# Patient Record
Sex: FEMALE | Race: WHITE | NOT HISPANIC OR LATINO | Employment: OTHER | URBAN - METROPOLITAN AREA
[De-identification: names, ages, dates, MRNs, and addresses within clinical notes are randomized per-mention and may not be internally consistent; named-entity substitution may affect disease eponyms.]

---

## 2017-01-03 ENCOUNTER — ANTICOAGULATION MONITORING (OUTPATIENT)
Dept: VASCULAR LAB | Facility: MEDICAL CENTER | Age: 57
End: 2017-01-03

## 2017-01-03 DIAGNOSIS — D68.51 FACTOR V LEIDEN (HCC): ICD-10-CM

## 2017-01-03 LAB — INR PPP: 3.4 (ref 2–3.5)

## 2017-01-03 NOTE — PROGRESS NOTES
Anticoagulation Summary as of 1/3/2017     INR goal 2.5-3.5   Selected INR 3.4 (1/3/2017)   Maintenance plan 4.5 mg (3 mg x 1.5) on Mon, Wed, Fri; 3 mg (3 mg x 1) all other days   Weekly total 25.5 mg   Plan last modified Deacon Novak, KRZYSZTOF (8/15/2016)   Next INR check 1/6/2017   Target end date Indefinite    Indications   Factor V Leiden (HCC) [D68.51]  Deep vein thrombosis [453.40] (Resolved) [I82.409]  Pulmonary embolism [415.19] (Resolved) [I26.99]         Anticoagulation Episode Summary     [facility] location [facility]    Preferred lab     Send INR reminders to     Comments PATIENT SELF MONITORING      Anticoagulation Care Providers     Provider Role Specialty Phone number    HERMINIA Diehl. Referring Family Medicine [phone]    Eliseo Eduardo M.D. Responsible Cardiology [phone]    Deacon Novak, PHARMD Responsible          PSM patient however Phyllis started Augmentin and prednisone on 12/28. She does not remember exactly when she started taking these. Continue current dose of warfarin for now. Follow up INR on Friday.    Deacon Novak, FLYD

## 2017-01-11 ENCOUNTER — ANTICOAGULATION MONITORING (OUTPATIENT)
Dept: VASCULAR LAB | Facility: MEDICAL CENTER | Age: 57
End: 2017-01-11

## 2017-01-11 DIAGNOSIS — D68.51 FACTOR V LEIDEN (HCC): ICD-10-CM

## 2017-01-11 LAB — INR PPP: 3.6 (ref 2–3.5)

## 2017-01-11 NOTE — PROGRESS NOTES
Anticoagulation Summary as of 1/11/2017     INR goal 2.5-3.5   Selected INR 3.6! (1/11/2017)   Maintenance plan 4.5 mg (3 mg x 1.5) on Mon, Wed, Fri; 3 mg (3 mg x 1) all other days   Weekly total 25.5 mg   Plan last modified Deacon Novak PHARMD (8/15/2016)   Next INR check 1/18/2017   Target end date Indefinite    Indications   Factor V Leiden (HCC) [D68.51]  Deep vein thrombosis [453.40] (Resolved) [I82.409]  Pulmonary embolism [415.19] (Resolved) [I26.99]         Anticoagulation Episode Summary     [facility]    Preferred lab     Send INR reminders to     Comments PATIENT SELF MONITORING      Anticoagulation Care Providers     Provider Role Specialty Phone number    HERMINIA Diehl. Referring Family Medicine [phone]    Eliseo Eduardo M.D. Responsible Cardiology [phone]    Deacon Novak, KRZYSZTOF Responsible          Anticoagulation Patient Findings    This patient is enrolled in the Patient Self Management Program    Deacon Novak, FLYD

## 2017-01-17 ENCOUNTER — ANTICOAGULATION MONITORING (OUTPATIENT)
Dept: VASCULAR LAB | Facility: MEDICAL CENTER | Age: 57
End: 2017-01-17

## 2017-01-17 DIAGNOSIS — D68.51 FACTOR V LEIDEN (HCC): ICD-10-CM

## 2017-01-17 LAB — INR PPP: 2.4 (ref 2–3.5)

## 2017-01-17 NOTE — PROGRESS NOTES
Anticoagulation Summary as of 1/17/2017     INR goal 2.5-3.5   Selected INR 2.4! (1/17/2017)   Maintenance plan 4.5 mg (3 mg x 1.5) on Mon, Wed, Fri; 3 mg (3 mg x 1) all other days   Weekly total 25.5 mg   Plan last modified Deacon Novak PHARMD (8/15/2016)   Next INR check 1/24/2017   Target end date Indefinite    Indications   Factor V Leiden (CMS-[facility]) [D68.51]  Deep vein thrombosis [453.40] (Resolved) [I82.409]  Pulmonary embolism [415.19] (Resolved) [I26.99]         Anticoagulation Episode Summary     [facility]    Preferred lab     Send INR reminders to     Comments PATIENT SELF MONITORING      Anticoagulation Care Providers     Provider Role Specialty Phone number    DERICK Diehl Referring Family Medicine [phone]    Eliseo Eduardo M.D. Responsible Cardiology [phone]    Deacon Novak, PHARMD Responsible          This patient is enrolled in the Patient Self Management Program    Deacon Novak, PHARMD

## 2017-01-17 NOTE — PROGRESS NOTES
Received message pt will be having a dental procedure 2-7-17 which she will need to stop her warfarin for and she is having an epidural 2- which she will need to stop her warfarin for. Pt will need bridging per previous note.     Carmencita Gandhi, Pharm D

## 2017-01-19 ENCOUNTER — ANTICOAGULATION MONITORING (OUTPATIENT)
Dept: VASCULAR LAB | Facility: MEDICAL CENTER | Age: 57
End: 2017-01-19

## 2017-01-19 DIAGNOSIS — D68.51 FACTOR V LEIDEN (HCC): ICD-10-CM

## 2017-01-19 NOTE — PROGRESS NOTES
Anticoagulation Summary as of 1/19/2017     INR goal 2.5-3.5   Selected INR    Maintenance plan 4.5 mg (3 mg x 1.5) on Mon, Wed, Fri; 3 mg (3 mg x 1) all other days   Weekly total 25.5 mg   Plan last modified Deacon Novak, PHARMD (8/15/2016)   Next INR check    Target end date Indefinite    Indications   Factor V Leiden (CMS-HCC) [D68.51]  Deep vein thrombosis [453.40] (Resolved) [I82.409]  Pulmonary embolism [415.19] (Resolved) [I26.99]         Anticoagulation Episode Summary     [facility]    Preferred lab     Send INR reminders to     Comments PATIENT SELF MONITORING      Anticoagulation Care Providers     Provider Role Specialty Phone number    HERMINIA Diehl. Referring Family Medicine [phone]    Eliseo Eduardo M.D. Responsible Cardiology [phone]    Deacon Novak, PHARMD Responsible          Anticoagulation Patient Findings      Phyllis has two procedures, a dental surgery and a epidural.    She will stop warfarin for 5 days prior to dental procedure and 7 days prior to epidural.  She will use Lovenox 100mg once daily before and after both procedures (not using any the day before or the day of procedure). The date of the epidural is not schedules yet.       This note will be faxed to the [facility] [phone], attention Dr. Demetrice Bailey, PHARMD

## 2017-01-20 DIAGNOSIS — Z95.828 S/P INSERTION OF IVC (INFERIOR VENA CAVAL) FILTER: ICD-10-CM

## 2017-01-20 DIAGNOSIS — I25.2 OLD MI (MYOCARDIAL INFARCTION): ICD-10-CM

## 2017-01-20 DIAGNOSIS — D68.51 FACTOR V LEIDEN (HCC): ICD-10-CM

## 2017-02-16 ENCOUNTER — ANTICOAGULATION MONITORING (OUTPATIENT)
Dept: VASCULAR LAB | Facility: MEDICAL CENTER | Age: 57
End: 2017-02-16

## 2017-02-16 DIAGNOSIS — D68.51 FACTOR V LEIDEN (HCC): ICD-10-CM

## 2017-02-16 LAB — INR PPP: 1.7 (ref 2–3.5)

## 2017-02-17 NOTE — PROGRESS NOTES
Anticoagulation Summary as of 2/16/2017     INR goal 2.5-3.5   Selected INR 1.7! (2/16/2017)   Maintenance plan 4.5 mg (3 mg x 1.5) on Mon, Wed, Fri; 3 mg (3 mg x 1) all other days   Weekly total 25.5 mg   Plan last modified Deacon Novak, PHARMD (8/15/2016)   Next INR check 2/20/2017   Target end date Indefinite    Indications   Factor V Leiden (CMS-[facility]) [D68.51]  Deep vein thrombosis [453.40] (Resolved) [I82.409]  Pulmonary embolism [415.19] (Resolved) [I26.99]         Anticoagulation Episode Summary     [facility]    Preferred lab     Send INR reminders to     Comments PATIENT SELF MONITORING      Anticoagulation Care Providers     Provider Role Specialty Phone number    HERMINIA Diehl. Referring Family Medicine [phone]    Eliseo Eduardo M.D. Responsible Cardiology [phone]    Deacon Novak, PHARMD Responsible          Anticoagulation Patient Findings    Spoke to patient on phone. Has been back on warfarin for > a week. Last dose of enoxaparin was a couple of days ago. Restart enoxaparin 100 mg daily. Warfarin 4.5 mg tonight, 6 mg tomorrow, then resume previously prescribed regimen listed above. Follow up INR on Monday.    Deacon Novak, PHARMD

## 2017-02-20 LAB — INR PPP: 2.8 (ref 2–3.5)

## 2017-02-22 ENCOUNTER — ANTICOAGULATION MONITORING (OUTPATIENT)
Dept: VASCULAR LAB | Facility: MEDICAL CENTER | Age: 57
End: 2017-02-22

## 2017-02-22 DIAGNOSIS — D68.51 FACTOR V LEIDEN (HCC): ICD-10-CM

## 2017-02-22 NOTE — PROGRESS NOTES
Anticoagulation Summary as of 2/22/2017     INR goal 2.5-3.5   Selected INR 2.8 (2/20/2017)   Maintenance plan 4.5 mg (3 mg x 1.5) on Mon, Wed, Fri; 3 mg (3 mg x 1) all other days   Weekly total 25.5 mg   No change documented Deacon Novak PHARMD   Plan last modified Deacon Novak PHARMD (8/15/2016)   Next INR check 2/27/2017   Target end date Indefinite    Indications   Factor V Leiden (CMS-[facility]) [D68.51]  Deep vein thrombosis [453.40] (Resolved) [I82.409]  Pulmonary embolism [415.19] (Resolved) [I26.99]         Anticoagulation Episode Summary     [facility]    Preferred lab     Send INR reminders to     Comments PATIENT SELF MONITORING      Anticoagulation Care Providers     Provider Role Specialty Phone number    DERICK Diehl Referring Family Medicine [phone]    Eliseo Eduardo M.D. Responsible Cardiology [phone]    Deacon Novak PHARMD Responsible          Anticoagulation Patient Findings    Spoke to patient on phone. She discontinued enoxaparin as a result of therapeutic INR. Continue current dose of warfarin. Follow up INR on Monday.    Deacon Novak PHARMD

## 2017-03-01 ENCOUNTER — ANTICOAGULATION MONITORING (OUTPATIENT)
Dept: VASCULAR LAB | Facility: MEDICAL CENTER | Age: 57
End: 2017-03-01

## 2017-03-01 DIAGNOSIS — D68.51 FACTOR V LEIDEN (HCC): ICD-10-CM

## 2017-03-01 LAB — INR PPP: 3.4 (ref 2–3.5)

## 2017-03-01 NOTE — PROGRESS NOTES
Anticoagulation Summary as of 3/1/2017     INR goal 2.5-3.5   Selected INR 3.4 (3/1/2017)   Maintenance plan 4.5 mg (3 mg x 1.5) on Mon, Wed, Fri; 3 mg (3 mg x 1) all other days   Weekly total 25.5 mg   No change documented Deacon Novak PHARMD   Plan last modified Deacon Novak PHARMD (8/15/2016)   Next INR check 3/15/2017   Target end date Indefinite    Indications   Factor V Leiden (CMS-HCC) [D68.51]  Deep vein thrombosis [453.40] (Resolved) [I82.409]  Pulmonary embolism [415.19] (Resolved) [I26.99]         Anticoagulation Episode Summary     [facility]    Preferred lab     Send INR reminders to     Comments PATIENT SELF MONITORING      Anticoagulation Care Providers     Provider Role Specialty Phone number    DERICK Diehl Referring Family Medicine [phone]    Eliseo Eduardo M.D. Responsible Cardiology [phone]    Deacon Novak PHARMD Responsible          This patient is enrolled in the Patient Self Management Program    Deacon Novak, KRZYSZTOF

## 2017-03-07 ENCOUNTER — TELEPHONE (OUTPATIENT)
Dept: CARDIOLOGY | Facility: MEDICAL CENTER | Age: 57
End: 2017-03-07

## 2017-03-08 NOTE — TELEPHONE ENCOUNTER
----- Message from Catrina Dobson sent at 3/7/2017  2:53 PM PST -----  Regarding: wants surgical clearance done at next appt.  JUAN/Dora        Patient has appt on 03/22, wants to know if she can get her surgical clearance done at the same time. She can be reached at [phone].

## 2017-03-20 LAB — INR PPP: 4.3 (ref 2–3.5)

## 2017-03-21 ENCOUNTER — ANTICOAGULATION MONITORING (OUTPATIENT)
Dept: VASCULAR LAB | Facility: MEDICAL CENTER | Age: 57
End: 2017-03-21

## 2017-03-21 DIAGNOSIS — D68.51 FACTOR V LEIDEN (HCC): ICD-10-CM

## 2017-03-21 NOTE — PROGRESS NOTES
Anticoagulation Summary as of 3/21/2017     INR goal 2.5-3.5   Selected INR 4.3! (3/20/2017)   Maintenance plan 4.5 mg (3 mg x 1.5) on Mon, Wed, Fri; 3 mg (3 mg x 1) all other days   Weekly total 25.5 mg   Plan last modified Deacon Novak, PHARMD (8/15/2016)   Next INR check 3/27/2017   Target end date Indefinite    Indications   Factor V Leiden (CMS-[facility]) [D68.51]  Deep vein thrombosis [453.40] (Resolved) [I82.409]  Pulmonary embolism [415.19] (Resolved) [I26.99]         Anticoagulation Episode Summary     [facility]    Preferred lab     Send INR reminders to     Comments PATIENT SELF MONITORING      Anticoagulation Care Providers     Provider Role Specialty Phone number    HERMINIA Diehl. Referring Family Medicine [phone]    Eliseo Eduardo M.D. Responsible Cardiology [phone]    Deacon Novak, PHARMD Responsible          Anticoagulation Patient Findings    Left message for patient to HOLD warfarin x 1 dose then resume previously prexscribed regimen. Follow up INR in one week.    Deacon Novak, FLYD

## 2017-03-22 ENCOUNTER — NON-PROVIDER VISIT (OUTPATIENT)
Dept: CARDIOLOGY | Facility: MEDICAL CENTER | Age: 57
End: 2017-03-22
Payer: MEDICARE

## 2017-03-22 ENCOUNTER — TELEPHONE (OUTPATIENT)
Dept: CARDIOLOGY | Facility: MEDICAL CENTER | Age: 57
End: 2017-03-22

## 2017-03-22 ENCOUNTER — OFFICE VISIT (OUTPATIENT)
Dept: CARDIOLOGY | Facility: MEDICAL CENTER | Age: 57
End: 2017-03-22
Payer: MEDICARE

## 2017-03-22 VITALS — SYSTOLIC BLOOD PRESSURE: 92 MMHG | DIASTOLIC BLOOD PRESSURE: 66 MMHG | OXYGEN SATURATION: 96 % | HEART RATE: 88 BPM

## 2017-03-22 DIAGNOSIS — R10.12 ABDOMINAL PAIN, LUQ (LEFT UPPER QUADRANT): ICD-10-CM

## 2017-03-22 DIAGNOSIS — Z01.810 PRE-OPERATIVE CARDIOVASCULAR EXAMINATION: ICD-10-CM

## 2017-03-22 DIAGNOSIS — Z95.0 CARDIAC PACEMAKER IN SITU: ICD-10-CM

## 2017-03-22 DIAGNOSIS — I25.10 CORONARY ARTERIOSCLEROSIS: ICD-10-CM

## 2017-03-22 DIAGNOSIS — M47.897 OTHER OSTEOARTHRITIS OF SPINE, LUMBOSACRAL REGION: ICD-10-CM

## 2017-03-22 DIAGNOSIS — E78.2 MIXED HYPERLIPIDEMIA: ICD-10-CM

## 2017-03-22 DIAGNOSIS — D68.51 FACTOR V LEIDEN (HCC): ICD-10-CM

## 2017-03-22 PROCEDURE — 93280 PM DEVICE PROGR EVAL DUAL: CPT | Performed by: INTERNAL MEDICINE

## 2017-03-22 PROCEDURE — G8432 DEP SCR NOT DOC, RNG: HCPCS | Performed by: NURSE PRACTITIONER

## 2017-03-22 PROCEDURE — 3014F SCREEN MAMMO DOC REV: CPT | Mod: 8P | Performed by: NURSE PRACTITIONER

## 2017-03-22 PROCEDURE — 1036F TOBACCO NON-USER: CPT | Performed by: NURSE PRACTITIONER

## 2017-03-22 PROCEDURE — 99214 OFFICE O/P EST MOD 30 MIN: CPT | Performed by: NURSE PRACTITIONER

## 2017-03-22 PROCEDURE — G8420 CALC BMI NORM PARAMETERS: HCPCS | Performed by: NURSE PRACTITIONER

## 2017-03-22 PROCEDURE — G8598 ASA/ANTIPLAT THER USED: HCPCS | Performed by: NURSE PRACTITIONER

## 2017-03-22 PROCEDURE — 93000 ELECTROCARDIOGRAM COMPLETE: CPT | Performed by: NURSE PRACTITIONER

## 2017-03-22 PROCEDURE — G8484 FLU IMMUNIZE NO ADMIN: HCPCS | Performed by: NURSE PRACTITIONER

## 2017-03-22 RX ORDER — POTASSIUM CHLORIDE 750 MG/1
1 TABLET, FILM COATED, EXTENDED RELEASE ORAL DAILY
Refills: 5 | COMMUNITY
Start: 2017-02-22

## 2017-03-22 RX ORDER — ROPINIROLE 4 MG/1
4 TABLET, FILM COATED ORAL NIGHTLY
COMMUNITY

## 2017-03-22 RX ORDER — SIMVASTATIN 40 MG
40 TABLET ORAL EVERY EVENING
Qty: 30 TAB | Refills: 11 | Status: SHIPPED | OUTPATIENT
Start: 2017-03-22 | End: 2017-06-29

## 2017-03-22 ASSESSMENT — ENCOUNTER SYMPTOMS
NECK PAIN: 1
MYALGIAS: 0
WEAKNESS: 1
COUGH: 0
PND: 0
SHORTNESS OF BREATH: 1
DIZZINESS: 1
BACK PAIN: 1
ABDOMINAL PAIN: 1
PALPITATIONS: 1
CLAUDICATION: 0
ORTHOPNEA: 0

## 2017-03-22 NOTE — TELEPHONE ENCOUNTER
----- Message from DERICK Holden sent at 3/22/2017  1:13 PM PDT -----  Regarding: surgical clearance  Dora, we need a surgical clearance for this patient with moderate risk. Please have me and Dr. Butts will sign.    Also we need a note sent to her primary care regarding her tan stools and previous abdominal pain.

## 2017-03-22 NOTE — Clinical Note
[facility]   [address]  [place] [address]  Phone: [phone]  Fax: [phone]              Phyllis Perez  1960    Encounter Date: 3/22/2017    DERICK Holden          PROGRESS NOTE:  Subjective:   Phyllis Perez is a 56 y.o. female who presents today for preoperative clearance. She is planning on undergoing a lumbar fusion by Dr. Rayo. No date has been set for the surgery as she needs cardiac clearance.    She has a history of having heart attacks in the past but never received any stenting. She has no exertional chest discomfort. One week ago she did awaken with pain over her left lower ribs and left upper abdomen. This lasted about 20 minutes before it went away. She has had some other stabbing pains in her abdomen also. She tells me that when this was occurring her stools were a light tan color. She denied any continuous abdominal pain. Her pain and stools have returned to normal.    She has exertional shortness of breath. Chest multiple orthopedic complaints and is on chronic pain medication.    Past Medical History   Diagnosis Date   • CAD (coronary artery disease)      Old MI/POBA   • Emphysema (subcutaneous) (surgical) resulting from a procedure    • Factor V Leiden (CMS-HCC)    • History of blood clots      DVT with PE   • Anticoagulant prescribed    • Hyperlipemia, mixed    • Uterine cancer (CMS-HCC)    • Ovarian cancer (CMS-HCC)    • Hot flashes    • Pacemaker    • Asthma    • Old MI (myocardial infarction)      Treated with POBA; Cath without obstructive DZ 2009   • Anemia    • Chronic hypoxemic respiratory failure (CMS-HCC)    • SSS (sick sinus syndrome) (CMS-HCC)      Tx with pacer   • Sleep apnea      CPAP     Past Surgical History   Procedure Laterality Date   • Nasal reconstruction     • Angioplasty       ?2004   • Shoulder arthroplasty total reversed     • Elbow exploration       cts   • Cholecystectomy     • Lumbar  exploration       buck   • Hysterectomy laparoscopy     • Oophorectomy     • Knee replacement, total     • Knee revision total     • Other surgical procedure       IVC filter placement   • Pacemaker insertion  2010     bradycardia     Family History   Problem Relation Age of Onset   • Cancer Mother    • Cancer Father    • Cancer Maternal Aunt    • Cancer Maternal Uncle      History   Smoking status   • Former Smoker -- 15 years   • Quit date: 07/10/2001   Smokeless tobacco   • Never Used     Allergies   Allergen Reactions   • Bactrim [Sulfamethoxazole W-Trimethoprim]    • Morphine    • Other Misc Hives     cloraprep   • Tape      Outpatient Encounter Prescriptions as of 3/22/2017   Medication Sig Dispense Refill   • potassium chloride ER (KLOR-CON) 10 MEQ tablet Take 1 Tab by mouth every day.  5   • ropinirole (REQUIP) 4 MG tablet Take 4 mg by mouth every evening.     • simvastatin (ZOCOR) 40 MG Tab Take 1 Tab by mouth every evening. 30 Tab 11   • enoxaparin (LOVENOX) 100 MG/ML Solution inj Inject 100 mg as instructed every day. (Patient taking differently: Inject 100 mg as instructed every day. PRIOR TO ANY SURGERIES) 10 Syringe 1   • warfarin (COUMADIN) 3 MG Tab TAKE 1 TO 1 AND 1/2 TABLETS BY MOUTH DAILY AS DIRECTED BY [facility] 135 Tab 1   • albuterol (PROVENTIL) 2.5mg/3ml Nebu Soln solution for nebulization 3 mL by Nebulization route every four hours as needed for Shortness of Breath. 75 mL 0   • ipratropium (ATROVENT) 0.02 % Solution 1 mL by Nebulization route 2 times a day. 60 Vial 0   • temazepam (RESTORIL) 30 MG capsule Take 1 Cap by mouth at bedtime as needed for Sleep. Fill at monthly intervals or greater. 30 Cap 0   • fentanyl (DURAGESIC) 100 MCG/HR PATCH 72 HR Apply 1 Patch to skin as directed every 72 hours.  0   • ADVAIR DISKUS 250-50 MCG/DOSE AEROSOL POWDER, BREATH ACTIVATED Inhale 1 Puff by mouth every day.  11   • nitroglycerin (NITROLINGUAL) 0.4 MG/SPRAY Solution Place 1 Spray under tongue as  "needed. For chest pain  0   • hydrocodone/acetaminophen (NORCO)  MG Tab Take 1 tablet by mouth every 4 hours as needed for breakthrough pain. The earliest date the pharmacy may fill the rx is 7/18/2016. 180 Tab 0   • carisoprodol (SOMA) 350 MG Tab Take 1 Tab by mouth every 8 hours as needed for Muscle Spasms. Fill at monthly intervals or greater. 90 Tab 0   • albuterol (VENTOLIN OR PROVENTIL) 108 (90 BASE) MCG/ACT AERS inhalation aerosol Inhale 2 Puffs by mouth every 6 hours as needed for Shortness of Breath. 8.5 g 3   • docusate sodium (COLACE) 100 MG CAPS Take 1 Cap by mouth 2 times a day. 60 Cap 5   • fluticasone (FLONASE) 50 MCG/ACT nasal spray Spray 1 Spray in nose every day. 16 g 11   • ipratropium (ATROVENT) 0.03 % SOLN Spray 2 Sprays in nose every 12 hours. 1 Bottle 5   • Loratadine (CLARITIN) 10 MG CAPS Take 1 Cap by mouth every day. 30 Cap 11   • ferrous sulfate 325 (65 FE) MG tablet Take 1 Tab by mouth every day. 30 Tab 3   • aspirin (ASA) 81 MG Chew Tab chewable tablet Take 81 mg by mouth every day.     • [DISCONTINUED] ropinirole (REQUIP) 2 MG tablet Take 1 Tab by mouth every bedtime. (Patient not taking: Reported on 3/22/2017) 30 Tab 11   • [DISCONTINUED] simvastatin (ZOCOR) 40 MG TABS Take 1 Tab by mouth every evening. 30 Tab 11     No facility-administered encounter medications on file as of 3/22/2017.     Review of Systems   Constitutional: Positive for malaise/fatigue.   Respiratory: Positive for shortness of breath (exertion). Negative for cough.    Cardiovascular: Positive for palpitations (occasional. Nonsustained.). Negative for chest pain, orthopnea, claudication, leg swelling and PND.   Gastrointestinal: Positive for abdominal pain (Left upper abdominal pain last week.).        One week ago, tan stools, now normal.   Musculoskeletal: Positive for back pain (chronic), joint pain (knees, arm) and neck pain (chronic). Negative for myalgias.   Neurological: Positive for dizziness (\"All the " "time\" ) and weakness.        Objective:   BP 92/66 mmHg  Pulse 88  SpO2 96%    Physical Exam   Constitutional: She is oriented to person, place, and time. She appears well-developed and well-nourished.   Thin female, uses walker.   HENT:   Head: Normocephalic.   Eyes: Conjunctivae are normal.   Neck: No JVD present. No thyromegaly present.   Cardiovascular: Normal rate, regular rhythm, S1 normal, S2 normal and normal heart sounds.  Exam reveals no gallop, no S3, no S4 and no friction rub.    No murmur heard.  Pulmonary/Chest: Effort normal and breath sounds normal. No respiratory distress. She has no wheezes. She has no rales.   Pacemaker left upper chest without redness or fluctuance.   Abdominal: Soft. Bowel sounds are normal. She exhibits no distension and no mass. There is tenderness (left upper quadrant to deep palpation.). There is no rebound and no guarding.   Musculoskeletal: She exhibits no edema.   Neurological: She is alert and oriented to person, place, and time.   Skin: Skin is warm and dry.   Psychiatric: She has a normal mood and affect.     September 30, 2016: Comprehensive metabolic panel is within normal limits with exception of potassium 3.4. Lipids: Cholesterol 155, triglycerides 99, HDL 76, LDL 59. TSH 1.82.    Today: EKG is atrial paced rhythm without ectopy.    Assessment:     1. Pre-operative cardiovascular examination     2. Coronary arteriosclerosis  [facility] EKG (Clinic Performed)    simvastatin (ZOCOR) 40 MG Tab   3. Abdominal pain, LUQ (left upper quadrant)     4. Mixed hyperlipidemia     5. Factor V Leiden (CMS-HCC)     6. Other osteoarthritis of spine, lumbosacral region         Medical Decision Making:  Today's Assessment / Status / Plan:     Preoperative cardiovascular exam: I consulted with Dr. Butts regarding the patient. We feel that she is stable from a cardiovascular standpoint. She may undergo her surgery with moderate risk but needs to have her abdominal discomfort and " light-colored stools evaluated. She is agreeable.    Coronary atherosclerosis: Her EKG shows a atrially paced rhythm. She has no exertional chest discomfort symptoms.    Abdominal pain: She is tender to palpation in the left upper quadrant. It is concerning that she had tan colored stools. They have returned to a normal color. She will follow-up with her primary care regarding this abdominal pain and light stools.    Hyperlipidemia:  lipids are to goal.    Factor V Leiden: She is on chronic warfarin therapy. She will need to be bridged for her surgery. She is familiar with bridging and will contact the anticoagulation clinic.    Lumbar back pain: She is planning to undergo a lumbar laminectomy with Dr. Rayo.      She will follow-up in 3 months with Dr. Butts, willow stovall.    Collaborating Provider: Dr. Butts.        No Recipients

## 2017-03-22 NOTE — MR AVS SNAPSHOT
Phyllis Perez   3/22/2017 11:00 AM   Office Visit   MRN: [id number]    Department:  [facility]   Dept Phone:  [phone]    Description:  Female : 1960   Provider:  DERICK Holden           Reason for Visit     Follow-Up Needs surgicial clearance for depending date, neck surgery with Dr. Gilliland at [facility].      Allergies as of 3/22/2017     Allergen Noted Reactions    Bactrim [Sulfamethoxazole W-Trimethoprim] 2015       Morphine 2015       Other Misc 2015   Hives    cloraprep    Tape 2015         You were diagnosed with     Pre-operative cardiovascular examination   [V72.81.ICD-9-CM]       Coronary arteriosclerosis   [066599]       Abdominal pain, LUQ (left upper quadrant)   [893175]       Mixed hyperlipidemia   [272.2.ICD-9-CM]       Factor V Leiden (CMS-HCC)   [102437]       Other osteoarthritis of spine, lumbosacral region   [2886828]         Vital Signs     Blood Pressure Pulse Oxygen Saturation Smoking Status          92/66 mmHg 88 96% Former Smoker        Basic Information     Date Of Birth Sex Race Ethnicity Preferred Language    1960 Female White Non- English      Your appointments     2017 11:00 AM   SURGERY CLEARANCE with Uri Butts M.D.   [facility] (--)    [address]  [facility] [address] [phone]              Problem List              ICD-10-CM Priority Class Noted - Resolved    Environmental allergies Z91.09   7/10/2015 - Present    Chronic pain G89.29   7/10/2015 - Present    Insomnia G47.00   7/10/2015 - Present    Slow transit constipation K59.01   7/10/2015 - Present    COPD (chronic obstructive pulmonary disease) (CMS-HCC) J44.9   7/10/2015 - Present    Mixed hyperlipidemia E78.2   7/10/2015 - Present    Migraine without status migrainosus, not intractable G43.909   7/10/2015 - Present    Factor V Leiden (CMS-HCC) D68.51   7/10/2015 - Present    Healthcare  maintenance Z00.00   7/13/2015 - Present    Cervical spondylosis M47.812   7/14/2015 - Present    DDD (degenerative disc disease), cervical M50.30   7/14/2015 - Present    Failed back syndrome, lumbar M96.1   7/14/2015 - Present    Lumbosacral spondylosis M47.817   7/14/2015 - Present    Left lumbar radiculopathy M54.16   7/14/2015 - Present    Osteoarthrosis, localized, primary, knee M17.10   7/14/2015 - Present    Localized osteoarthrosis, shoulder region M19.019   7/14/2015 - Present    Restless leg syndrome G25.81   8/4/2015 - Present    Coronary arteriosclerosis I25.10   8/24/2015 - Present    Old MI (myocardial infarction) I25.2   8/24/2015 - Present    Chronic anticoagulation Z79.01   8/24/2015 - Present    S/P insertion of IVC (inferior vena caval) filter Z95.828   8/24/2015 - Present    Syncope R55   10/27/2015 - Present    Pain medication agreement discussed Z79.899   6/9/2016 - Present      Health Maintenance        Date Due Completion Dates    IMM PNEUMOCOCCAL 19-64 (ADULT) MEDIUM RISK SERIES (1 of 1 - PPSV23) 8/3/1979 ---    MAMMOGRAM 8/3/2016 8/3/2015 (Postponed)    Override on 8/3/2015: Postponed    IMM INFLUENZA (1) 9/1/2016 ---    PAP SMEAR 8/3/2018 8/3/2015 (Postponed)    Override on 8/3/2015: Postponed    COLONOSCOPY 8/3/2025 8/3/2015 (Postponed)    Override on 8/3/2015: Postponed    IMM DTaP/Tdap/Td Vaccine (2 - Td) 12/27/2026 12/27/2016            Results       Current Immunizations     Tdap Vaccine 12/27/2016      Below and/or attached are the medications your provider expects you to take. Review all of your home medications and newly ordered medications with your provider and/or pharmacist. Follow medication instructions as directed by your provider and/or pharmacist. Please keep your medication list with you and share with your provider. Update the information when medications are discontinued, doses are changed, or new medications (including over-the-counter products) are added; and carry  medication information at all times in the event of emergency situations     Allergies:  BACTRIM - (reactions not documented)     MORPHINE - (reactions not documented)     OTHER MISC - Hives     TAPE - (reactions not documented)               Medications  Valid as of: March 22, 2017 - 12:20 PM    Generic Name Brand Name Tablet Size Instructions for use    Albuterol Sulfate (Aero Soln) albuterol 108 (90 BASE) MCG/ACT Inhale 2 Puffs by mouth every 6 hours as needed for Shortness of Breath.        Albuterol Sulfate (Nebu Soln) PROVENTIL 2.5mg/3ml 3 mL by Nebulization route every four hours as needed for Shortness of Breath.        Aspirin (Chew Tab) ASA 81 MG Take 81 mg by mouth every day.        Carisoprodol (Tab) SOMA 350 MG Take 1 Tab by mouth every 8 hours as needed for Muscle Spasms. Fill at monthly intervals or greater.        Docusate Sodium (Cap) COLACE 100 MG Take 1 Cap by mouth 2 times a day.        Enoxaparin Sodium (Solution) LOVENOX 100 MG/ML Inject 100 mg as instructed every day.        FentaNYL (PATCH 72 HR) DURAGESIC 100 MCG/HR Apply 1 Patch to skin as directed every 72 hours.        Ferrous Sulfate (Tab) ferrous sulfate 325 (65 FE) MG Take 1 Tab by mouth every day.        Fluticasone Propionate (Suspension) FLONASE 50 MCG/ACT Spray 1 Spray in nose every day.        Fluticasone-Salmeterol (AEROSOL POWDER, BREATH ACTIVATED) ADVAIR DISKUS 250-50 MCG/DOSE Inhale 1 Puff by mouth every day.        Hydrocodone-Acetaminophen (Tab) NORCO  MG Take 1 tablet by mouth every 4 hours as needed for breakthrough pain. The earliest date the pharmacy may fill the rx is 7/18/2016.        Ipratropium Bromide (Solution) ATROVENT 0.03 % Spray 2 Sprays in nose every 12 hours.        Ipratropium Bromide (Solution) ATROVENT 0.02 % 1 mL by Nebulization route 2 times a day.        Loratadine (Cap) Loratadine 10 MG Take 1 Cap by mouth every day.        Nitroglycerin (Solution) NITROLINGUAL 0.4 MG/SPRAY Place 1 Spray under  tongue as needed. For chest pain        Potassium Chloride (Tab CR) KLOR-CON 10 MEQ Take 1 Tab by mouth every day.        ROPINIRole HCl (Tab) REQUIP 4 MG Take 4 mg by mouth every evening.        Simvastatin (Tab) ZOCOR 40 MG Take 1 Tab by mouth every evening.        Temazepam (Cap) RESTORIL 30 MG Take 1 Cap by mouth at bedtime as needed for Sleep. Fill at monthly intervals or greater.        Warfarin Sodium (Tab) COUMADIN 3 MG TAKE 1 TO 1 AND 1/2 TABLETS BY MOUTH DAILY AS DIRECTED BY COUMADIN CLINIC        .                 Medicines prescribed today were sent to:     [id number] - [place], [place] - [address]    [address] [place] [address]    Phone: [phone] Fax: [phone]    Open 24 Hours?: No      Medication refill instructions:       If your prescription bottle indicates you have medication refills left, it is not necessary to call your provider’s office. Please contact your pharmacy and they will refill your medication.    If your prescription bottle indicates you do not have any refills left, you may request refills at any time through one of the following ways: The online Pulse 8 system (except Urgent Care), by calling your provider’s office, or by asking your pharmacy to contact your provider’s office with a refill request. Medication refills are processed only during regular business hours and may not be available until the next business day. Your provider may request additional information or to have a follow-up visit with you prior to refilling your medication.   *Please Note: Medication refills are assigned a new Rx number when refilled electronically. Your pharmacy may indicate that no refills were authorized even though a new prescription for the same medication is available at the pharmacy. Please request the medicine by name with the pharmacy before contacting your provider for a refill.        Other Notes About Your Plan      1. Anticoagulation clinic  2. Cardiology  3. Pain clinic  4. Ortho           [employer] Access Code: [email or web address]  Expires: 4/21/2017  9:52 AM    Giphy  A secure, online tool to manage your health information     [employer]’s Giphy® is a secure, online tool that connects you to your personalized health information from the privacy of your home -- day or night - making it very easy for you to manage your healthcare. Once the activation process is completed, you can even access your medical information using the Giphy gilmar, which is available for free in the Apple Gilmar store or Google Play store.     Giphy provides the following levels of access (as shown below):   My Chart Features   [facility] Doctor [facility]  Specialists [facility]  Urgent  Care Non-[facility]  Primary Care  Doctor   Email your healthcare team securely and privately 24/7 X X X    Manage appointments: schedule your next appointment; view details of past/upcoming appointments X      Request prescription refills. X      View recent personal medical records, including lab and immunizations X X X X   View health record, including health history, allergies, medications X X X X   Read reports about your outpatient visits, procedures, consult and ER notes X X X X   See your discharge summary, which is a recap of your hospital and/or ER visit that includes your diagnosis, lab results, and care plan. X X       How to register for Giphy:  1. Go to  [email or web address].  2. Click on the Sign Up Now box, which takes you to the New Member Sign Up page. You will need to provide the following information:  a. Enter your Giphy Access Code exactly as it appears at the top of this page. (You will not need to use this code after you’ve completed the sign-up process. If you do not sign up before the expiration date, you must request a new code.)   b. Enter your date of birth.   c. Enter your home email address.   d. Click Submit, and follow the  next screen’s instructions.  3. Create a AllTrailst ID. This will be your AllTrailst login ID and cannot be changed, so think of one that is secure and easy to remember.  4. Create a AllTrailst password. You can change your password at any time.  5. Enter your Password Reset Question and Answer. This can be used at a later time if you forget your password.   6. Enter your e-mail address. This allows you to receive e-mail notifications when new information is available in [employer].  7. Click Sign Up. You can now view your health information.    For assistance activating your [employer] account, call [phone]

## 2017-03-22 NOTE — TELEPHONE ENCOUNTER
Clearance letter completed and faxed to Dr. Rayo @ [phone].  Chart note and letter requesting that patient be seen and evaluated for tan colored stools and abd pain faxed to patient's PCP.

## 2017-03-22 NOTE — TELEPHONE ENCOUNTER
----- Message from Deacon Novak PHARMD sent at 3/22/2017  2:06 PM PDT -----  I spoke to her. She will call me when she has a procedure date to coordinate bridging.    Thanks,    Deacon  ----- Message -----     From: Candelaria Melvin R.N.     Sent: 3/22/2017   2:00 PM       To: KRZYSZTOF Irving.  This patient is on chronic Warfarin therapy for Factor V Leiden and is having a lumbar laminectomy and will require bridging.  She is familiar with bridging.  Thanks so much!

## 2017-03-22 NOTE — PROGRESS NOTES
Subjective:   Phyllis Perez is a 56 y.o. female who presents today for preoperative clearance. She is planning on undergoing a lumbar fusion by Dr. Rayo. No date has been set for the surgery as she needs cardiac clearance.    She has a history of having heart attacks in the past but never received any stenting. She has no exertional chest discomfort. One week ago she did awaken with pain over her left lower ribs and left upper abdomen. This lasted about 20 minutes before it went away. She has had some other stabbing pains in her abdomen also. She tells me that when this was occurring her stools were a light tan color. She denied any continuous abdominal pain. Her pain and stools have returned to normal.    She has exertional shortness of breath. Chest multiple orthopedic complaints and is on chronic pain medication.    Past Medical History   Diagnosis Date   • CAD (coronary artery disease)      Old MI/POBA   • Emphysema (subcutaneous) (surgical) resulting from a procedure    • Factor V Leiden (CMS-HCC)    • History of blood clots      DVT with PE   • Anticoagulant prescribed    • Hyperlipemia, mixed    • Uterine cancer (CMS-HCC)    • Ovarian cancer (CMS-HCC)    • Hot flashes    • Pacemaker    • Asthma    • Old MI (myocardial infarction)      Treated with POBA; Cath without obstructive DZ 2009   • Anemia    • Chronic hypoxemic respiratory failure (CMS-HCC)    • SSS (sick sinus syndrome) (CMS-HCC)      Tx with pacer   • Sleep apnea      CPAP     Past Surgical History   Procedure Laterality Date   • Nasal reconstruction     • Angioplasty       ?2004   • Shoulder arthroplasty total reversed     • Elbow exploration       cts   • Cholecystectomy     • Lumbar exploration       buck   • Hysterectomy laparoscopy     • Oophorectomy     • Knee replacement, total     • Knee revision total     • Other surgical procedure       IVC filter placement   • Pacemaker insertion  2010     bradycardia     Family History   Problem  Relation Age of Onset   • Cancer Mother    • Cancer Father    • Cancer Maternal Aunt    • Cancer Maternal Uncle      History   Smoking status   • Former Smoker -- 15 years   • Quit date: 07/10/2001   Smokeless tobacco   • Never Used     Allergies   Allergen Reactions   • Bactrim [Sulfamethoxazole W-Trimethoprim]    • Morphine    • Other Misc Hives     cloraprep   • Tape      Outpatient Encounter Prescriptions as of 3/22/2017   Medication Sig Dispense Refill   • potassium chloride ER (KLOR-CON) 10 MEQ tablet Take 1 Tab by mouth every day.  5   • ropinirole (REQUIP) 4 MG tablet Take 4 mg by mouth every evening.     • simvastatin (ZOCOR) 40 MG Tab Take 1 Tab by mouth every evening. 30 Tab 11   • enoxaparin (LOVENOX) 100 MG/ML Solution inj Inject 100 mg as instructed every day. (Patient taking differently: Inject 100 mg as instructed every day. PRIOR TO ANY SURGERIES) 10 Syringe 1   • warfarin (COUMADIN) 3 MG Tab TAKE 1 TO 1 AND 1/2 TABLETS BY MOUTH DAILY AS DIRECTED BY COUMADIN CLINIC 135 Tab 1   • albuterol (PROVENTIL) 2.5mg/3ml Nebu Soln solution for nebulization 3 mL by Nebulization route every four hours as needed for Shortness of Breath. 75 mL 0   • ipratropium (ATROVENT) 0.02 % Solution 1 mL by Nebulization route 2 times a day. 60 Vial 0   • temazepam (RESTORIL) 30 MG capsule Take 1 Cap by mouth at bedtime as needed for Sleep. Fill at monthly intervals or greater. 30 Cap 0   • fentanyl (DURAGESIC) 100 MCG/HR PATCH 72 HR Apply 1 Patch to skin as directed every 72 hours.  0   • ADVAIR DISKUS 250-50 MCG/DOSE AEROSOL POWDER, BREATH ACTIVATED Inhale 1 Puff by mouth every day.  11   • nitroglycerin (NITROLINGUAL) 0.4 MG/SPRAY Solution Place 1 Spray under tongue as needed. For chest pain  0   • hydrocodone/acetaminophen (NORCO)  MG Tab Take 1 tablet by mouth every 4 hours as needed for breakthrough pain. The earliest date the pharmacy may fill the rx is 7/18/2016. 180 Tab 0   • carisoprodol (SOMA) 350 MG Tab Take  "1 Tab by mouth every 8 hours as needed for Muscle Spasms. Fill at monthly intervals or greater. 90 Tab 0   • albuterol (VENTOLIN OR PROVENTIL) 108 (90 BASE) MCG/ACT AERS inhalation aerosol Inhale 2 Puffs by mouth every 6 hours as needed for Shortness of Breath. 8.5 g 3   • docusate sodium (COLACE) 100 MG CAPS Take 1 Cap by mouth 2 times a day. 60 Cap 5   • fluticasone (FLONASE) 50 MCG/ACT nasal spray Spray 1 Spray in nose every day. 16 g 11   • ipratropium (ATROVENT) 0.03 % SOLN Spray 2 Sprays in nose every 12 hours. 1 Bottle 5   • Loratadine (CLARITIN) 10 MG CAPS Take 1 Cap by mouth every day. 30 Cap 11   • ferrous sulfate 325 (65 FE) MG tablet Take 1 Tab by mouth every day. 30 Tab 3   • aspirin (ASA) 81 MG Chew Tab chewable tablet Take 81 mg by mouth every day.     • [DISCONTINUED] ropinirole (REQUIP) 2 MG tablet Take 1 Tab by mouth every bedtime. (Patient not taking: Reported on 3/22/2017) 30 Tab 11   • [DISCONTINUED] simvastatin (ZOCOR) 40 MG TABS Take 1 Tab by mouth every evening. 30 Tab 11     No facility-administered encounter medications on file as of 3/22/2017.     Review of Systems   Constitutional: Positive for malaise/fatigue.   Respiratory: Positive for shortness of breath (exertion). Negative for cough.    Cardiovascular: Positive for palpitations (occasional. Nonsustained.). Negative for chest pain, orthopnea, claudication, leg swelling and PND.   Gastrointestinal: Positive for abdominal pain (Left upper abdominal pain last week.).        One week ago, tan stools, now normal.   Musculoskeletal: Positive for back pain (chronic), joint pain (knees, arm) and neck pain (chronic). Negative for myalgias.   Neurological: Positive for dizziness (\"All the time\" ) and weakness.        Objective:   BP 92/66 mmHg  Pulse 88  SpO2 96%    Physical Exam   Constitutional: She is oriented to person, place, and time. She appears well-developed and well-nourished.   Thin female, uses walker.   HENT:   Head: " Normocephalic.   Eyes: Conjunctivae are normal.   Neck: No JVD present. No thyromegaly present.   Cardiovascular: Normal rate, regular rhythm, S1 normal, S2 normal and normal heart sounds.  Exam reveals no gallop, no S3, no S4 and no friction rub.    No murmur heard.  Pulmonary/Chest: Effort normal and breath sounds normal. No respiratory distress. She has no wheezes. She has no rales.   Pacemaker left upper chest without redness or fluctuance.   Abdominal: Soft. Bowel sounds are normal. She exhibits no distension and no mass. There is tenderness (left upper quadrant to deep palpation.). There is no rebound and no guarding.   Musculoskeletal: She exhibits no edema.   Neurological: She is alert and oriented to person, place, and time.   Skin: Skin is warm and dry.   Psychiatric: She has a normal mood and affect.     September 30, 2016: Comprehensive metabolic panel is within normal limits with exception of potassium 3.4. Lipids: Cholesterol 155, triglycerides 99, HDL 76, LDL 59. TSH 1.82.    Today: EKG is atrial paced rhythm without ectopy.    Assessment:     1. Pre-operative cardiovascular examination     2. Coronary arteriosclerosis  [facility] EKG (Clinic Performed)    simvastatin (ZOCOR) 40 MG Tab   3. Abdominal pain, LUQ (left upper quadrant)     4. Mixed hyperlipidemia     5. Factor V Leiden (CMS-HCC)     6. Other osteoarthritis of spine, lumbosacral region         Medical Decision Making:  Today's Assessment / Status / Plan:     Preoperative cardiovascular exam: I consulted with Dr. Butts regarding the patient. We feel that she is stable from a cardiovascular standpoint. She may undergo her surgery with moderate risk but needs to have her abdominal discomfort and light-colored stools evaluated. She is agreeable.    Coronary atherosclerosis: Her EKG shows a atrially paced rhythm. She has no exertional chest discomfort symptoms.    Abdominal pain: She is tender to palpation in the left upper quadrant. It is  concerning that she had tan colored stools. They have returned to a normal color. She will follow-up with her primary care regarding this abdominal pain and light stools.    Hyperlipidemia:  lipids are to goal.    Factor V Leiden: She is on chronic warfarin therapy. She will need to be bridged for her surgery. She is familiar with bridging and will contact the anticoagulation clinic.    Lumbar back pain: She is planning to undergo a lumbar laminectomy with Dr. Rayo.      She will follow-up in 3 months with Dr. Butts, willow if sia.    Collaborating Provider: Dr. Butts.

## 2017-03-23 ENCOUNTER — TELEPHONE (OUTPATIENT)
Dept: CARDIOLOGY | Facility: MEDICAL CENTER | Age: 57
End: 2017-03-23

## 2017-03-24 LAB — EKG IMPRESSION: NORMAL

## 2017-03-28 ENCOUNTER — ANTICOAGULATION MONITORING (OUTPATIENT)
Dept: VASCULAR LAB | Facility: MEDICAL CENTER | Age: 57
End: 2017-03-28

## 2017-03-28 ENCOUNTER — HOSPITAL ENCOUNTER (OUTPATIENT)
Dept: LAB | Facility: MEDICAL CENTER | Age: 57
End: 2017-03-28
Attending: ORTHOPAEDIC SURGERY
Payer: MEDICARE

## 2017-03-28 DIAGNOSIS — D68.51 FACTOR V LEIDEN (HCC): ICD-10-CM

## 2017-03-28 LAB — INR PPP: 2.5 (ref 2–3.5)

## 2017-03-28 NOTE — PROGRESS NOTES
Anticoagulation Summary as of 3/28/2017     INR goal 2.5-3.5   Selected INR 2.5 (3/28/2017)   Maintenance plan 4.5 mg (3 mg x 1.5) on Mon, Wed, Fri; 3 mg (3 mg x 1) all other days   Weekly total 25.5 mg   No change documented Deacon Novak PHARMD   Plan last modified Deacon Novak PHARMD (8/15/2016)   Next INR check 4/4/2017   Target end date Indefinite    Indications   Factor V Leiden (CMS-HCC) [D68.51]  Deep vein thrombosis [453.40] (Resolved) [I82.409]  Pulmonary embolism [415.19] (Resolved) [I26.99]         Anticoagulation Episode Summary     [facility] check location [facility]    Preferred lab     Send INR reminders to     Comments PATIENT SELF MONITORING      Anticoagulation Care Providers     Provider Role Specialty Phone number    DERICK Diehl Referring Family Medicine [phone]    Eliseo Eduardo M.D. Responsible Cardiology [phone]    Deacon Novak PHARMD Responsible          This patient is enrolled in the Patient Self Management Program    Deacon Novak, [email or web address]

## 2017-04-03 ENCOUNTER — HOSPITAL ENCOUNTER (OUTPATIENT)
Dept: LAB | Facility: MEDICAL CENTER | Age: 57
End: 2017-04-03
Attending: ORTHOPAEDIC SURGERY
Payer: MEDICARE

## 2017-04-03 LAB
ALBUMIN SERPL BCP-MCNC: 4.1 G/DL (ref 3.2–4.9)
ALBUMIN/GLOB SERPL: 1.4 G/DL
ALP SERPL-CCNC: 71 U/L (ref 30–99)
ALT SERPL-CCNC: 11 U/L (ref 2–50)
ANION GAP SERPL CALC-SCNC: 6 MMOL/L (ref 0–11.9)
APPEARANCE UR: ABNORMAL
AST SERPL-CCNC: 18 U/L (ref 12–45)
BACTERIA #/AREA URNS HPF: ABNORMAL /HPF
BASOPHILS # BLD AUTO: 0.6 % (ref 0–1.8)
BASOPHILS # BLD: 0.04 K/UL (ref 0–0.12)
BILIRUB SERPL-MCNC: 0.4 MG/DL (ref 0.1–1.5)
BILIRUB UR QL STRIP.AUTO: NEGATIVE
BUN SERPL-MCNC: 12 MG/DL (ref 8–22)
CALCIUM SERPL-MCNC: 9.7 MG/DL (ref 8.5–10.5)
CAOX CRY #/AREA URNS HPF: ABNORMAL /HPF
CHLORIDE SERPL-SCNC: 107 MMOL/L (ref 96–112)
CO2 SERPL-SCNC: 28 MMOL/L (ref 20–33)
COLOR UR: YELLOW
CREAT SERPL-MCNC: 0.83 MG/DL (ref 0.5–1.4)
CULTURE IF INDICATED INDCX: NO UA CULTURE
EOSINOPHIL # BLD AUTO: 0.18 K/UL (ref 0–0.51)
EOSINOPHIL NFR BLD: 2.8 % (ref 0–6.9)
EPI CELLS #/AREA URNS HPF: ABNORMAL /HPF
ERYTHROCYTE [DISTWIDTH] IN BLOOD BY AUTOMATED COUNT: 45.1 FL (ref 35.9–50)
GFR SERPL CREATININE-BSD FRML MDRD: >60 ML/MIN/1.73 M 2
GLOBULIN SER CALC-MCNC: 3 G/DL (ref 1.9–3.5)
GLUCOSE SERPL-MCNC: 100 MG/DL (ref 65–99)
GLUCOSE UR STRIP.AUTO-MCNC: NEGATIVE MG/DL
HCT VFR BLD AUTO: 45.1 % (ref 37–47)
HGB BLD-MCNC: 14.2 G/DL (ref 12–16)
HYALINE CASTS #/AREA URNS LPF: ABNORMAL /LPF
IMM GRANULOCYTES # BLD AUTO: 0.01 K/UL (ref 0–0.11)
IMM GRANULOCYTES NFR BLD AUTO: 0.2 % (ref 0–0.9)
KETONES UR STRIP.AUTO-MCNC: NEGATIVE MG/DL
LEUKOCYTE ESTERASE UR QL STRIP.AUTO: NEGATIVE
LYMPHOCYTES # BLD AUTO: 2.48 K/UL (ref 1–4.8)
LYMPHOCYTES NFR BLD: 38.2 % (ref 22–41)
MCH RBC QN AUTO: 29.6 PG (ref 27–33)
MCHC RBC AUTO-ENTMCNC: 31.5 G/DL (ref 33.6–35)
MCV RBC AUTO: 94 FL (ref 81.4–97.8)
MICRO URNS: ABNORMAL
MONOCYTES # BLD AUTO: 0.37 K/UL (ref 0–0.85)
MONOCYTES NFR BLD AUTO: 5.7 % (ref 0–13.4)
NEUTROPHILS # BLD AUTO: 3.42 K/UL (ref 2–7.15)
NEUTROPHILS NFR BLD: 52.5 % (ref 44–72)
NITRITE UR QL STRIP.AUTO: NEGATIVE
NRBC # BLD AUTO: 0 K/UL
NRBC BLD AUTO-RTO: 0 /100 WBC
PH UR STRIP.AUTO: 5.5 [PH]
PLATELET # BLD AUTO: 216 K/UL (ref 164–446)
PMV BLD AUTO: 10.2 FL (ref 9–12.9)
POTASSIUM SERPL-SCNC: 4.6 MMOL/L (ref 3.6–5.5)
PROT SERPL-MCNC: 7.1 G/DL (ref 6–8.2)
PROT UR QL STRIP: NEGATIVE MG/DL
RBC # BLD AUTO: 4.8 M/UL (ref 4.2–5.4)
RBC # URNS HPF: ABNORMAL /HPF
RBC UR QL AUTO: ABNORMAL
SODIUM SERPL-SCNC: 141 MMOL/L (ref 135–145)
SP GR UR STRIP.AUTO: 1.02
WBC # BLD AUTO: 6.5 K/UL (ref 4.8–10.8)
WBC #/AREA URNS HPF: ABNORMAL /HPF

## 2017-04-03 PROCEDURE — 36415 COLL VENOUS BLD VENIPUNCTURE: CPT

## 2017-04-03 PROCEDURE — 80053 COMPREHEN METABOLIC PANEL: CPT

## 2017-04-03 PROCEDURE — 81001 URINALYSIS AUTO W/SCOPE: CPT

## 2017-04-03 PROCEDURE — 85025 COMPLETE CBC W/AUTO DIFF WBC: CPT

## 2017-04-05 ENCOUNTER — ANTICOAGULATION MONITORING (OUTPATIENT)
Dept: VASCULAR LAB | Facility: MEDICAL CENTER | Age: 57
End: 2017-04-05

## 2017-04-05 DIAGNOSIS — D68.51 FACTOR V LEIDEN (HCC): ICD-10-CM

## 2017-04-05 NOTE — PROGRESS NOTES
Anticoagulation Summary as of 4/5/2017     INR goal 2.5-3.5   Selected INR No new INR was available at the time of this encounter.   Maintenance plan 4.5 mg (3 mg x 1.5) on Mon, Wed, Fri; 3 mg (3 mg x 1) all other days   Weekly total 25.5 mg   Plan last modified Deacon Novak PHARMD (8/15/2016)   Next INR check 4/19/2017   Target end date Indefinite    Indications   Factor V Leiden (CMS-[facility]) [D68.51]  Deep vein thrombosis [453.40] (Resolved) [I82.409]  Pulmonary embolism [415.19] (Resolved) [I26.99]         Anticoagulation Episode Summary     INR check location [facility]    Preferred lab     Send INR reminders to     Comments PATIENT SELF MONITORING      Anticoagulation Care Providers     Provider Role Specialty Phone number    HERMINIA Diehl. Referring Family Medicine [phone]    Eliseo Eduardo M.D. Responsible Cardiology [phone]    FLY IrvingD Responsible          Anticoagulation Patient Findings    Patient will be holding warfarin for cervical fusion on 4-12-17.  She has bridged in the past and understands dosing.  She began holding warfarin today and her first lovenox this morning.  Instructions below.    4/5-4/10:  NO warfarin, Lovenox 100mg one time daily  4/11:  NO blood thinners   4/12:  Procedure day, restart warfarin at physician discretion, NO lovenox  4/13:  Warfarin, NO lovenox  4/14-continuing:  Warfarin, Lovenox 100mg one time daily until INR >2.5  4/19:  Check INR    Romero Gonzalez PHARMD

## 2017-04-12 ENCOUNTER — HOSPITAL ENCOUNTER (INPATIENT)
Facility: MEDICAL CENTER | Age: 57
LOS: 1 days | DRG: 472 | End: 2017-04-13
Attending: ORTHOPAEDIC SURGERY | Admitting: ORTHOPAEDIC SURGERY
Payer: MEDICARE

## 2017-04-12 ENCOUNTER — APPOINTMENT (OUTPATIENT)
Dept: RADIOLOGY | Facility: MEDICAL CENTER | Age: 57
DRG: 472 | End: 2017-04-12
Attending: ORTHOPAEDIC SURGERY
Payer: MEDICARE

## 2017-04-12 PROBLEM — D68.2 CONGENITAL DEFICIENCY OF OTHER CLOTTING FACTORS: Status: ACTIVE | Noted: 2017-04-12

## 2017-04-12 PROCEDURE — 700111 HCHG RX REV CODE 636 W/ 250 OVERRIDE (IP)

## 2017-04-12 PROCEDURE — 160031 HCHG SURGERY MINUTES - 1ST 30 MINS LEVEL 5: Performed by: ORTHOPAEDIC SURGERY

## 2017-04-12 PROCEDURE — 0RG2071 FUSION OF 2 OR MORE CERVICAL VERTEBRAL JOINTS WITH AUTOLOGOUS TISSUE SUBSTITUTE, POSTERIOR APPROACH, POSTERIOR COLUMN, OPEN APPROACH: ICD-10-PCS | Performed by: ORTHOPAEDIC SURGERY

## 2017-04-12 PROCEDURE — A9270 NON-COVERED ITEM OR SERVICE: HCPCS

## 2017-04-12 PROCEDURE — 700101 HCHG RX REV CODE 250

## 2017-04-12 PROCEDURE — 700112 HCHG RX REV CODE 229: Performed by: ORTHOPAEDIC SURGERY

## 2017-04-12 PROCEDURE — 502626 HCHG SURGIFLO HEMOSTATIC MATRIX 6ML: Performed by: ORTHOPAEDIC SURGERY

## 2017-04-12 PROCEDURE — 770006 HCHG ROOM/CARE - MED/SURG/GYN SEMI*

## 2017-04-12 PROCEDURE — C1713 ANCHOR/SCREW BN/BN,TIS/BN: HCPCS | Performed by: ORTHOPAEDIC SURGERY

## 2017-04-12 PROCEDURE — A9270 NON-COVERED ITEM OR SERVICE: HCPCS | Performed by: ORTHOPAEDIC SURGERY

## 2017-04-12 PROCEDURE — 700102 HCHG RX REV CODE 250 W/ 637 OVERRIDE(OP)

## 2017-04-12 PROCEDURE — 160048 HCHG OR STATISTICAL LEVEL 1-5: Performed by: ORTHOPAEDIC SURGERY

## 2017-04-12 PROCEDURE — 700111 HCHG RX REV CODE 636 W/ 250 OVERRIDE (IP): Performed by: ORTHOPAEDIC SURGERY

## 2017-04-12 PROCEDURE — 502240 HCHG MISC OR SUPPLY RC 0272: Performed by: ORTHOPAEDIC SURGERY

## 2017-04-12 PROCEDURE — 95940 IONM IN OPERATNG ROOM 15 MIN: CPT | Performed by: ORTHOPAEDIC SURGERY

## 2017-04-12 PROCEDURE — 160036 HCHG PACU - EA ADDL 30 MINS PHASE I: Performed by: ORTHOPAEDIC SURGERY

## 2017-04-12 PROCEDURE — 500367 HCHG DRAIN KIT, HEMOVAC: Performed by: ORTHOPAEDIC SURGERY

## 2017-04-12 PROCEDURE — 160009 HCHG ANES TIME/MIN: Performed by: ORTHOPAEDIC SURGERY

## 2017-04-12 PROCEDURE — A4606 OXYGEN PROBE USED W OXIMETER: HCPCS | Performed by: ORTHOPAEDIC SURGERY

## 2017-04-12 PROCEDURE — 500113 HCHG BONE, CHIPS CANC CRUSHED 15CC: Performed by: ORTHOPAEDIC SURGERY

## 2017-04-12 PROCEDURE — 700102 HCHG RX REV CODE 250 W/ 637 OVERRIDE(OP): Performed by: ORTHOPAEDIC SURGERY

## 2017-04-12 PROCEDURE — 700101 HCHG RX REV CODE 250: Performed by: ORTHOPAEDIC SURGERY

## 2017-04-12 PROCEDURE — 502000 HCHG MISC OR IMPLANTS RC 0278: Performed by: ORTHOPAEDIC SURGERY

## 2017-04-12 PROCEDURE — 110382 HCHG SHELL REV 271: Performed by: ORTHOPAEDIC SURGERY

## 2017-04-12 PROCEDURE — 160035 HCHG PACU - 1ST 60 MINS PHASE I: Performed by: ORTHOPAEDIC SURGERY

## 2017-04-12 PROCEDURE — 160042 HCHG SURGERY MINUTES - EA ADDL 1 MIN LEVEL 5: Performed by: ORTHOPAEDIC SURGERY

## 2017-04-12 PROCEDURE — 72040 X-RAY EXAM NECK SPINE 2-3 VW: CPT

## 2017-04-12 PROCEDURE — 501838 HCHG SUTURE GENERAL: Performed by: ORTHOPAEDIC SURGERY

## 2017-04-12 PROCEDURE — 160002 HCHG RECOVERY MINUTES (STAT): Performed by: ORTHOPAEDIC SURGERY

## 2017-04-12 DEVICE — DBM CHUNKY PROGENIX PLS 10.CC: Type: IMPLANTABLE DEVICE | Status: FUNCTIONAL

## 2017-04-12 DEVICE — BONE CHIPS CANC 4-10MM 15CC - CRUSHED  FREEZE DRIED ONLY: Type: IMPLANTABLE DEVICE | Status: FUNCTIONAL

## 2017-04-12 DEVICE — IMPLANTABLE DEVICE: Type: IMPLANTABLE DEVICE | Status: FUNCTIONAL

## 2017-04-12 RX ORDER — BUDESONIDE AND FORMOTEROL FUMARATE DIHYDRATE 160; 4.5 UG/1; UG/1
2 AEROSOL RESPIRATORY (INHALATION) 2 TIMES DAILY
Status: DISCONTINUED | OUTPATIENT
Start: 2017-04-12 | End: 2017-04-13 | Stop reason: HOSPADM

## 2017-04-12 RX ORDER — SODIUM CHLORIDE AND POTASSIUM CHLORIDE 150; 900 MG/100ML; MG/100ML
INJECTION, SOLUTION INTRAVENOUS CONTINUOUS
Status: DISCONTINUED | OUTPATIENT
Start: 2017-04-12 | End: 2017-04-13 | Stop reason: HOSPADM

## 2017-04-12 RX ORDER — FLUTICASONE PROPIONATE 50 MCG
1 SPRAY, SUSPENSION (ML) NASAL DAILY
Status: DISCONTINUED | OUTPATIENT
Start: 2017-04-12 | End: 2017-04-13 | Stop reason: HOSPADM

## 2017-04-12 RX ORDER — OXYCODONE HCL 5 MG/5 ML
SOLUTION, ORAL ORAL
Status: COMPLETED
Start: 2017-04-12 | End: 2017-04-12

## 2017-04-12 RX ORDER — LORAZEPAM 2 MG/ML
INJECTION INTRAMUSCULAR
Status: COMPLETED
Start: 2017-04-12 | End: 2017-04-12

## 2017-04-12 RX ORDER — OXYCODONE HYDROCHLORIDE 10 MG/1
20 TABLET ORAL
Status: DISCONTINUED | OUTPATIENT
Start: 2017-04-12 | End: 2017-04-13 | Stop reason: HOSPADM

## 2017-04-12 RX ORDER — ALBUTEROL SULFATE 90 UG/1
2 AEROSOL, METERED RESPIRATORY (INHALATION) EVERY 6 HOURS PRN
Status: DISCONTINUED | OUTPATIENT
Start: 2017-04-12 | End: 2017-04-13 | Stop reason: HOSPADM

## 2017-04-12 RX ORDER — PROMETHAZINE HYDROCHLORIDE 25 MG/1
12.5-25 TABLET ORAL EVERY 4 HOURS PRN
Status: DISCONTINUED | OUTPATIENT
Start: 2017-04-12 | End: 2017-04-13 | Stop reason: HOSPADM

## 2017-04-12 RX ORDER — LORAZEPAM 2 MG/ML
0.5 INJECTION INTRAMUSCULAR ONCE
Status: DISCONTINUED | OUTPATIENT
Start: 2017-04-12 | End: 2017-04-13 | Stop reason: HOSPADM

## 2017-04-12 RX ORDER — ENEMA 19; 7 G/133ML; G/133ML
1 ENEMA RECTAL
Status: DISCONTINUED | OUTPATIENT
Start: 2017-04-12 | End: 2017-04-13 | Stop reason: HOSPADM

## 2017-04-12 RX ORDER — POTASSIUM CHLORIDE 750 MG/1
10 TABLET, FILM COATED, EXTENDED RELEASE ORAL DAILY
Status: DISCONTINUED | OUTPATIENT
Start: 2017-04-12 | End: 2017-04-13 | Stop reason: HOSPADM

## 2017-04-12 RX ORDER — ONDANSETRON 2 MG/ML
4 INJECTION INTRAMUSCULAR; INTRAVENOUS EVERY 4 HOURS PRN
Status: DISCONTINUED | OUTPATIENT
Start: 2017-04-12 | End: 2017-04-13 | Stop reason: HOSPADM

## 2017-04-12 RX ORDER — BUPIVACAINE HYDROCHLORIDE AND EPINEPHRINE 5; 5 MG/ML; UG/ML
INJECTION, SOLUTION EPIDURAL; INTRACAUDAL; PERINEURAL
Status: DISCONTINUED | OUTPATIENT
Start: 2017-04-12 | End: 2017-04-12 | Stop reason: HOSPADM

## 2017-04-12 RX ORDER — POLYETHYLENE GLYCOL 3350 17 G/17G
1 POWDER, FOR SOLUTION ORAL 2 TIMES DAILY PRN
Status: DISCONTINUED | OUTPATIENT
Start: 2017-04-12 | End: 2017-04-13 | Stop reason: HOSPADM

## 2017-04-12 RX ORDER — METHOCARBAMOL 750 MG/1
750 TABLET, FILM COATED ORAL EVERY 8 HOURS PRN
Status: DISCONTINUED | OUTPATIENT
Start: 2017-04-12 | End: 2017-04-12

## 2017-04-12 RX ORDER — LIDOCAINE HYDROCHLORIDE 10 MG/ML
INJECTION, SOLUTION INFILTRATION; PERINEURAL
Status: COMPLETED
Start: 2017-04-12 | End: 2017-04-12

## 2017-04-12 RX ORDER — CEFAZOLIN SODIUM 2 G/100ML
2 INJECTION, SOLUTION INTRAVENOUS EVERY 8 HOURS
Status: COMPLETED | OUTPATIENT
Start: 2017-04-12 | End: 2017-04-13

## 2017-04-12 RX ORDER — FENTANYL 100 UG/1
1 PATCH TRANSDERMAL
Status: DISCONTINUED | OUTPATIENT
Start: 2017-04-12 | End: 2017-04-12

## 2017-04-12 RX ORDER — AMOXICILLIN 250 MG
1 CAPSULE ORAL NIGHTLY
Status: DISCONTINUED | OUTPATIENT
Start: 2017-04-12 | End: 2017-04-13 | Stop reason: HOSPADM

## 2017-04-12 RX ORDER — ACETAMINOPHEN 500 MG
TABLET ORAL
Status: DISPENSED
Start: 2017-04-12 | End: 2017-04-12

## 2017-04-12 RX ORDER — DOCUSATE SODIUM 100 MG/1
100 CAPSULE, LIQUID FILLED ORAL 2 TIMES DAILY
Status: DISCONTINUED | OUTPATIENT
Start: 2017-04-12 | End: 2017-04-13 | Stop reason: HOSPADM

## 2017-04-12 RX ORDER — SODIUM CHLORIDE, SODIUM LACTATE, POTASSIUM CHLORIDE, CALCIUM CHLORIDE 600; 310; 30; 20 MG/100ML; MG/100ML; MG/100ML; MG/100ML
1000 INJECTION, SOLUTION INTRAVENOUS
Status: COMPLETED | OUTPATIENT
Start: 2017-04-12 | End: 2017-04-12

## 2017-04-12 RX ORDER — ONDANSETRON 4 MG/1
4 TABLET, ORALLY DISINTEGRATING ORAL EVERY 4 HOURS PRN
Status: DISCONTINUED | OUTPATIENT
Start: 2017-04-12 | End: 2017-04-13 | Stop reason: HOSPADM

## 2017-04-12 RX ORDER — CARISOPRODOL 350 MG/1
350 TABLET ORAL EVERY 8 HOURS PRN
Status: DISCONTINUED | OUTPATIENT
Start: 2017-04-12 | End: 2017-04-13 | Stop reason: HOSPADM

## 2017-04-12 RX ORDER — DOCUSATE SODIUM 100 MG/1
100 CAPSULE, LIQUID FILLED ORAL 2 TIMES DAILY
Status: DISCONTINUED | OUTPATIENT
Start: 2017-04-12 | End: 2017-04-12

## 2017-04-12 RX ORDER — HYDROMORPHONE HYDROCHLORIDE 2 MG/ML
INJECTION, SOLUTION INTRAMUSCULAR; INTRAVENOUS; SUBCUTANEOUS
Status: COMPLETED
Start: 2017-04-12 | End: 2017-04-12

## 2017-04-12 RX ORDER — SIMVASTATIN 40 MG
40 TABLET ORAL EVERY EVENING
Status: DISCONTINUED | OUTPATIENT
Start: 2017-04-12 | End: 2017-04-13 | Stop reason: HOSPADM

## 2017-04-12 RX ORDER — ROPINIROLE 2 MG/1
4 TABLET, FILM COATED ORAL NIGHTLY
Status: DISCONTINUED | OUTPATIENT
Start: 2017-04-12 | End: 2017-04-13 | Stop reason: HOSPADM

## 2017-04-12 RX ORDER — GABAPENTIN 300 MG/1
CAPSULE ORAL
Status: DISPENSED
Start: 2017-04-12 | End: 2017-04-12

## 2017-04-12 RX ORDER — TEMAZEPAM 15 MG/1
30 CAPSULE ORAL NIGHTLY PRN
Status: DISCONTINUED | OUTPATIENT
Start: 2017-04-12 | End: 2017-04-13 | Stop reason: HOSPADM

## 2017-04-12 RX ORDER — LORATADINE 10 MG/1
10 TABLET ORAL DAILY
Status: DISCONTINUED | OUTPATIENT
Start: 2017-04-12 | End: 2017-04-13 | Stop reason: HOSPADM

## 2017-04-12 RX ORDER — ACETAMINOPHEN 500 MG
1000 TABLET ORAL EVERY 6 HOURS
Status: DISCONTINUED | OUTPATIENT
Start: 2017-04-12 | End: 2017-04-13 | Stop reason: HOSPADM

## 2017-04-12 RX ORDER — OXYCODONE HYDROCHLORIDE 10 MG/1
10 TABLET ORAL
Status: DISCONTINUED | OUTPATIENT
Start: 2017-04-12 | End: 2017-04-13 | Stop reason: HOSPADM

## 2017-04-12 RX ORDER — BISACODYL 10 MG
10 SUPPOSITORY, RECTAL RECTAL
Status: DISCONTINUED | OUTPATIENT
Start: 2017-04-12 | End: 2017-04-13 | Stop reason: HOSPADM

## 2017-04-12 RX ORDER — DIPHENHYDRAMINE HYDROCHLORIDE 50 MG/ML
25 INJECTION INTRAMUSCULAR; INTRAVENOUS EVERY 6 HOURS PRN
Status: DISCONTINUED | OUTPATIENT
Start: 2017-04-12 | End: 2017-04-13 | Stop reason: HOSPADM

## 2017-04-12 RX ORDER — AMOXICILLIN 250 MG
1 CAPSULE ORAL
Status: DISCONTINUED | OUTPATIENT
Start: 2017-04-12 | End: 2017-04-13 | Stop reason: HOSPADM

## 2017-04-12 RX ORDER — PROMETHAZINE HYDROCHLORIDE 12.5 MG/1
12.5-25 SUPPOSITORY RECTAL EVERY 4 HOURS PRN
Status: DISCONTINUED | OUTPATIENT
Start: 2017-04-12 | End: 2017-04-13 | Stop reason: HOSPADM

## 2017-04-12 RX ORDER — WARFARIN SODIUM 3 MG/1
3-4.5 TABLET ORAL DAILY
COMMUNITY
End: 2017-11-07

## 2017-04-12 RX ORDER — DIPHENHYDRAMINE HCL 25 MG
25 TABLET ORAL EVERY 6 HOURS PRN
Status: DISCONTINUED | OUTPATIENT
Start: 2017-04-12 | End: 2017-04-13 | Stop reason: HOSPADM

## 2017-04-12 RX ORDER — FENTANYL 100 UG/1
1 PATCH TRANSDERMAL
Status: DISCONTINUED | OUTPATIENT
Start: 2017-04-13 | End: 2017-04-13 | Stop reason: HOSPADM

## 2017-04-12 RX ORDER — FERROUS SULFATE 325(65) MG
325 TABLET ORAL DAILY
Status: DISCONTINUED | OUTPATIENT
Start: 2017-04-12 | End: 2017-04-13 | Stop reason: HOSPADM

## 2017-04-12 RX ADMIN — POTASSIUM CHLORIDE 10 MEQ: 750 TABLET, FILM COATED, EXTENDED RELEASE ORAL at 19:59

## 2017-04-12 RX ADMIN — HYDROMORPHONE HYDROCHLORIDE 0.25 MG: 1 INJECTION, SOLUTION INTRAMUSCULAR; INTRAVENOUS; SUBCUTANEOUS at 16:11

## 2017-04-12 RX ADMIN — OXYCODONE HYDROCHLORIDE 20 MG: 10 TABLET ORAL at 19:59

## 2017-04-12 RX ADMIN — HYDROMORPHONE HYDROCHLORIDE 0.5 MG: 2 INJECTION INTRAMUSCULAR; INTRAVENOUS; SUBCUTANEOUS at 15:15

## 2017-04-12 RX ADMIN — HYDROMORPHONE HYDROCHLORIDE 0.5 MG: 2 INJECTION INTRAMUSCULAR; INTRAVENOUS; SUBCUTANEOUS at 15:35

## 2017-04-12 RX ADMIN — CEFAZOLIN SODIUM 2 G: 2 INJECTION, SOLUTION INTRAVENOUS at 20:56

## 2017-04-12 RX ADMIN — LORATADINE 10 MG: 10 TABLET ORAL at 20:03

## 2017-04-12 RX ADMIN — POTASSIUM CHLORIDE AND SODIUM CHLORIDE: 900; 150 INJECTION, SOLUTION INTRAVENOUS at 20:10

## 2017-04-12 RX ADMIN — HYDROMORPHONE HYDROCHLORIDE 1 MG: 1 INJECTION, SOLUTION INTRAMUSCULAR; INTRAVENOUS; SUBCUTANEOUS at 20:55

## 2017-04-12 RX ADMIN — TEMAZEPAM 30 MG: 15 CAPSULE ORAL at 23:21

## 2017-04-12 RX ADMIN — BUDESONIDE AND FORMOTEROL FUMARATE DIHYDRATE 2 PUFF: 160; 4.5 AEROSOL RESPIRATORY (INHALATION) at 21:05

## 2017-04-12 RX ADMIN — STANDARDIZED SENNA CONCENTRATE AND DOCUSATE SODIUM 1 TABLET: 8.6; 5 TABLET, FILM COATED ORAL at 19:59

## 2017-04-12 RX ADMIN — OXYCODONE HYDROCHLORIDE 20 MG: 10 TABLET ORAL at 23:20

## 2017-04-12 RX ADMIN — LORAZEPAM 0.5 MG: 2 INJECTION INTRAMUSCULAR; INTRAVENOUS at 15:58

## 2017-04-12 RX ADMIN — HYDROMORPHONE HYDROCHLORIDE 0.25 MG: 1 INJECTION, SOLUTION INTRAMUSCULAR; INTRAVENOUS; SUBCUTANEOUS at 18:29

## 2017-04-12 RX ADMIN — HYDROMORPHONE HYDROCHLORIDE 0.5 MG: 2 INJECTION INTRAMUSCULAR; INTRAVENOUS; SUBCUTANEOUS at 15:22

## 2017-04-12 RX ADMIN — EPHEDRINE SULFATE 30 MG: 50 INJECTION INTRAMUSCULAR; INTRAVENOUS; SUBCUTANEOUS at 17:30

## 2017-04-12 RX ADMIN — FERROUS SULFATE TAB 325 MG (65 MG ELEMENTAL FE) 325 MG: 325 (65 FE) TAB at 19:59

## 2017-04-12 RX ADMIN — OXYCODONE HYDROCHLORIDE 10 MG: 5 SOLUTION ORAL at 15:36

## 2017-04-12 RX ADMIN — LIDOCAINE HYDROCHLORIDE: 10 INJECTION, SOLUTION INFILTRATION; PERINEURAL at 10:45

## 2017-04-12 RX ADMIN — DOCUSATE SODIUM 100 MG: 100 CAPSULE ORAL at 19:59

## 2017-04-12 RX ADMIN — HYDROMORPHONE HYDROCHLORIDE 0.5 MG: 2 INJECTION INTRAMUSCULAR; INTRAVENOUS; SUBCUTANEOUS at 15:46

## 2017-04-12 RX ADMIN — SODIUM CHLORIDE, SODIUM LACTATE, POTASSIUM CHLORIDE, CALCIUM CHLORIDE 1000 ML: 600; 310; 30; 20 INJECTION, SOLUTION INTRAVENOUS at 10:00

## 2017-04-12 RX ADMIN — LORAZEPAM 0.5 MG: 2 INJECTION INTRAMUSCULAR; INTRAVENOUS at 16:00

## 2017-04-12 RX ADMIN — SIMVASTATIN 40 MG: 40 TABLET, FILM COATED ORAL at 19:59

## 2017-04-12 RX ADMIN — ACETAMINOPHEN 1000 MG: 500 TABLET, FILM COATED ORAL at 19:58

## 2017-04-12 RX ADMIN — CARISOPRODOL 350 MG: 350 TABLET ORAL at 23:21

## 2017-04-12 ASSESSMENT — PAIN SCALES - GENERAL
PAINLEVEL_OUTOF10: 7
PAINLEVEL_OUTOF10: 6
PAINLEVEL_OUTOF10: 9
PAINLEVEL_OUTOF10: 5
PAINLEVEL_OUTOF10: 7
PAINLEVEL_OUTOF10: 0
PAINLEVEL_OUTOF10: ASSUMED PAIN PRESENT
PAINLEVEL_OUTOF10: 5

## 2017-04-12 NOTE — IP AVS SNAPSHOT
BuyerMLS Access Code: [email or web address]  Expires: 4/21/2017  9:52 AM    BuyerMLS  A secure, online tool to manage your health information     [employer] BuyerMLS® is a secure, online tool that connects you to your personalized health information from the privacy of your home -- day or night - making it very easy for you to manage your healthcare. Once the activation process is completed, you can even access your medical information using the BuyerMLS gilmar, which is available for free in the [employer] store or Google Play store.     BuyerMLS provides the following levels of access (as shown below):   My Chart Features   [facility] Primary Care Doctor [facility]  Specialists [facility]  Urgent  Care Non-[facility]  Primary Care  Doctor   Email your healthcare team securely and privately 24/7 X X X X   Manage appointments: schedule your next appointment; view details of past/upcoming appointments X      Request prescription refills. X      View recent personal medical records, including lab and immunizations X X X X   View health record, including health history, allergies, medications X X X X   Read reports about your outpatient visits, procedures, consult and ER notes X X X X   See your discharge summary, which is a recap of your hospital and/or ER visit that includes your diagnosis, lab results, and care plan. X X       How to register for BuyerMLS:  1. Go to  [email or web address].  2. Click on the Sign Up Now box, which takes you to the New Member Sign Up page. You will need to provide the following information:  a. Enter your BuyerMLS Access Code exactly as it appears at the top of this page. (You will not need to use this code after you’ve completed the sign-up process. If you do not sign up before the expiration date, you must request a new code.)   b. Enter your date of birth.   c. Enter your home email address.   d. Click Submit, and follow the next screen’s instructions.  3. Create a BuyerMLS ID. This will be your BuyerMLS  login ID and cannot be changed, so think of one that is secure and easy to remember.  4. Create a [facility] password. You can change your password at any time.  5. Enter your Password Reset Question and Answer. This can be used at a later time if you forget your password.   6. Enter your e-mail address. This allows you to receive e-mail notifications when new information is available in [facility].  7. Click Sign Up. You can now view your health information.    For assistance activating your [employer] account, call [phone]

## 2017-04-12 NOTE — IP AVS SNAPSHOT
" Home Care Instructions                                                                                                                  Name:Phyllis Perez  Medical Record Number:[id number]  CSN: [id number]    YOB: 1960   Age: 56 y.o.  Sex: female  HT:1.702 m (5' 7\") WT: 61.4 kg (135 lb 5.8 oz)          Admit Date: 4/12/2017     Discharge Date:   Today's Date: 4/13/2017  Attending Doctor:  Alejandro Rayo M.D.                  Allergies:  Bactrim; Morphine; Other misc; and Tape            Discharge Instructions           Discharge Instructions  LOVENOX DAILY, Next dose due tomorrow 4/14/2017. Start Coumadin tomorrow. Please call [facility] tomorrow morning to update them on recent surgery and medications.  Okay to shower with incision covered, keep dry for next 3 days.    Discharged to home by car with relative. Discharged via wheelchair, hospital escort: Yes.  Special equipment needed: C-Collar and Oxygen    Be sure to schedule a follow-up appointment with your primary care doctor or any specialists as instructed.     Discharge Plan:   Diet Plan: Discussed  Activity Level: Discussed  Confirmed Follow up Appointment: Patient to Call and Schedule Appointment  Confirmed Symptoms Management: Discussed  Medication Reconciliation Updated: Yes    I understand that a diet low in cholesterol, fat, and sodium is recommended for good health. Unless I have been given specific instructions below for another diet, I accept this instruction as my diet prescription.   Other diet: Diet as tolerated    Special Instructions: Discharge instructions for the Orthopedic Patient    Follow up with Primary Care Physician within 2 weeks of discharge to home, regarding:  Review of medications and diagnostic testing.  Surveillance for medical complications.  Workup and treatment of osteoporosis, if appropriate.     -Is this a Joint Replacement patient? No    -Is this patient being discharged with medication to prevent blood " clots?  Yes, Lovenox Enoxaparin injection  What is this medicine?  ENOXAPARIN (ee nox a PA rin) is used after knee, hip, or abdominal surgeries to prevent blood clotting. It is also used to treat existing blood clots in the lungs or in the veins.  This medicine may be used for other purposes; ask your health care provider or pharmacist if you have questions.  COMMON BRAND NAME(S): Lovenox  What should I tell my health care provider before I take this medicine?  They need to know if you have any of these conditions:  -bleeding disorders, hemorrhage, or hemophilia  -infection of the heart or heart valves  -kidney or liver disease  -previous stroke  -prosthetic heart valve  -recent surgery or delivery of a baby  -ulcer in the stomach or intestine, diverticulitis, or other bowel disease  -an unusual or allergic reaction to enoxaparin, heparin, pork or pork products, other medicines, foods, dyes, or preservatives  -pregnant or trying to get pregnant  -breast-feeding  How should I use this medicine?  This medicine is for injection under the skin. It is usually given by a health-care professional. You or a family member may be trained on how to give the injections. If you are to give yourself injections, make sure you understand how to use the syringe, measure the dose if necessary, and give the injection. To avoid bruising, do not rub the site where this medicine has been injected. Do not take your medicine more often than directed. Do not stop taking except on the advice of your doctor or health care professional.  Make sure you receive a puncture-resistant container to dispose of the needles and syringes once you have finished with them. Do not reuse these items. Return the container to your doctor or health care professional for proper disposal.  Talk to your pediatrician regarding the use of this medicine in children. Special care may be needed.  Overdosage: If you think you have taken too much of this medicine  contact a poison control center or emergency room at once.  NOTE: This medicine is only for you. Do not share this medicine with others.  What if I miss a dose?  If you miss a dose, take it as soon as you can. If it is almost time for your next dose, take only that dose. Do not take double or extra doses.  What may interact with this medicine?  Do not take this medicine with any of the following medications:  -aspirin and aspirin-like medicines  -heparin  -mifepristone  -palifermin  -warfarin   This medicine may also interact with the following medications:  -cilostazol  -clopidogrel  -dipyridamole  -NSAIDs, medicines for pain and inflammation, like ibuprofen or naproxen  -sulfinpyrazone  -ticlopidine  This list may not describe all possible interactions. Give your health care provider a list of all the medicines, herbs, non-prescription drugs, or dietary supplements you use. Also tell them if you smoke, drink alcohol, or use illegal drugs. Some items may interact with your medicine.  What should I watch for while using this medicine?  Visit your doctor or health care professional for regular checks on your progress. Your condition will be monitored carefully while you are receiving this medicine.  If you are going to have surgery, tell your doctor or health care professional that you are taking this medicine.  Do not stop taking this medicine without first talking to your doctor. Be sure to refill your prescription before you run out of medicine.  Avoid sports and activities that might cause injury while you are using this medicine. Severe falls or injuries can cause unseen bleeding. Be careful when using sharp tools or knives. Consider using an electric razor. Take special care brushing or flossing your teeth. Report any injuries, bruising, or red spots on the skin to your doctor or health care professional.  What side effects may I notice from receiving this medicine?  Side effects that you should report to your  doctor or health care professional as soon as possible:  -allergic reactions like skin rash, itching or hives, swelling of the face, lips, or tongue  -dark urine  -feeling faint or lightheaded, falls  -fever  -heavy menstrual bleeding  -signs and symptoms of bleeding such as bloody or black, tarry stools; red or dark-brown urine; spitting up blood or brown material that looks like coffee grounds; red spots on the skin; unusual bruising or bleeding from the eye, gums, or nose  -signs and symptoms of a blood clot such as breathing problems; changes in vision; chest pain; severe, sudden headache; pain, swelling, warmth in the leg; trouble speaking; sudden numbness or weakness of the face, arm or leg  Side effects that usually do not require medical attention (report to your doctor or health care professional if they continue or are bothersome):  -pain or irritation at the injection site  This list may not describe all possible side effects. Call your doctor for medical advice about side effects. You may report side effects to FDA at 5-141-FDA-3047.  Where should I keep my medicine?  Keep out of the reach of children.  Store at room temperature between 15 and 30 degrees C (59 and 86 degrees F). Do not freeze. If your injections have been specially prepared, you may need to store them in the refrigerator. Ask your pharmacist. Throw away any unused medicine after the expiration date.  NOTE: This sheet is a summary. It may not cover all possible information. If you have questions about this medicine, talk to your doctor, pharmacist, or health care provider.  © 2014, [employer]. (3/31/2014 10:04:27 AM)      · Is patient discharged on Warfarin / Coumadin?   Yes    You are receiving the drug warfarin. Please understand the importance of monitoring warfarin with scheduled PT/INR blood draws.  Follow-up with the [facility]in Clinic TOMORROW 4/14/2017    IMPORTANT: HOW TO USE THIS INFORMATION:  This is a summary and does  "NOT have all possible information about this product. This information does not assure that this product is safe, effective, or appropriate for you. This information is not individual medical advice and does not substitute for the advice of your health care professional. Always ask your health care professional for complete information about this product and your specific health needs.      WARFARIN - ORAL (WARF-uh-rin)      COMMON BRAND NAME(S): Coumadin      WARNING:  Warfarin can cause very serious (possibly fatal) bleeding. This is more likely to occur when you first start taking this medication or if you take too much warfarin. To decrease your risk for bleeding, your doctor or other health care provider will monitor you closely and check your lab results (INR test) to make sure you are not taking too much warfarin. Keep all medical and laboratory appointments. Tell your doctor right away if you notice any signs of serious bleeding. See also Side Effects section.      USES:  This medication is used to treat blood clots (such as in deep vein thrombosis-DVT or pulmonary embolus-PE) and/or to prevent new clots from forming in your body. Preventing harmful blood clots helps to reduce the risk of a stroke or heart attack. Conditions that increase your risk of developing blood clots include a certain type of irregular heart rhythm (atrial fibrillation), heart valve replacement, recent heart attack, and certain surgeries (such as hip/knee replacement). Warfarin is commonly called a \"blood thinner,\" but the more correct term is \"anticoagulant.\" It helps to keep blood flowing smoothly in your body by decreasing the amount of certain substances (clotting proteins) in your blood.      HOW TO USE:  Read the Medication Guide provided by your pharmacist before you start taking warfarin and each time you get a refill. If you have any questions, ask your doctor or pharmacist. Take this medication by mouth with or without food " as directed by your doctor or other health care professional, usually once a day. It is very important to take it exactly as directed. Do not increase the dose, take it more frequently, or stop using it unless directed by your doctor. Dosage is based on your medical condition, laboratory tests (such as INR), and response to treatment. Your doctor or other health care provider will monitor you closely while you are taking this medication to determine the right dose for you. Use this medication regularly to get the most benefit from it. To help you remember, take it at the same time each day. It is important to eat a balanced, consistent diet while taking warfarin. Some foods can affect how warfarin works in your body and may affect your treatment and dose. Avoid sudden large increases or decreases in your intake of foods high in vitamin K (such as broccoli, cauliflower, cabbage, brussels sprouts, kale, spinach, and other green leafy vegetables, liver, green tea, certain vitamin supplements). If you are trying to lose weight, check with your doctor before you try to go on a diet. Cranberry products may also affect how your warfarin works. Limit the amount of cranberry juice (16 ounces/480 milliliters a day) or other cranberry products you may drink or eat.      SIDE EFFECTS:  Nausea, loss of appetite, or stomach/abdominal pain may occur. If any of these effects persist or worsen, tell your doctor or pharmacist promptly. Remember that your doctor has prescribed this medication because he or she has judged that the benefit to you is greater than the risk of side effects. Many people using this medication do not have serious side effects. This medication can cause serious bleeding if it affects your blood clotting proteins too much (shown by unusually high INR lab results). Even if your doctor stops your medication, this risk of bleeding can continue for up to a week. Tell your doctor right away if you have any signs of  serious bleeding, including: unusual pain/swelling/discomfort, unusual/easy bruising, prolonged bleeding from cuts or gums, persistent/frequent nosebleeds, unusually heavy/prolonged menstrual flow, pink/dark urine, coughing up blood, vomit that is bloody or looks like coffee grounds, severe headache, dizziness/fainting, unusual or persistent tiredness/weakness, bloody/black/tarry stools, chest pain, shortness of breath, difficulty swallowing. Tell your doctor right away if any of these unlikely but serious side effects occur: persistent nausea/vomiting, severe stomach/abdominal pain, yellowing eyes/skin. This drug rarely has caused very serious (possibly fatal) problems if its effects lead to small blood clots (usually at the beginning of treatment). This can lead to severe skin/tissue damage that may require surgery or amputation if left untreated. Patients with certain blood conditions (protein C or S deficiency) may be at greater risk. Get medical help right away if any of these rare but serious side effects occur: painful/red/purplish patches on the skin (such as on the toe, breast, abdomen), change in the amount of urine, vision changes, confusion, slurred speech, weakness on one side of the body. A very serious allergic reaction to this drug is rare. However, get medical help right away if you notice any symptoms of a serious allergic reaction, including: rash, itching/swelling (especially of the face/tongue/throat), severe dizziness, trouble breathing. This is not a complete list of possible side effects. If you notice other effects not listed above, contact your doctor or pharmacist. In the [place] - Call your doctor for medical advice about side effects. You may report side effects to FDA at 4-107-TUN-5028. In [place] - Call your doctor for medical advice about side effects. You may report side effects to [facility] [place] at 1-512.438.7809.      PRECAUTIONS:  Before taking warfarin, tell your doctor or pharmacist if  you are allergic to it; or if you have any other allergies. This product may contain inactive ingredients, which can cause allergic reactions or other problems. Talk to your pharmacist for more details. Before using this medication, tell your doctor or pharmacist your medical history, especially of: blood disorders (such as anemia, hemophilia), bleeding problems (such as bleeding of the stomach/intestines, bleeding in the brain), blood vessel disorders (such as aneurysms), recent major injury/surgery, liver disease, alcohol use, mental/mood disorders (including memory problems), frequent falls/injuries. It is important that all your doctors and dentists know that you take warfarin. Before having surgery or any medical/dental procedures, tell your doctor or dentist that you are taking this medication and about all the products you use (including prescription drugs, nonprescription drugs, and herbal products). Avoid getting injections into the muscles. If you must have an injection into a muscle (for example, a flu shot), it should be given in the arm. This way, it will be easier to check for bleeding and/or apply pressure bandages. This medication may cause stomach bleeding. Daily use of alcohol while using this medicine will increase your risk for stomach bleeding and may also affect how this medication works. Limit or avoid alcoholic beverages. If you have not been eating well, if you have an illness or infection that causes fever, vomiting, or diarrhea for more than 2 days, or if you start using any antibiotic medications, contact your doctor or pharmacist immediately because these conditions can affect how warfarin works. This medication can cause heavy bleeding. To lower the chance of getting cut, bruised, or injured, use great caution with sharp objects like safety razors and nail cutters. Use an electric razor when shaving and a soft toothbrush when brushing your teeth. Avoid activities such as contact sports.  "If you fall or injure yourself, especially if you hit your head, call your doctor immediately. Your doctor may need to check you. The Food & Drug Administration has stated that generic warfarin products are interchangeable. However, consult your doctor or pharmacist before switching warfarin products. Be careful not to take more than one medication that contains warfarin unless specifically directed by the doctor or health care provider who is monitoring your warfarin treatment. Older adults may be at greater risk for bleeding while using this drug. This medication is not recommended for use during pregnancy because of serious (possibly fatal) harm to an unborn baby. Discuss the use of reliable forms of birth control with your doctor. If you become pregnant or think you may be pregnant, tell your doctor immediately. If you are planning pregnancy, discuss a plan for managing your condition with your doctor before you become pregnant. Your doctor may switch the type of medication you use during pregnancy. Very small amounts of this medication may pass into breast milk but is unlikely to harm a nursing infant. Consult your doctor before breast-feeding.      DRUG INTERACTIONS:  Drug interactions may change how your medications work or increase your risk for serious side effects. This document does not contain all possible drug interactions. Keep a list of all the products you use (including prescription/nonprescription drugs and herbal products) and share it with your doctor and pharmacist. Do not start, stop, or change the dosage of any medicines without your doctor's approval. Warfarin interacts with many prescription, nonprescription, vitamin, and herbal products. This includes medications that are applied to the skin or inside the vagina or rectum. The interactions with warfarin usually result in an increase or decrease in the \"blood-thinning\" (anticoagulant) effect. Your doctor or other health care professional " should closely monitor you to prevent serious bleeding or clotting problems. While taking warfarin, it is very important to tell your doctor or pharmacist of any changes in medications, vitamins, or herbal products that you are taking. Some products that may interact with this drug include: capecitabine, imatinib, mifepristone. Aspirin, aspirin-like drugs (salicylates), and nonsteroidal anti-inflammatory drugs (NSAIDs such as ibuprofen, naproxen, celecoxib) may have effects similar to warfarin. These drugs may increase the risk of bleeding problems if taken during treatment with warfarin. Carefully check all prescription/nonprescription product labels (including drugs applied to the skin such as pain-relieving creams) since the products may contain NSAIDs or salicylates. Talk to your doctor about using a different medication (such as acetaminophen) to treat pain/fever. Low-dose aspirin and related drugs (such as clopidogrel, ticlopidine) should be continued if prescribed by your doctor for specific medical reasons such as heart attack or stroke prevention. Consult your doctor or pharmacist for more details. Many herbal products interact with warfarin. Tell your doctor before taking any herbal products, especially bromelains, coenzyme Q10, cranberry, danshen, dong quai, fenugreek, garlic, ginkgo biloba, ginseng, and [place]'s wort, among others. This medication may interfere with a certain laboratory test to measure theophylline levels, possibly causing false test results. Make sure laboratory personnel and all your doctors know you use this drug.      OVERDOSE:  If overdose is suspected, contact a poison control center or emergency room immediately. [place] residents can call the [place] National Poison Hotline at 1-261.132.6738. [place] residents can call a provincial poison control center. Symptoms of overdose may include: bloody/black/tarry stools, pink/dark urine, unusual/prolonged bleeding.      NOTES:  Do not share this  medication with others. Laboratory and/or medical tests (such as INR, complete blood count) must be performed periodically to monitor your progress or check for side effects. Consult your doctor for more details.      MISSED DOSE:  For the best possible benefit, do not miss any doses. If you do miss a dose and remember on the same day, take it as soon as you remember. If you remember on the next day, skip the missed dose and resume your usual dosing schedule. Do not double the dose to catch up because this could increase your risk for bleeding. Keep a record of missed doses to give to your doctor or pharmacist. Contact your doctor or pharmacist if you miss 2 or more doses in a row.      STORAGE:  Store at room temperature away from light and moisture. Do not store in the bathroom. Keep all medications away from children and pets. Do not flush medications down the toilet or pour them into a drain unless instructed to do so. Properly discard this product when it is  or no longer needed. Consult your pharmacist or local waste disposal company for more details about how to safely discard your product.      MEDICAL ALERT:  Your condition and medication can cause complications in a medical emergency. For information about enrolling in MedicAlert, call 1-569.453.4722 ([place]) or 1-777.578.5851 ([place]).      Information last revised 2010 Copyright(c) 2010 [employer]             · Is patient Post Blood Transfusion?  No    Depression / Suicide Risk    As you are discharged from this [facility] facility, it is important to learn how to keep safe from harming yourself.    Recognize the warning signs:  · Abrupt changes in personality, positive or negative- including increase in energy   · Giving away possessions  · Change in eating patterns- significant weight changes-  positive or negative  · Change in sleeping patterns- unable to sleep or sleeping all the time   · Unwillingness or inability to  communicate  · Depression  · Unusual sadness, discouragement and loneliness  · Talk of wanting to die  · Neglect of personal appearance   · Rebelliousness- reckless behavior  · Withdrawal from people/activities they love  · Confusion- inability to concentrate     If you or a loved one observes any of these behaviors or has concerns about self-harm, here's what you can do:  · Talk about it- your feelings and reasons for harming yourself  · Remove any means that you might use to hurt yourself (examples: pills, rope, extension cords, firearm)  · Get professional help from the community (Mental Health, Substance Abuse, psychological counseling)  · Do not be alone:Call your Safe Contact- someone whom you trust who will be there for you.  · Call your local CRISIS HOTLINE 363-9400 or 895-030-6384  · Call your local Children's Mobile Crisis Response Team [place] (220) 644-1032 or www.[employer]  · Call the toll free National Suicide Prevention Hotlines   · National Suicide Prevention Lifeline 884-401-CWJO (6201)  · [employer] Line Network 800-SUICIDE (069-8619)            Your appointments     Jun 29, 2017 11:00 AM   FOLLOW UP with Uri Butts M.D.   [facility] (--)    [address], [address]  [place] [address] [phone]              Follow-up Information     1. Follow up with Alejandro Rayo M.D. In 2 weeks.    Specialty:  Orthopaedics    Contact information    [address]  [address]  [place] [address] [phone]           Discharge Medication Instructions:    Below are the medications your physician expects you to take upon discharge:    Review all your home medications and newly ordered medications with your doctor and/or pharmacist. Follow medication instructions as directed by your doctor and/or pharmacist.    Please keep your medication list with you and share with your physician.               Medication List      ASK your doctor about these  medications        Instructions    Morning Afternoon Evening Bedtime    ADVAIR DISKUS 250-50 MCG/DOSE Aepb   Generic drug:  fluticasone-salmeterol   Next Dose Due:  As prescribed        Inhale 1 Puff by mouth every day.   Dose:  1 Puff                        albuterol 108 (90 BASE) MCG/ACT Aers inhalation aerosol   Next Dose Due:  As prescribed        Inhale 2 Puffs by mouth every 6 hours as needed for Shortness of Breath.   Dose:  2 Puff                        aspirin 81 MG Chew chewable tablet   Commonly known as:  ASA   Next Dose Due:  As prescribed        Take 81 mg by mouth every day.   Dose:  81 mg                        carisoprodol 350 MG Tabs   Last time this was given:  350 mg on 4/13/2017  8:18 AM   Commonly known as:  SOMA   Next Dose Due:  As prescribed        Take 1 Tab by mouth every 8 hours as needed for Muscle Spasms. Fill at monthly intervals or greater.   Dose:  350 mg                        docusate sodium 100 MG Caps   Last time this was given:  100 mg on 4/13/2017  8:18 AM   Commonly known as:  COLACE   Next Dose Due:  4/13/2017 9pm        Take 1 Cap by mouth 2 times a day.   Dose:  100 mg                        enoxaparin 100 MG/ML Soln inj   Last time this was given:  100 mg on 4/13/2017  8:18 AM   Commonly known as:  LOVENOX   Next Dose Due:  4/14/2017        Inject 100 mg as instructed every day.   Dose:  100 mg                        fentanyl 100 MCG/HR Pt72   Last time this was given:  1 Patch on 4/13/2017  8:18 AM   Commonly known as:  DURAGESIC   Next Dose Due:  As prescribed        Apply 1 Patch to skin as directed every 72 hours.   Dose:  1 Patch                        ferrous sulfate 325 (65 FE) MG tablet   Last time this was given:  325 mg on 4/13/2017  8:19 AM   Next Dose Due:  4/14/2017        Take 1 Tab by mouth every day.   Dose:  325 mg                        fluticasone 50 MCG/ACT nasal spray   Commonly known as:  FLONASE   Next Dose Due:  As prescribed        Spray 1 Spray in  nose every day.   Dose:  1 Spray                        hydrocodone/acetaminophen  MG Tabs   Commonly known as:  NORCO   Next Dose Due:  As prescribed        Take 1 tablet by mouth every 4 hours as needed for breakthrough pain. The earliest date the pharmacy may fill the rx is 7/18/2016.                        Loratadine 10 MG Caps   Commonly known as:  CLARITIN   Next Dose Due:  4/14/2017        Take 1 Cap by mouth every day.   Dose:  1 Cap                        potassium chloride ER 10 MEQ tablet   Last time this was given:  10 mEq on 4/13/2017  8:18 AM   Commonly known as:  KLOR-CON   Next Dose Due:  4/14/2017        Take 1 Tab by mouth every day.   Dose:  1 Tab                        REQUIP 4 MG tablet   Generic drug:  ropinirole   Next Dose Due:  As prescribed        Take 4 mg by mouth every evening.   Dose:  4 mg                        simvastatin 40 MG Tabs   Last time this was given:  40 mg on 4/12/2017  7:59 PM   Commonly known as:  ZOCOR   Next Dose Due:  4/13/2017        Take 1 Tab by mouth every evening.   Dose:  40 mg                        temazepam 30 MG capsule   Last time this was given:  30 mg on 4/12/2017 11:21 PM   Commonly known as:  RESTORIL   Next Dose Due:  As prescribed        Take 1 Cap by mouth at bedtime as needed for Sleep. Fill at monthly intervals or greater.   Dose:  30 mg                        warfarin 3 MG Tabs   Commonly known as:  COUMADIN   Next Dose Due:  As prescribed        Take 3-4.5 mg by mouth every day. 4.5 mg on Mondays, Wednesdays , and Fridays . All other days 3 mg   Dose:  3-4.5 mg                                Instructions           Diet / Nutrition:    Follow any diet instructions given to you by your doctor or the dietician, including how much salt (sodium) you are allowed each day.    If you are overweight, talk to your doctor about a weight reduction plan.    Activity:    Remain physically active following your doctor's instructions about exercise and  activity.    Rest often.     Any time you become even a little tired or short of breath, SIT DOWN and rest.    Worsening Symptoms:    Report any of the following signs and symptoms to the doctor's office immediately:    *Pain of jaw, arm, or neck  *Chest pain not relieved by medication                               *Dizziness or loss of consciousness  *Difficulty breathing even when at rest   *More tired than usual                                       *Bleeding drainage or swelling of surgical site  *Swelling of feet, ankles, legs or stomach                 *Fever (>100ºF)  *Pink or blood tinged sputum  *Weight gain (3lbs/day or 5lbs /week)           *Shock from internal defibrillator (if applicable)  *Palpitations or irregular heartbeats                *Cool and/or numb extremities    Stroke Awareness    Common Risk Factors for Stroke include:    Age  Atrial Fibrillation  Carotid Artery Stenosis  Diabetes Mellitus  Excessive alcohol consumption  High blood pressure  Overweight   Physical inactivity  Smoking    Warning signs and symptoms of a stroke include:    *Sudden numbness or weakness of the face, arm or leg (especially on one side of the body).  *Sudden confusion, trouble speaking or understanding.  *Sudden trouble seeing in one or both eyes.  *Sudden trouble walking, dizziness, loss of balance or coordination.Sudden severe headache with no known cause.    It is very important to get treatment quickly when a stroke occurs. If you experience any of the above warning signs, call 911 immediately.                   Disclaimer         Quit Smoking / Tobacco Use:    I understand the use of any tobacco products increases my chance of suffering from future heart disease or stroke and could cause other illnesses which may shorten my life. Quitting the use of tobacco products is the single most important thing I can do to improve my health. For further information on smoking / tobacco cessation call a Toll Free Quit  Line at 1-763.381.5209 (*[employer] [place]) or 1-824.598.9610 (American [employer] Association) or you can access the web based program at www.lungusa.org.    [place] Tobacco Users Help Line:  (643) 623-9596       Toll Free: 1-956.242.5406  Quit Tobacco Program [facility] (859)356-2524    Crisis Hotline:    [place] Crisis Hotline:  7-857-KLNVXAJ or 1-476.374.8192    [place] Crisis Hotline:    1-437.577.7972 or 446-893-7291    Discharge Survey:   Thank you for choosing [facility]. We hope we did everything we could to make your hospital stay a pleasant one. You may be receiving a phone survey and we would appreciate your time and participation in answering the questions. Your input is very valuable to us in our efforts to improve our service to our patients and their families.        My signature on this form indicates that:    1. I have reviewed and understand the above information.  2. My questions regarding this information have been answered to my satisfaction.  3. I have formulated a plan with my discharge nurse to obtain my prescribed medications for home.                  Disclaimer         __________________________________                     __________       ________                       Patient Signature                                                 Date                    Time

## 2017-04-12 NOTE — OR SURGEON
Immediate Post-Operative Note      PreOp Diagnosis: DDD C4-6    PostOp Diagnosis: as above    Procedure(s):  CERVICAL FUSION POSTERIOR- WITH INSTRUMENTATION C4-5, C5-6 - Wound Class: Clean    Surgeon(s):  Alejandro Rayo M.D.    Anesthesiologist/Type of Anesthesia:  Anesthesiologist: Bayron Alvarez M.D./General    Surgical Staff:  Circulator: Janie Lopez R.N.  Relief Circulator: Love Cabezas R.N.  Relief Scrub: THERESA Mejia IV  Scrub Person: Lon Corral; Taylor Hair    Specimen: none    Estimated Blood Loss: min    Findings: see dictation    Complications: none        4/12/2017 1:16 PM Alejandro Rayo

## 2017-04-12 NOTE — IP AVS SNAPSHOT
" <p align=\"LEFT\"><IMG SRC=\"//[email or web address]$/[email or web address]\" alt=\"Image\" WIDTH=\"50%\" HEIGHT=\"200\" BORDER=\"\"></p>                   Name:Phyllis Perez  Medical Record Number:[id number]  CSN: [id number]    YOB: 1960   Age: 56 y.o.  Sex: female  HT:1.702 m (5' 7\") WT: 61.4 kg (135 lb 5.8 oz)          Admit Date: 4/12/2017     Discharge Date:   Today's Date: 4/13/2017  Attending Doctor:  Alejandro Rayo M.D.                  Allergies:  Bactrim; Morphine; Other misc; and Tape          Your appointments     Jun 29, 2017 11:00 AM   FOLLOW UP with Uri Butts M.D.   [facility] (--)    [address], [address]  [place] [id number] [phone]              Follow-up Information     1. Follow up with Alejandro Rayo M.D. In 2 weeks.    Specialty:  Orthopaedics    Contact information    [address]  [address]  [place] [address] [phone]           Medication List      Ask your Physician about these medications        Instructions    ADVAIR DISKUS 250-50 MCG/DOSE Aepb   Generic drug:  fluticasone-salmeterol    Inhale 1 Puff by mouth every day.   Dose:  1 Puff       albuterol 108 (90 BASE) MCG/ACT Aers inhalation aerosol    Inhale 2 Puffs by mouth every 6 hours as needed for Shortness of Breath.   Dose:  2 Puff       aspirin 81 MG Chew chewable tablet   Commonly known as:  ASA    Take 81 mg by mouth every day.   Dose:  81 mg       carisoprodol 350 MG Tabs   Commonly known as:  SOMA    Take 1 Tab by mouth every 8 hours as needed for Muscle Spasms. Fill at monthly intervals or greater.   Dose:  350 mg       docusate sodium 100 MG Caps   Commonly known as:  COLACE    Take 1 Cap by mouth 2 times a day.   Dose:  100 mg       enoxaparin 100 MG/ML Soln inj   Commonly known as:  LOVENOX    Inject 100 mg as instructed every day.   Dose:  100 mg       fentanyl 100 MCG/HR Pt72   Commonly known as:  DURAGESIC    Apply 1 Patch to skin as directed every 72 hours.   Dose:  1 Patch      " ferrous sulfate 325 (65 FE) MG tablet    Take 1 Tab by mouth every day.   Dose:  325 mg       fluticasone 50 MCG/ACT nasal spray   Commonly known as:  FLONASE    Spray 1 Spray in nose every day.   Dose:  1 Spray       hydrocodone/acetaminophen  MG Tabs   Commonly known as:  NORCO    Take 1 tablet by mouth every 4 hours as needed for breakthrough pain. The earliest date the pharmacy may fill the rx is 7/18/2016.       Loratadine 10 MG Caps   Commonly known as:  CLARITIN    Take 1 Cap by mouth every day.   Dose:  1 Cap       potassium chloride ER 10 MEQ tablet   Commonly known as:  KLOR-CON    Take 1 Tab by mouth every day.   Dose:  1 Tab       REQUIP 4 MG tablet   Generic drug:  ropinirole    Take 4 mg by mouth every evening.   Dose:  4 mg       simvastatin 40 MG Tabs   Commonly known as:  ZOCOR    Take 1 Tab by mouth every evening.   Dose:  40 mg       temazepam 30 MG capsule   Commonly known as:  RESTORIL    Take 1 Cap by mouth at bedtime as needed for Sleep. Fill at monthly intervals or greater.   Dose:  30 mg       warfarin 3 MG Tabs   Commonly known as:  COUMADIN    Take 3-4.5 mg by mouth every day. 4.5 mg on Mondays, Wednesdays , and Fridays . All other days 3 mg   Dose:  3-4.5 mg

## 2017-04-12 NOTE — IP AVS SNAPSHOT
4/13/2017    Phyllis Perez  [place] [address]    Dear Phyllis:    [facility] wants to ensure your discharge home is safe and you or your loved ones have had all of your questions answered regarding your care after you leave the hospital.    Below is a list of resources and contact information should you have any questions regarding your hospital stay, follow-up instructions, or active medical symptoms.    Questions or Concerns Regarding… Contact   Medical Questions Related to Your Discharge  (7 days a week, 8am-5pm) Contact a Nurse Care Coordinator   [phone]   Medical Questions Not Related to Your Discharge  (24 hours a day / 7 days a week)  Contact the Nurse Health Line   [phone]    Medications or Discharge Instructions Refer to your discharge packet   or contact your [facility] Primary Care Provider   [phone]   Follow-up Appointment(s) Schedule your appointment via [facility]   or contact Scheduling [phone]   Billing Review your statement via [facility]  or contact Billing [phone]   Medical Records Review your records via [employer]   or contact Medical Records [phone]     You may receive a telephone call within two days of discharge. This call is to make certain you understand your discharge instructions and have the opportunity to have any questions answered. You can also easily access your medical information, test results and upcoming appointments via the [employer] free online health management tool. You can learn more and sign up at [facility]/[employer]. For assistance setting up your [employer] account, please call [phone].    Once again, we want to ensure your discharge home is safe and that you have a clear understanding of any next steps in your care. If you have any questions or concerns, please do not hesitate to contact us, we are here for you. Thank you for choosing [facility] for your healthcare needs.    Sincerely,    Your [facility] Healthcare Team

## 2017-04-13 VITALS
RESPIRATION RATE: 18 BRPM | DIASTOLIC BLOOD PRESSURE: 55 MMHG | TEMPERATURE: 97.6 F | WEIGHT: 135.36 LBS | HEIGHT: 67 IN | OXYGEN SATURATION: 100 % | HEART RATE: 67 BPM | BODY MASS INDEX: 21.25 KG/M2 | SYSTOLIC BLOOD PRESSURE: 90 MMHG

## 2017-04-13 PROCEDURE — 700102 HCHG RX REV CODE 250 W/ 637 OVERRIDE(OP): Performed by: ORTHOPAEDIC SURGERY

## 2017-04-13 PROCEDURE — G8978 MOBILITY CURRENT STATUS: HCPCS | Mod: CI

## 2017-04-13 PROCEDURE — 700101 HCHG RX REV CODE 250: Performed by: ORTHOPAEDIC SURGERY

## 2017-04-13 PROCEDURE — A9270 NON-COVERED ITEM OR SERVICE: HCPCS | Performed by: ORTHOPAEDIC SURGERY

## 2017-04-13 PROCEDURE — 97165 OT EVAL LOW COMPLEX 30 MIN: CPT

## 2017-04-13 PROCEDURE — G8989 SELF CARE D/C STATUS: HCPCS | Mod: CI

## 2017-04-13 PROCEDURE — G8980 MOBILITY D/C STATUS: HCPCS | Mod: CI

## 2017-04-13 PROCEDURE — 700112 HCHG RX REV CODE 229: Performed by: ORTHOPAEDIC SURGERY

## 2017-04-13 PROCEDURE — G8988 SELF CARE GOAL STATUS: HCPCS | Mod: CI

## 2017-04-13 PROCEDURE — 700111 HCHG RX REV CODE 636 W/ 250 OVERRIDE (IP): Performed by: ORTHOPAEDIC SURGERY

## 2017-04-13 PROCEDURE — G8987 SELF CARE CURRENT STATUS: HCPCS | Mod: CI

## 2017-04-13 PROCEDURE — G8979 MOBILITY GOAL STATUS: HCPCS | Mod: CI

## 2017-04-13 PROCEDURE — 97161 PT EVAL LOW COMPLEX 20 MIN: CPT

## 2017-04-13 RX ADMIN — HYDROMORPHONE HYDROCHLORIDE 1 MG: 1 INJECTION, SOLUTION INTRAMUSCULAR; INTRAVENOUS; SUBCUTANEOUS at 00:16

## 2017-04-13 RX ADMIN — POTASSIUM CHLORIDE 10 MEQ: 750 TABLET, FILM COATED, EXTENDED RELEASE ORAL at 08:18

## 2017-04-13 RX ADMIN — FENTANYL 1 PATCH: 100 PATCH TRANSDERMAL at 08:18

## 2017-04-13 RX ADMIN — ACETAMINOPHEN 1000 MG: 500 TABLET, FILM COATED ORAL at 02:55

## 2017-04-13 RX ADMIN — OXYCODONE HYDROCHLORIDE 20 MG: 10 TABLET ORAL at 13:54

## 2017-04-13 RX ADMIN — ACETAMINOPHEN 1000 MG: 500 TABLET, FILM COATED ORAL at 12:23

## 2017-04-13 RX ADMIN — FERROUS SULFATE TAB 325 MG (65 MG ELEMENTAL FE) 325 MG: 325 (65 FE) TAB at 08:19

## 2017-04-13 RX ADMIN — LORATADINE 10 MG: 10 TABLET ORAL at 08:18

## 2017-04-13 RX ADMIN — CEFAZOLIN SODIUM 2 G: 2 INJECTION, SOLUTION INTRAVENOUS at 03:47

## 2017-04-13 RX ADMIN — OXYCODONE HYDROCHLORIDE 20 MG: 10 TABLET ORAL at 10:23

## 2017-04-13 RX ADMIN — HYDROMORPHONE HYDROCHLORIDE 1 MG: 1 INJECTION, SOLUTION INTRAMUSCULAR; INTRAVENOUS; SUBCUTANEOUS at 03:39

## 2017-04-13 RX ADMIN — DOCUSATE SODIUM 100 MG: 100 CAPSULE ORAL at 08:18

## 2017-04-13 RX ADMIN — ENOXAPARIN SODIUM 100 MG: 100 INJECTION SUBCUTANEOUS at 08:18

## 2017-04-13 RX ADMIN — OXYCODONE HYDROCHLORIDE 20 MG: 10 TABLET ORAL at 06:14

## 2017-04-13 RX ADMIN — BUDESONIDE AND FORMOTEROL FUMARATE DIHYDRATE 2 PUFF: 160; 4.5 AEROSOL RESPIRATORY (INHALATION) at 08:19

## 2017-04-13 RX ADMIN — POTASSIUM CHLORIDE AND SODIUM CHLORIDE 100 ML: 900; 150 INJECTION, SOLUTION INTRAVENOUS at 10:25

## 2017-04-13 RX ADMIN — OXYCODONE HYDROCHLORIDE 20 MG: 10 TABLET ORAL at 02:54

## 2017-04-13 RX ADMIN — CARISOPRODOL 350 MG: 350 TABLET ORAL at 08:18

## 2017-04-13 ASSESSMENT — PAIN SCALES - GENERAL
PAINLEVEL_OUTOF10: 9
PAINLEVEL_OUTOF10: 9
PAINLEVEL_OUTOF10: 5
PAINLEVEL_OUTOF10: 6
PAINLEVEL_OUTOF10: 8
PAINLEVEL_OUTOF10: 8
PAINLEVEL_OUTOF10: 5
PAINLEVEL_OUTOF10: 8
PAINLEVEL_OUTOF10: 8

## 2017-04-13 ASSESSMENT — GAIT ASSESSMENTS
ASSISTIVE DEVICE: FRONT WHEEL WALKER
DISTANCE (FEET): 500
GAIT LEVEL OF ASSIST: SUPERVISED

## 2017-04-13 ASSESSMENT — ACTIVITIES OF DAILY LIVING (ADL): TOILETING: INDEPENDENT

## 2017-04-13 NOTE — THERAPY
"57 y/o female with C4-6 DDD S/P posterior fusion in a hard cervical collar, 10 lb lifting restriction, PMH incls L/S sx, left TKA, right sh sx, congenital deficiency of clotting factors, candidate for right TKA presents with good logrolling in/oob mobility, safe and stable amb on level ground with FWW no LOB. Pain unchanged before, during and after PT intervention. Pt safe with functional mobility at this time. No further acute PT services.    Physical Therapy Evaluation completed.   Bed Mobility:  Supine to Sit: Supervised  Transfers: Sit to Stand: Supervised  Gait: Level Of Assist: Supervised with Front-Wheel Walker       Plan of Care: Patient with no further skilled PT needs in the acute care setting at this time  Discharge Recommendations: Equipment: No Equipment Needed. Post-acute therapy Currently anticipate no further skilled therapy needs once patient is discharged from the inpatient setting.    See \"Rehab Therapy-Acute\" Patient Summary Report for complete documentation.     "

## 2017-04-13 NOTE — PROGRESS NOTES
2 RN skin check completed with Priyanka Patel RN as patient arrived to floor after transfer to bed from [facility], no skin breakdown or wounds identified, skin intact, surgical incision to posterior neck with dry gauze dressing CDI and Aspen cervical collar in place.

## 2017-04-13 NOTE — DISCHARGE INSTRUCTIONS
Discharge Instructions  LOVENOX DAILY, Next dose due tomorrow 4/14/2017. Start Coumadin tomorrow. Please call [facility] tomorrow morning to update them on recent surgery and medications.  Okay to shower with incision covered, keep dry for next 3 days.    Discharged to home by car with relative. Discharged via wheelchair, hospital escort: Yes.  Special equipment needed: C-Collar and Oxygen    Be sure to schedule a follow-up appointment with your primary care doctor or any specialists as instructed.     Discharge Plan:   Diet Plan: Discussed  Activity Level: Discussed  Confirmed Follow up Appointment: Patient to Call and Schedule Appointment  Confirmed Symptoms Management: Discussed  Medication Reconciliation Updated: Yes    I understand that a diet low in cholesterol, fat, and sodium is recommended for good health. Unless I have been given specific instructions below for another diet, I accept this instruction as my diet prescription.   Other diet: Diet as tolerated    Special Instructions: Discharge instructions for the Orthopedic Patient    Follow up with Primary Care Physician within 2 weeks of discharge to home, regarding:  Review of medications and diagnostic testing.  Surveillance for medical complications.  Workup and treatment of osteoporosis, if appropriate.     -Is this a Joint Replacement patient? No    -Is this patient being discharged with medication to prevent blood clots?  Yes, Lovenox Enoxaparin injection  What is this medicine?  ENOXAPARIN (ee nox a PA rin) is used after knee, hip, or abdominal surgeries to prevent blood clotting. It is also used to treat existing blood clots in the lungs or in the veins.  This medicine may be used for other purposes; ask your health care provider or pharmacist if you have questions.  COMMON BRAND NAME(S): Lovenox  What should I tell my health care provider before I take this medicine?  They need to know if you have any of these conditions:  -bleeding  disorders, hemorrhage, or hemophilia  -infection of the heart or heart valves  -kidney or liver disease  -previous stroke  -prosthetic heart valve  -recent surgery or delivery of a baby  -ulcer in the stomach or intestine, diverticulitis, or other bowel disease  -an unusual or allergic reaction to enoxaparin, heparin, pork or pork products, other medicines, foods, dyes, or preservatives  -pregnant or trying to get pregnant  -breast-feeding  How should I use this medicine?  This medicine is for injection under the skin. It is usually given by a health-care professional. You or a family member may be trained on how to give the injections. If you are to give yourself injections, make sure you understand how to use the syringe, measure the dose if necessary, and give the injection. To avoid bruising, do not rub the site where this medicine has been injected. Do not take your medicine more often than directed. Do not stop taking except on the advice of your doctor or health care professional.  Make sure you receive a puncture-resistant container to dispose of the needles and syringes once you have finished with them. Do not reuse these items. Return the container to your doctor or health care professional for proper disposal.  Talk to your pediatrician regarding the use of this medicine in children. Special care may be needed.  Overdosage: If you think you have taken too much of this medicine contact a poison control center or emergency room at once.  NOTE: This medicine is only for you. Do not share this medicine with others.  What if I miss a dose?  If you miss a dose, take it as soon as you can. If it is almost time for your next dose, take only that dose. Do not take double or extra doses.  What may interact with this medicine?  Do not take this medicine with any of the following medications:  -aspirin and aspirin-like medicines  -heparin  -mifepristone  -palifermin  -warfarin   This medicine may also interact with  the following medications:  -cilostazol  -clopidogrel  -dipyridamole  -NSAIDs, medicines for pain and inflammation, like ibuprofen or naproxen  -sulfinpyrazone  -ticlopidine  This list may not describe all possible interactions. Give your health care provider a list of all the medicines, herbs, non-prescription drugs, or dietary supplements you use. Also tell them if you smoke, drink alcohol, or use illegal drugs. Some items may interact with your medicine.  What should I watch for while using this medicine?  Visit your doctor or health care professional for regular checks on your progress. Your condition will be monitored carefully while you are receiving this medicine.  If you are going to have surgery, tell your doctor or health care professional that you are taking this medicine.  Do not stop taking this medicine without first talking to your doctor. Be sure to refill your prescription before you run out of medicine.  Avoid sports and activities that might cause injury while you are using this medicine. Severe falls or injuries can cause unseen bleeding. Be careful when using sharp tools or knives. Consider using an electric razor. Take special care brushing or flossing your teeth. Report any injuries, bruising, or red spots on the skin to your doctor or health care professional.  What side effects may I notice from receiving this medicine?  Side effects that you should report to your doctor or health care professional as soon as possible:  -allergic reactions like skin rash, itching or hives, swelling of the face, lips, or tongue  -dark urine  -feeling faint or lightheaded, falls  -fever  -heavy menstrual bleeding  -signs and symptoms of bleeding such as bloody or black, tarry stools; red or dark-brown urine; spitting up blood or brown material that looks like coffee grounds; red spots on the skin; unusual bruising or bleeding from the eye, gums, or nose  -signs and symptoms of a blood clot such as breathing  problems; changes in vision; chest pain; severe, sudden headache; pain, swelling, warmth in the leg; trouble speaking; sudden numbness or weakness of the face, arm or leg  Side effects that usually do not require medical attention (report to your doctor or health care professional if they continue or are bothersome):  -pain or irritation at the injection site  This list may not describe all possible side effects. Call your doctor for medical advice about side effects. You may report side effects to FDA at 7-616-GAO-5277.  Where should I keep my medicine?  Keep out of the reach of children.  Store at room temperature between 15 and 30 degrees C (59 and 86 degrees F). Do not freeze. If your injections have been specially prepared, you may need to store them in the refrigerator. Ask your pharmacist. Throw away any unused medicine after the expiration date.  NOTE: This sheet is a summary. It may not cover all possible information. If you have questions about this medicine, talk to your doctor, pharmacist, or health care provider.  © 2014, [employer]. (3/31/2014 10:04:27 AM)      · Is patient discharged on Warfarin / Coumadin?   Yes    You are receiving the drug warfarin. Please understand the importance of monitoring warfarin with scheduled PT/INR blood draws.  Follow-up with the [facility]in Clinic TOMORROW 4/14/2017    IMPORTANT: HOW TO USE THIS INFORMATION:  This is a summary and does NOT have all possible information about this product. This information does not assure that this product is safe, effective, or appropriate for you. This information is not individual medical advice and does not substitute for the advice of your health care professional. Always ask your health care professional for complete information about this product and your specific health needs.      WARFARIN - ORAL (WARF-uh-rin)      COMMON BRAND NAME(S): Coumadin      WARNING:  Warfarin can cause very serious (possibly fatal) bleeding.  "This is more likely to occur when you first start taking this medication or if you take too much warfarin. To decrease your risk for bleeding, your doctor or other health care provider will monitor you closely and check your lab results (INR test) to make sure you are not taking too much warfarin. Keep all medical and laboratory appointments. Tell your doctor right away if you notice any signs of serious bleeding. See also Side Effects section.      USES:  This medication is used to treat blood clots (such as in deep vein thrombosis-DVT or pulmonary embolus-PE) and/or to prevent new clots from forming in your body. Preventing harmful blood clots helps to reduce the risk of a stroke or heart attack. Conditions that increase your risk of developing blood clots include a certain type of irregular heart rhythm (atrial fibrillation), heart valve replacement, recent heart attack, and certain surgeries (such as hip/knee replacement). Warfarin is commonly called a \"blood thinner,\" but the more correct term is \"anticoagulant.\" It helps to keep blood flowing smoothly in your body by decreasing the amount of certain substances (clotting proteins) in your blood.      HOW TO USE:  Read the Medication Guide provided by your pharmacist before you start taking warfarin and each time you get a refill. If you have any questions, ask your doctor or pharmacist. Take this medication by mouth with or without food as directed by your doctor or other health care professional, usually once a day. It is very important to take it exactly as directed. Do not increase the dose, take it more frequently, or stop using it unless directed by your doctor. Dosage is based on your medical condition, laboratory tests (such as INR), and response to treatment. Your doctor or other health care provider will monitor you closely while you are taking this medication to determine the right dose for you. Use this medication regularly to get the most benefit " from it. To help you remember, take it at the same time each day. It is important to eat a balanced, consistent diet while taking warfarin. Some foods can affect how warfarin works in your body and may affect your treatment and dose. Avoid sudden large increases or decreases in your intake of foods high in vitamin K (such as broccoli, cauliflower, cabbage, brussels sprouts, kale, spinach, and other green leafy vegetables, liver, green tea, certain vitamin supplements). If you are trying to lose weight, check with your doctor before you try to go on a diet. Cranberry products may also affect how your warfarin works. Limit the amount of cranberry juice (16 ounces/480 milliliters a day) or other cranberry products you may drink or eat.      SIDE EFFECTS:  Nausea, loss of appetite, or stomach/abdominal pain may occur. If any of these effects persist or worsen, tell your doctor or pharmacist promptly. Remember that your doctor has prescribed this medication because he or she has judged that the benefit to you is greater than the risk of side effects. Many people using this medication do not have serious side effects. This medication can cause serious bleeding if it affects your blood clotting proteins too much (shown by unusually high INR lab results). Even if your doctor stops your medication, this risk of bleeding can continue for up to a week. Tell your doctor right away if you have any signs of serious bleeding, including: unusual pain/swelling/discomfort, unusual/easy bruising, prolonged bleeding from cuts or gums, persistent/frequent nosebleeds, unusually heavy/prolonged menstrual flow, pink/dark urine, coughing up blood, vomit that is bloody or looks like coffee grounds, severe headache, dizziness/fainting, unusual or persistent tiredness/weakness, bloody/black/tarry stools, chest pain, shortness of breath, difficulty swallowing. Tell your doctor right away if any of these unlikely but serious side effects occur:  persistent nausea/vomiting, severe stomach/abdominal pain, yellowing eyes/skin. This drug rarely has caused very serious (possibly fatal) problems if its effects lead to small blood clots (usually at the beginning of treatment). This can lead to severe skin/tissue damage that may require surgery or amputation if left untreated. Patients with certain blood conditions (protein C or S deficiency) may be at greater risk. Get medical help right away if any of these rare but serious side effects occur: painful/red/purplish patches on the skin (such as on the toe, breast, abdomen), change in the amount of urine, vision changes, confusion, slurred speech, weakness on one side of the body. A very serious allergic reaction to this drug is rare. However, get medical help right away if you notice any symptoms of a serious allergic reaction, including: rash, itching/swelling (especially of the face/tongue/throat), severe dizziness, trouble breathing. This is not a complete list of possible side effects. If you notice other effects not listed above, contact your doctor or pharmacist. In the [place] - Call your doctor for medical advice about side effects. You may report side effects to FDA at 1-074-BYU-1736. In [place] - Call your doctor for medical advice about side effects. You may report side effects to [facility] [place] at 1-777.548.9365.      PRECAUTIONS:  Before taking warfarin, tell your doctor or pharmacist if you are allergic to it; or if you have any other allergies. This product may contain inactive ingredients, which can cause allergic reactions or other problems. Talk to your pharmacist for more details. Before using this medication, tell your doctor or pharmacist your medical history, especially of: blood disorders (such as anemia, hemophilia), bleeding problems (such as bleeding of the stomach/intestines, bleeding in the brain), blood vessel disorders (such as aneurysms), recent major injury/surgery, liver disease, alcohol  use, mental/mood disorders (including memory problems), frequent falls/injuries. It is important that all your doctors and dentists know that you take warfarin. Before having surgery or any medical/dental procedures, tell your doctor or dentist that you are taking this medication and about all the products you use (including prescription drugs, nonprescription drugs, and herbal products). Avoid getting injections into the muscles. If you must have an injection into a muscle (for example, a flu shot), it should be given in the arm. This way, it will be easier to check for bleeding and/or apply pressure bandages. This medication may cause stomach bleeding. Daily use of alcohol while using this medicine will increase your risk for stomach bleeding and may also affect how this medication works. Limit or avoid alcoholic beverages. If you have not been eating well, if you have an illness or infection that causes fever, vomiting, or diarrhea for more than 2 days, or if you start using any antibiotic medications, contact your doctor or pharmacist immediately because these conditions can affect how warfarin works. This medication can cause heavy bleeding. To lower the chance of getting cut, bruised, or injured, use great caution with sharp objects like safety razors and nail cutters. Use an electric razor when shaving and a soft toothbrush when brushing your teeth. Avoid activities such as contact sports. If you fall or injure yourself, especially if you hit your head, call your doctor immediately. Your doctor may need to check you. The Food & Drug Administration has stated that generic warfarin products are interchangeable. However, consult your doctor or pharmacist before switching warfarin products. Be careful not to take more than one medication that contains warfarin unless specifically directed by the doctor or health care provider who is monitoring your warfarin treatment. Older adults may be at greater risk for  "bleeding while using this drug. This medication is not recommended for use during pregnancy because of serious (possibly fatal) harm to an unborn baby. Discuss the use of reliable forms of birth control with your doctor. If you become pregnant or think you may be pregnant, tell your doctor immediately. If you are planning pregnancy, discuss a plan for managing your condition with your doctor before you become pregnant. Your doctor may switch the type of medication you use during pregnancy. Very small amounts of this medication may pass into breast milk but is unlikely to harm a nursing infant. Consult your doctor before breast-feeding.      DRUG INTERACTIONS:  Drug interactions may change how your medications work or increase your risk for serious side effects. This document does not contain all possible drug interactions. Keep a list of all the products you use (including prescription/nonprescription drugs and herbal products) and share it with your doctor and pharmacist. Do not start, stop, or change the dosage of any medicines without your doctor's approval. Warfarin interacts with many prescription, nonprescription, vitamin, and herbal products. This includes medications that are applied to the skin or inside the vagina or rectum. The interactions with warfarin usually result in an increase or decrease in the \"blood-thinning\" (anticoagulant) effect. Your doctor or other health care professional should closely monitor you to prevent serious bleeding or clotting problems. While taking warfarin, it is very important to tell your doctor or pharmacist of any changes in medications, vitamins, or herbal products that you are taking. Some products that may interact with this drug include: capecitabine, imatinib, mifepristone. Aspirin, aspirin-like drugs (salicylates), and nonsteroidal anti-inflammatory drugs (NSAIDs such as ibuprofen, naproxen, celecoxib) may have effects similar to warfarin. These drugs may increase " the risk of bleeding problems if taken during treatment with warfarin. Carefully check all prescription/nonprescription product labels (including drugs applied to the skin such as pain-relieving creams) since the products may contain NSAIDs or salicylates. Talk to your doctor about using a different medication (such as acetaminophen) to treat pain/fever. Low-dose aspirin and related drugs (such as clopidogrel, ticlopidine) should be continued if prescribed by your doctor for specific medical reasons such as heart attack or stroke prevention. Consult your doctor or pharmacist for more details. Many herbal products interact with warfarin. Tell your doctor before taking any herbal products, especially bromelains, coenzyme Q10, cranberry, danshen, dong quai, fenugreek, garlic, ginkgo biloba, ginseng, and [place]'s wort, among others. This medication may interfere with a certain laboratory test to measure theophylline levels, possibly causing false test results. Make sure laboratory personnel and all your doctors know you use this drug.      OVERDOSE:  If overdose is suspected, contact a poison control center or emergency room immediately. [place] residents can call the [place] National Poison Hotline at 1-893.562.5528. [place] residents can call a provincial poison control center. Symptoms of overdose may include: bloody/black/tarry stools, pink/dark urine, unusual/prolonged bleeding.      NOTES:  Do not share this medication with others. Laboratory and/or medical tests (such as INR, complete blood count) must be performed periodically to monitor your progress or check for side effects. Consult your doctor for more details.      MISSED DOSE:  For the best possible benefit, do not miss any doses. If you do miss a dose and remember on the same day, take it as soon as you remember. If you remember on the next day, skip the missed dose and resume your usual dosing schedule. Do not double the dose to catch up because this could  increase your risk for bleeding. Keep a record of missed doses to give to your doctor or pharmacist. Contact your doctor or pharmacist if you miss 2 or more doses in a row.      STORAGE:  Store at room temperature away from light and moisture. Do not store in the bathroom. Keep all medications away from children and pets. Do not flush medications down the toilet or pour them into a drain unless instructed to do so. Properly discard this product when it is  or no longer needed. Consult your pharmacist or local waste disposal company for more details about how to safely discard your product.      MEDICAL ALERT:  Your condition and medication can cause complications in a medical emergency. For information about enrolling in MedicAlert, call 1-674.903.1546 ([place]) or 1-692.112.8907 ([place]).      Information last revised 2010 Copyright(c)  [employer]             · Is patient Post Blood Transfusion?  No    Depression / Suicide Risk    As you are discharged from this [facility] facility, it is important to learn how to keep safe from harming yourself.    Recognize the warning signs:  · Abrupt changes in personality, positive or negative- including increase in energy   · Giving away possessions  · Change in eating patterns- significant weight changes-  positive or negative  · Change in sleeping patterns- unable to sleep or sleeping all the time   · Unwillingness or inability to communicate  · Depression  · Unusual sadness, discouragement and loneliness  · Talk of wanting to die  · Neglect of personal appearance   · Rebelliousness- reckless behavior  · Withdrawal from people/activities they love  · Confusion- inability to concentrate     If you or a loved one observes any of these behaviors or has concerns about self-harm, here's what you can do:  · Talk about it- your feelings and reasons for harming yourself  · Remove any means that you might use to hurt yourself (examples: pills, rope,  extension cords, firearm)  · Get professional help from the community (Mental Health, Substance Abuse, psychological counseling)  · Do not be alone:Call your Safe Contact- someone whom you trust who will be there for you.  · Call your local CRISIS HOTLINE 266-3256 or 189-859-7732  · Call your local Children's Mobile Crisis Response Team [place] (950) 867-9192 or www.Live [employer] 360  · Call the toll free National Suicide Prevention Hotlines   · National Suicide Prevention Lifeline 862-909-BNVG (9643)  · National Hope Line Network 800-SUICIDE (587-9073)

## 2017-04-13 NOTE — OP REPORT
DATE OF SERVICE:  04/12/2017    PREOPERATIVE DIAGNOSIS:  Disc degeneration, C4-C6.    POSTOPERATIVE DIAGNOSIS:  Disc degeneration C4-C6.    PROCEDURES RENDERED:  1.  Posterior spinal instrumentation, C4-C6 using DePuy Synthes system.  2.  Posterior cervical fusion, C4-C6.  3.  Application and removal of Arevalo tongs.  4.  Insertion of morselized allograft, corticocancellous bone chips together   with demineralized bone matrix Progenix Plus putty, C4-C6.    DRAINS:  Hemovac x1.    SPINAL CORD MONITORING:  Stable.    ANESTHESIA:  GETA.    PRIMARY SURGEON:  Alejandro Rayo MD    ASSISTANT:  MIKA Salazar    DISPOSITION:  Vital signs stable, extubated, taken back to recovery room in a   satisfactory stable condition.    INDICATIONS FOR PROCEDURE:  Please see attached clinicals.    PROCEDURE IN DETAIL:  After proper consent was obtained from patient, patient   was wheeled back to operating theater room #4.  Patient was placed in the   usual supine position, intubated under general anesthesia without any   difficulties.  Patient rolled onto the OSI table in usual prone position and   prepped and draped in the usual sterile fashion after Arevalo tongs were   appropriately applied and secured in the usual standard fashion.  Patient's   neck was secured in neutral position.  Spinal cord monitoring was induced.  No   changes seen from the beginning to end of the case.  Once this was done, the   patient was prepped and draped in the usual sterile fashion and antibiotics   were administered.  Incision was made in the upper cervical spine.  Full   thickness skin flaps were developed.  Fascia was incised midline and the   posterior elements from C3-C6 were appropriately exposed.  Once this was done,   the fascia was incised midline and the paraspinal muscles were reflected off   the cervical elements from C4-C6.  Lateral masses were appropriately exposed.    Intraoperative x-ray confirmed the level to be C4.   Starting the inferomedial   quadrant and using 25 degrees trajectories superiorly and laterally, the   trajectory was completed.  This was sounded in all 4 quadrants to make sure   there was not a violation of the trajectory and 14x3.5 mm screws were placed   appropriately.  Good alignment was seen both in the AP and lateral planes   using fluoroscopy.  Brent was appropriately cut and contoured and placed between   the screws and placed appropriately and caps were placed securing the brent to   the screws and were torqued appropriately.  Good alignment was seen both in   the AP and lateral planes using fluoroscopy.  The wound was copiously   irrigated with normal saline.  The posterior elements from C4-C6 were   appropriately exposed.  We decorticated using high speed power anastasiya and   Progenix Plus was mixed with corticocancellous bone chips and placed over the   decorticated region completing the fusion.  It must be noted that significant   hypermobility existed between C4-C5, C5-C6 and putting in the screws.    The fascial layer was closed using interrupted #1 Vicryl suture   figure-of-eight.  Suprafascial drain was placed.  Subcutaneous tissue closed   using 2-0 Vicryl suture.  Dermal edges were approximated using staples.  Drain   was sewn in appropriately.  Wound was dressed sterilely using Bacitracin   ointment, Xeroform, 4x4s, and tape.  Patient subsequently undraped, rolled   onto the stretcher in usual supine position.  Patient's tongs were   appropriately _____ and patient was rolled onto the stretcher in usual supine   position.  Arevalo tongs were appropriately removed.  Patient was placed in a   hard cervical collar.  Patient was extubated uneventfully and taken back to   recovery room in a satisfactory stable condition.  No complications arose.       ____________________________________     MD SEVERINO BAEZ / SHRUTHI    DD:  04/12/2017 13:21:27  DT:  04/12/2017 16:28:16    D#:  [id number]  Job#:   [id number]

## 2017-04-13 NOTE — PROGRESS NOTES
Patient discharged to home. Hemovac removed per Dr. Rayo. No IV access. Pain well controlled with PO medications. Voiding adequate amounts without difficulty. Ambulating with steady gait. Discharge instructions provided. Patient and family verbalized understanding of discharge instructions and have no further questions or concerns at this time

## 2017-04-13 NOTE — RESPIRATORY CARE
COPD EDUCATION by COPD CLINICAL EDUCATOR  4/13/2017 at 7:05 AM by Zoe Benitez     Patient reviewed by COPD education team. Patient does not qualify for COPD program.

## 2017-04-13 NOTE — PROGRESS NOTES
"Patient seen and examined  Complains of neck pain    Blood pressure 90/55, pulse 67, temperature 36.4 °C (97.6 °F), resp. rate 18, height 1.702 m (5' 7\"), weight 61.4 kg (135 lb 5.8 oz), SpO2 100 %.          Intake/Output Summary (Last 24 hours) at 04/13/17 1430  Last data filed at 04/13/17 0400   Gross per 24 hour   Intake    800 ml   Output      5 ml   Net    795 ml       No acute distress  Dressing clean dry and intact  Neurovascularly intact  HV output 24 hours min        POD#1    Plan:  DVT Prophylaxis- TEDS/SCDs  Weight Bearing Status-as tolerated  PT/OT  Antibiotics: ancef  Case Coordination: DC home today  C collar at all times  Maintain lovenox injections 100mg qd and can resume coumadin tomorrow, must call coumadin clinic tomorrow for further recommendations  F/u 2 weeks at [facility]      "

## 2017-04-13 NOTE — THERAPY
"Occupational Therapy Evaluation completed.   Functional Status: Supv supine > < EOB, SBA transfers with FWW, SBA transfers with FWW, supv LB dressing, supv toileting   Plan of Care: Patient with no further skilled OT needs in the acute care setting at this time  Discharge Recommendations:  Equipment: No Equipment Needed. Post-acute therapy: Currently anticipate no further skilled therapy needs once patient is discharged from the inpatient setting.    See \"Rehab Therapy-Acute\" Patient Summary Report for complete documentation.    56 y.o. female s/p C4-C6 posterior fusion. PLOF is assist with all I-ADL and some ADL. Pt has hired help as well as assist from [employer] at home. She is completing basic ADL and mobility with no more than CGA. No further acute OT needs at this time.     "

## 2017-04-13 NOTE — CARE PLAN
Problem: Safety  Goal: Will remain free from falls  Outcome: PROGRESSING AS EXPECTED  Patient alert and oriented, calls for assistance as appropriate, ambulates with steady gait,    Problem: Pain Management  Goal: Pain level will decrease to patient’s comfort goal  Outcome: PROGRESSING AS EXPECTED  Pain control improving with oral oxycodone and muscle relaxer

## 2017-04-13 NOTE — DISCHARGE PLANNING
Pt came in for cervical fusion. PT/OT stating pt is safe to D/C home as pt has hired help at home along with her fiance.

## 2017-04-13 NOTE — DISCHARGE PLANNING
Care Transition Team Final Discharge Disposition    Actual Discharge Information  Actual Discharge Date: 04/13/17  Care Transitions Team Assisting with Transportation: No  Actual Disposition: Discharged to home/self care (01)    Care Transition Team Assessment    Information Source  Orientation : Oriented x 4  Information Given By: Patient  Informant's Name: Phyllis  Who is responsible for making decisions for patient? : Patient    Readmission Evaluation  Is this a readmission?: No    Elopement Risk  Legal Hold: No  Ambulatory or Self Mobile in Wheelchair: Yes  Elopement Risk: Not at Risk for Elopement    Interdisciplinary Discharge Planning  Lives with - Patient's Self Care Capacity: Spouse  Housing / Facility: [address] [facility]  Prior Services: Intermittent Physical Support for ADL Per Family, Intermittent Physical Support for ADL Per Service    Discharge Preparedness  What is your plan after discharge?: Home with help  What are your discharge supports?: Partner, Other (comment) (Caregiver)  Prior Functional Level: Needs Assist with Activities of Daily Living, Needs Assist with Medication Management, Uses Cane, Uses Walker, Uses Wheelchair  Difficulity with ADLs: Bathing, Dressing, Walking  Difficulty with ADLs Comment: Caregiver helps  Difficulity with IADLs: Driving, Laundry, Managing medication, Shopping  Difficulity with IADL Comments: Caregiver helps    Functional Assesment  Prior Functional Level: Needs Assist with Activities of Daily Living, Needs Assist with Medication Management, Uses Cane, Uses Walker, Uses Wheelchair    Finances  Financial Barriers to Discharge: No  Prescription Coverage: Yes         Values / Beliefs / Concerns  Spiritual Requests During Hospitalization: No    Advance Directive  Advance Directive?: None  Advance Directive offered?: AD Booklet given    Domestic Abuse  Physical Abuse or Sexual Abuse: No  Verbal Abuse or Emotional Abuse: No  Possible Abuse Reported to:: Not  Applicable    Psychological Assessment  History of Substance Abuse: None  History of Psychiatric Problems: Yes (States diagnosed with Bipolar. No longer on meds. )  Non-compliant with Treatment: No  Newly Diagnosed Illness: No    Discharge Risks or Barriers  Discharge risks or barriers?: No    Anticipated Discharge Information  Anticipated discharge disposition: Home  Discharge Address: [facility]

## 2017-04-15 NOTE — DISCHARGE SUMMARY
HOSPITAL COURSE:  The patient's hospital course was unremarkable.  The patient   underwent surgical procedure without any complications.  She was transferred   to floor neurologically intact.  On postop day #1, her pain was controlled   appropriately and she was ambulating well and was moving her bowels and   bladder without any difficulty.  She was tolerating a diet.  She was   recommended and met all criteria in order to be discharged home at that time.    She was given Lovenox that morning per her Coumadin clinic orders.  Due to   the fact that she has got factor V Leiden deficiency, she is to call the   Coumadin clinic on Friday because she is to start her Coumadin on postop day   #2.  She is to continue her Lovenox until her Coumadin is therapeutic.  She is   to take oxycodone as well as a fentanyl per pain specialist.  She is to take   Keflex as previously instructed.  She is to continue all of her home meds   except for any anti-inflammatories for 3 months.  She is to avoid any heavy   lifting, bending activities.  She is to use ice modalities as well as   tolerated.  She is to follow up in 2 weeks' time at the [facility].  Once again her drain put out minimal fluid on postop day #1 and was   discontinued at that time.    She has called clinic if any problems arise.  I have told if she gets any   acute short of breath, or swelling in her extremities, she is to go to the   emergency room immediately.  Once again, we will call her on Friday to make   sure that she follows appropriately in the Coumadin clinic since she is a high   risk patient and needs to have INR monitored appropriately.       ____________________________________     MD SEVERINO BAEZ / SHRUTHI    DD:  04/14/2017 13:16:52  DT:  04/14/2017 22:53:03    D#:  [id number]  Job#:  [id number]

## 2017-05-05 DIAGNOSIS — D68.2 FACTOR V DEFICIENCY (HCC): ICD-10-CM

## 2017-05-05 LAB — INR PPP: 3.8 (ref 2–3.5)

## 2017-05-08 ENCOUNTER — ANTICOAGULATION MONITORING (OUTPATIENT)
Dept: VASCULAR LAB | Facility: MEDICAL CENTER | Age: 57
End: 2017-05-08

## 2017-05-08 DIAGNOSIS — D68.51 FACTOR V LEIDEN (HCC): ICD-10-CM

## 2017-05-08 NOTE — PROGRESS NOTES
Anticoagulation Summary as of 5/8/2017     INR goal 2.5-3.5   Selected INR 3.8! (5/5/2017)   Maintenance plan 4.5 mg (3 mg x 1.5) on Mon, Wed, Fri; 3 mg (3 mg x 1) all other days   Weekly total 25.5 mg   Plan last modified Deacon Novak, PHARMD (8/15/2016)   Next INR check 5/12/2017   Target end date Indefinite    Indications   Factor V Leiden (CMS-[facility]) [D68.51]  Deep vein thrombosis [453.40] (Resolved) [I82.409]  Pulmonary embolism [415.19] (Resolved) [I26.99]         Anticoagulation Episode Summary     INR check location [facility]    Preferred lab     Send INR reminders to     Comments PATIENT SELF MONITORING      Anticoagulation Care Providers     Provider Role Specialty Phone number    HERMINIA Diehl. Referring Family Medicine [phone]    Eliseo Eduardo M.D. Responsible Cardiology [phone]    Deacon Novak, PHARMD Responsible          Anticoagulation Patient Findings    Spoke to patient on phone. Resumed warfarin two days after her surgery. She bridged with enoxaparin x 7 days post op. Instructed to decrease warfarin to 3 mg tonight only then resume previously prescribed regimen. Follow up INR this Friday.    Deacon Novak, PHARMD

## 2017-05-12 ENCOUNTER — PATIENT OUTREACH (OUTPATIENT)
Dept: HEALTH INFORMATION MANAGEMENT | Facility: OTHER | Age: 57
End: 2017-05-12

## 2017-05-18 ENCOUNTER — ANTICOAGULATION MONITORING (OUTPATIENT)
Dept: VASCULAR LAB | Facility: MEDICAL CENTER | Age: 57
End: 2017-05-18

## 2017-05-18 DIAGNOSIS — D68.51 FACTOR V LEIDEN (HCC): ICD-10-CM

## 2017-05-18 LAB — INR PPP: 3.9 (ref 2–3.5)

## 2017-05-18 NOTE — PROGRESS NOTES
Anticoagulation Summary as of 5/18/2017     INR goal 2.5-3.5   Selected INR 3.9! (5/18/2017)   Maintenance plan 4.5 mg (3 mg x 1.5) on Mon, Wed, Fri; 3 mg (3 mg x 1) all other days   Weekly total 25.5 mg   Plan last modified Deacon Novak PHARMD (8/15/2016)   Next INR check 5/25/2017   Target end date Indefinite    Indications   Factor V Leiden (CMS-[facility]) [D68.51]  Deep vein thrombosis [453.40] (Resolved) [I82.409]  Pulmonary embolism [415.19] (Resolved) [I26.99]         Anticoagulation Episode Summary     INR check location [facility]    Preferred lab     Send INR reminders to     Comments PATIENT SELF MONITORING      Anticoagulation Care Providers     Provider Role Specialty Phone number    DERICK Diehl Referring Family Medicine [phone]    Eliseo Eduardo M.D. Responsible Cardiology [phone]    Deacon Novak, KRZYSZTOF Responsible          Anticoagulation Patient Findings    This patient is enrolled in the Patient Self Management Program    Deacon Novak, FLYD

## 2017-05-22 ENCOUNTER — ANTICOAGULATION MONITORING (OUTPATIENT)
Dept: VASCULAR LAB | Facility: MEDICAL CENTER | Age: 57
End: 2017-05-22

## 2017-05-22 DIAGNOSIS — D68.51 FACTOR V LEIDEN (HCC): ICD-10-CM

## 2017-06-09 ENCOUNTER — ANTICOAGULATION MONITORING (OUTPATIENT)
Dept: VASCULAR LAB | Facility: MEDICAL CENTER | Age: 57
End: 2017-06-09

## 2017-06-09 DIAGNOSIS — D68.51 FACTOR V LEIDEN (HCC): ICD-10-CM

## 2017-06-09 LAB — INR PPP: 3.8 (ref 2–3.5)

## 2017-06-09 NOTE — PROGRESS NOTES
Anticoagulation Summary as of 6/9/2017     INR goal 2.5-3.5   Selected INR 3.8! (6/9/2017)   Maintenance plan 4.5 mg (3 mg x 1.5) on Mon, Wed, Fri; 3 mg (3 mg x 1) all other days   Weekly total 25.5 mg   Plan last modified Deacon Novak PHARMD (8/15/2016)   Next INR check 6/16/2017   Target end date Indefinite    Indications   Factor V Leiden (CMS-[facility]) [D68.51]  Deep vein thrombosis [453.40] (Resolved) [I82.409]  Pulmonary embolism [415.19] (Resolved) [I26.99]         Anticoagulation Episode Summary     INR check location [facility]    Preferred lab     Send INR reminders to     Comments PATIENT SELF MONITORING      Anticoagulation Care Providers     Provider Role Specialty Phone number    DERICK Diehl Referring Family Medicine [phone]    Eliseo Eduardo M.D. Responsible Cardiology [phone]    Deacon Novak, KRZYSZTOF Responsible          Anticoagulation Patient Findings    This patient is enrolled in the Patient Self Management Program    Deaocn Novak, FLYD

## 2017-06-20 ENCOUNTER — ANTICOAGULATION MONITORING (OUTPATIENT)
Dept: VASCULAR LAB | Facility: MEDICAL CENTER | Age: 57
End: 2017-06-20

## 2017-06-20 DIAGNOSIS — D68.51 FACTOR V LEIDEN (HCC): ICD-10-CM

## 2017-06-20 LAB — INR PPP: 3.8 (ref 2–3.5)

## 2017-06-21 NOTE — PROGRESS NOTES
Anticoagulation Summary as of 6/20/2017     INR goal 2.5-3.5   Selected INR 3.8! (6/20/2017)   Maintenance plan 4.5 mg (3 mg x 1.5) on Mon, Wed, Fri; 3 mg (3 mg x 1) all other days   Weekly total 25.5 mg   Plan last modified Deacon Novak PHARMD (8/15/2016)   Next INR check 7/4/2017   Target end date Indefinite    Indications   Factor V Leiden (CMS-[facility]) [D68.51]  Deep vein thrombosis [453.40] (Resolved) [I82.409]  Pulmonary embolism [415.19] (Resolved) [I26.99]         Anticoagulation Episode Summary     INR check location [facility]    Preferred lab     Send INR reminders to     Comments PATIENT SELF MONITORING      Anticoagulation Care Providers     Provider Role Specialty Phone number    DERICK Diehl Referring Family Medicine [phone]    Eliseo Eduardo M.D. Responsible Cardiology [phone]    Deacon Novak PHARMD Responsible          Anticoagulation Patient Findings    This patient is enrolled in the Patient Self Management Program  Christiane Caal, FLYD

## 2017-06-29 ENCOUNTER — OFFICE VISIT (OUTPATIENT)
Dept: CARDIOLOGY | Facility: MEDICAL CENTER | Age: 57
End: 2017-06-29
Payer: MEDICARE

## 2017-06-29 VITALS
HEIGHT: 69 IN | OXYGEN SATURATION: 94 % | DIASTOLIC BLOOD PRESSURE: 60 MMHG | SYSTOLIC BLOOD PRESSURE: 100 MMHG | HEART RATE: 82 BPM | BODY MASS INDEX: 19.26 KG/M2 | WEIGHT: 130 LBS

## 2017-06-29 DIAGNOSIS — I25.10 CORONARY ARTERIOSCLEROSIS: ICD-10-CM

## 2017-06-29 DIAGNOSIS — E78.2 MIXED HYPERLIPIDEMIA: ICD-10-CM

## 2017-06-29 DIAGNOSIS — Z79.01 CHRONIC ANTICOAGULATION: ICD-10-CM

## 2017-06-29 DIAGNOSIS — R55 SYNCOPE, UNSPECIFIED SYNCOPE TYPE: ICD-10-CM

## 2017-06-29 DIAGNOSIS — D68.2 FACTOR V DEFICIENCY (HCC): ICD-10-CM

## 2017-06-29 PROCEDURE — 99214 OFFICE O/P EST MOD 30 MIN: CPT | Performed by: INTERNAL MEDICINE

## 2017-06-29 RX ORDER — ATORVASTATIN CALCIUM 40 MG/1
40 TABLET, FILM COATED ORAL DAILY
Qty: 30 TAB | Refills: 11 | Status: SHIPPED | OUTPATIENT
Start: 2017-06-29 | End: 2018-06-12 | Stop reason: SDUPTHER

## 2017-06-29 RX ORDER — FLUDROCORTISONE ACETATE 0.1 MG/1
0.1 TABLET ORAL DAILY
Qty: 30 TAB | Refills: 11 | Status: SHIPPED | OUTPATIENT
Start: 2017-06-29 | End: 2018-06-12 | Stop reason: SDUPTHER

## 2017-06-29 NOTE — PROGRESS NOTES
Subjective:   Phyllis Perez is a 56 y.o. female who presents today for follow-up with a history of coronary artery disease, hypercoagulable state, dyslipidemia and syncope. She also has a permanent pacemaker. She continues to have syncope couple of times a month. She feels is secondary to a drop in her oxygen saturation from her severe underlying lung disease.    She's had no anginal type chest discomfort or edema. She has dyspnea on exertion at about a half a block and oxygen therapy. She's had no PND or orthopnea but is on oxygen at night. She occasionally notes some palpitations and has a history rare brief mode switching on her pacemaker checks.    Past Medical History   Diagnosis Date   • CAD (coronary artery disease)      Old MI/POBA   • Emphysema (subcutaneous) (surgical) resulting from a procedure    • Factor V Leiden (CMS-HCC)    • History of blood clots      DVT with PE   • Anticoagulant prescribed    • Hyperlipemia, mixed    • Uterine cancer (CMS-HCC)    • Ovarian cancer (CMS-HCC)    • Hot flashes    • Pacemaker    • Asthma    • Old MI (myocardial infarction)      Treated with POBA; Cath without obstructive DZ 2009   • Anemia    • Chronic hypoxemic respiratory failure (CMS-HCC)    • SSS (sick sinus syndrome) (CMS-HCC)      Tx with pacer   • Sleep apnea      CPAP     Past Surgical History   Procedure Laterality Date   • Nasal reconstruction     • Angioplasty       ?2004   • Shoulder arthroplasty total reversed     • Elbow exploration       cts   • Cholecystectomy     • Lumbar exploration       buck   • Hysterectomy laparoscopy     • Oophorectomy     • Knee replacement, total     • Knee revision total     • Other surgical procedure       IVC filter placement   • Pacemaker insertion  2010     bradycardia   • Cervical fusion posterior  4/12/2017     Procedure: CERVICAL FUSION POSTERIOR- WITH INSTRUMENTATION C4-5, C5-6;  Surgeon: Alejandro Rayo M.D.;  Location: SURGERY [facility];  Service:      Family  History   Problem Relation Age of Onset   • Cancer Mother    • Cancer Father    • Cancer Maternal Aunt    • Cancer Maternal Uncle      History   Smoking status   • Former Smoker -- 15 years   • Quit date: 07/10/2001   Smokeless tobacco   • Never Used     Allergies   Allergen Reactions   • Bactrim [Sulfamethoxazole W-Trimethoprim]      constipation   • Morphine      Severe HA   • Other Misc Hives     cloraprep   • Tape      Blistering and then pop     Outpatient Encounter Prescriptions as of 6/29/2017   Medication Sig Dispense Refill   • warfarin (COUMADIN) 3 MG Tab Take 3-4.5 mg by mouth every day. 4.5 mg on Mondays, Wednesdays , and Fridays . All other days 3 mg     • potassium chloride ER (KLOR-CON) 10 MEQ tablet Take 1 Tab by mouth every day.  5   • ropinirole (REQUIP) 4 MG tablet Take 4 mg by mouth every evening.     • simvastatin (ZOCOR) 40 MG Tab Take 1 Tab by mouth every evening. 30 Tab 11   • temazepam (RESTORIL) 30 MG capsule Take 1 Cap by mouth at bedtime as needed for Sleep. Fill at monthly intervals or greater. 30 Cap 0   • fentanyl (DURAGESIC) 100 MCG/HR PATCH 72 HR Apply 1 Patch to skin as directed every 72 hours.  0   • ADVAIR DISKUS 250-50 MCG/DOSE AEROSOL POWDER, BREATH ACTIVATED Inhale 1 Puff by mouth every day.  11   • hydrocodone/acetaminophen (NORCO)  MG Tab Take 1 tablet by mouth every 4 hours as needed for breakthrough pain. The earliest date the pharmacy may fill the rx is 7/18/2016. 180 Tab 0   • carisoprodol (SOMA) 350 MG Tab Take 1 Tab by mouth every 8 hours as needed for Muscle Spasms. Fill at monthly intervals or greater. 90 Tab 0   • aspirin (ASA) 81 MG Chew Tab chewable tablet Take 81 mg by mouth every day.     • albuterol (VENTOLIN OR PROVENTIL) 108 (90 BASE) MCG/ACT AERS inhalation aerosol Inhale 2 Puffs by mouth every 6 hours as needed for Shortness of Breath. 8.5 g 3   • docusate sodium (COLACE) 100 MG CAPS Take 1 Cap by mouth 2 times a day. 60 Cap 5   • fluticasone (FLONASE)  "50 MCG/ACT nasal spray Spray 1 Spray in nose every day. 16 g 11   • Loratadine (CLARITIN) 10 MG CAPS Take 1 Cap by mouth every day. 30 Cap 11   • ferrous sulfate 325 (65 FE) MG tablet Take 1 Tab by mouth every day. 30 Tab 3   • enoxaparin (LOVENOX) 100 MG/ML Solution inj Inject 100 mg as instructed every day. (Patient not taking: Reported on 6/29/2017) 10 Syringe 1     No facility-administered encounter medications on file as of 6/29/2017.     ROS     Objective:   /60 mmHg  Pulse 82  Ht 1.753 m (5' 9.02\")  Wt 58.968 kg (130 lb)  BMI 19.19 kg/m2  SpO2 94%    Physical Exam   Neck: No JVD present.   Cardiovascular: Normal rate and regular rhythm.  Exam reveals no gallop.    No murmur heard.  Pulmonary/Chest: Effort normal. She has no rales.   Abdominal: Soft. There is no tenderness.   Musculoskeletal: She exhibits no edema.     Postural vital signs: Patient's blood pressure did not drop significantly. However, her heart rate increased from 60 supine to 100 standing.  Lab Results   Component Value Date/Time    CHOLESTEROL, 05/16/2016 09:25 AM    LDL 45 05/16/2016 09:25 AM    HDL 58 05/16/2016 09:25 AM    TRIGLYCERIDES 78 05/16/2016 09:25 AM       Lab Results   Component Value Date/Time    SODIUM 141 04/03/2017 08:26 AM    POTASSIUM 4.6 04/03/2017 08:26 AM    CHLORIDE 107 04/03/2017 08:26 AM    CO2 28 04/03/2017 08:26 AM    GLUCOSE 100* 04/03/2017 08:26 AM    BUN 12 04/03/2017 08:26 AM    CREATININE 0.83 04/03/2017 08:26 AM     Lab Results   Component Value Date/Time    ALKALINE PHOSPHATASE 71 04/03/2017 08:26 AM    AST(SGOT) 18 04/03/2017 08:26 AM    ALT(SGPT) 11 04/03/2017 08:26 AM    TOTAL BILIRUBIN 0.4 04/03/2017 08:26 AM      No results found for: BNPBTYPENAT       Assessment:     1. Coronary arteriosclerosis     2. Syncope, unspecified syncope type     3. Mixed hyperlipidemia     4. Chronic anticoagulation     5. Factor V deficiency (CMS-HCC): With history of DVT and pulmonary emboli   "       Medical Decision Making:  Today's Assessment / Status / Plan:     Ms. Perez continues to have difficulty with recurrent syncope. Her blood pressure is relatively low and she does have an orthostatic rise in her heart rate. At this time, we'll place her on Florinef 0.1 mg daily. She is also asked to increase her fluid intake. Given the current guidelines, we will change her from simvastatin to atorvastatin 40 mg daily. She will have lab work in about 6 weeks and follow-up in 3 months. If she continues to have episodes of syncope we may further increase Florinef therapy. She continues on chronic anticoagulation for her hypercoagulable state.

## 2017-06-29 NOTE — MR AVS SNAPSHOT
"        Phyllis Perez   2017 11:00 AM   Office Visit   MRN: [id number]    Department:  [facility]   Dept Phone:  [phone]    Description:  Female : 1960   Provider:  Uri Butts M.D.           Reason for Visit     Follow-Up           Allergies as of 2017     Allergen Noted Reactions    Bactrim [Sulfamethoxazole W-Trimethoprim] 2015       constipation    Morphine 2015       Severe HA    Other Misc 2015   Hives    cloraprep    Tape 2015       Blistering and then pop      You were diagnosed with     Coronary arteriosclerosis   [259735]       Syncope, unspecified syncope type   [9443405]       Mixed hyperlipidemia   [272.2.ICD-9-CM]       Chronic anticoagulation   [382236]       Factor V deficiency (CMS-HCC)   [710557]         Vital Signs     Blood Pressure Pulse Height Weight Body Mass Index Oxygen Saturation    100/60 mmHg 82 1.753 m (5' 9.02\") 58.968 kg (130 lb) 19.19 kg/m2 94%    Smoking Status                   Former Smoker           Basic Information     Date Of Birth Sex Race Ethnicity Preferred Language    1960 Female White Non- English      Problem List              ICD-10-CM Priority Class Noted - Resolved    Environmental allergies Z91.09   7/10/2015 - Present    Chronic pain G89.29   7/10/2015 - Present    Insomnia G47.00   7/10/2015 - Present    Slow transit constipation K59.01   7/10/2015 - Present    COPD (chronic obstructive pulmonary disease) (CMS-HCC) J44.9   7/10/2015 - Present    Mixed hyperlipidemia E78.2   7/10/2015 - Present    Migraine without status migrainosus, not intractable G43.909   7/10/2015 - Present    Factor V Leiden (CMS-HCC) D68.51   7/10/2015 - Present    Healthcare maintenance Z00.00   2015 - Present    Cervical spondylosis M47.812   2015 - Present    DDD (degenerative disc disease), cervical M50.30   2015 - Present    Failed back syndrome, lumbar M96.1   2015 - Present    Lumbosacral spondylosis " M47.817   7/14/2015 - Present    Left lumbar radiculopathy M54.16   7/14/2015 - Present    Osteoarthrosis, localized, primary, knee M17.10   7/14/2015 - Present    Localized osteoarthrosis, shoulder region M19.019   7/14/2015 - Present    Restless leg syndrome G25.81   8/4/2015 - Present    Coronary arteriosclerosis I25.10   8/24/2015 - Present    Old MI (myocardial infarction) I25.2   8/24/2015 - Present    Chronic anticoagulation Z79.01   8/24/2015 - Present    S/P insertion of IVC (inferior vena caval) filter Z95.828   8/24/2015 - Present    Syncope R55   10/27/2015 - Present    Pain medication agreement discussed Z71.89   6/9/2016 - Present    Congenital deficiency of other clotting factors D68.2   4/12/2017 - Present    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   6/29/2017 - Present      Health Maintenance        Date Due Completion Dates    IMM PNEUMOCOCCAL 19-64 (ADULT) MEDIUM RISK SERIES (1 of 1 - PPSV23) 8/3/1979 ---    MAMMOGRAM 8/3/2016 8/3/2015 (Postponed)    Override on 8/3/2015: Postponed    PAP SMEAR 8/3/2018 8/3/2015 (Postponed)    Override on 8/3/2015: Postponed    COLONOSCOPY 8/3/2025 8/3/2015 (Postponed)    Override on 8/3/2015: Postponed    IMM DTaP/Tdap/Td Vaccine (2 - Td) 12/27/2026 12/27/2016            Current Immunizations     Tdap Vaccine 12/27/2016      Below and/or attached are the medications your provider expects you to take. Review all of your home medications and newly ordered medications with your provider and/or pharmacist. Follow medication instructions as directed by your provider and/or pharmacist. Please keep your medication list with you and share with your provider. Update the information when medications are discontinued, doses are changed, or new medications (including over-the-counter products) are added; and carry medication information at all times in the event of emergency situations     Allergies:  BACTRIM - (reactions not documented)      MORPHINE - (reactions not documented)     OTHER MISC - Hives     TAPE - (reactions not documented)               Medications  Valid as of: June 29, 2017 - 11:22 AM    Generic Name Brand Name Tablet Size Instructions for use    Albuterol Sulfate (Aero Soln) albuterol 108 (90 BASE) MCG/ACT Inhale 2 Puffs by mouth every 6 hours as needed for Shortness of Breath.        Aspirin (Chew Tab) ASA 81 MG Take 81 mg by mouth every day.        Atorvastatin Calcium (Tab) LIPITOR 40 MG Take 1 Tab by mouth every day.        Carisoprodol (Tab) SOMA 350 MG Take 1 Tab by mouth every 8 hours as needed for Muscle Spasms. Fill at monthly intervals or greater.        Docusate Sodium (Cap) COLACE 100 MG Take 1 Cap by mouth 2 times a day.        FentaNYL (PATCH 72 HR) DURAGESIC 100 MCG/HR Apply 1 Patch to skin as directed every 72 hours.        Ferrous Sulfate (Tab) ferrous sulfate 325 (65 FE) MG Take 1 Tab by mouth every day.        Fludrocortisone Acetate (Tab) FLORINEF 0.1 MG Take 1 Tab by mouth every day.        Fluticasone Propionate (Suspension) FLONASE 50 MCG/ACT Spray 1 Spray in nose every day.        Fluticasone-Salmeterol (AEROSOL POWDER, BREATH ACTIVATED) ADVAIR DISKUS 250-50 MCG/DOSE Inhale 1 Puff by mouth every day.        Hydrocodone-Acetaminophen (Tab) NORCO  MG Take 1 tablet by mouth every 4 hours as needed for breakthrough pain. The earliest date the pharmacy may fill the rx is 7/18/2016.        Loratadine (Cap) Loratadine 10 MG Take 1 Cap by mouth every day.        Potassium Chloride (Tab CR) KLOR-CON 10 MEQ Take 1 Tab by mouth every day.        ROPINIRole HCl (Tab) REQUIP 4 MG Take 4 mg by mouth every evening.        Temazepam (Cap) RESTORIL 30 MG Take 1 Cap by mouth at bedtime as needed for Sleep. Fill at monthly intervals or greater.        Warfarin Sodium (Tab) COUMADIN 3 MG Take 3-4.5 mg by mouth every day. 4.5 mg on Mondays, Wednesdays , and Fridays . All other days 3 mg        .                 Medicines  prescribed today were sent to:     [id number] - [place], [place] - [address] [place] [address]    [address] [place] [address]    Phone: [phone] Fax: [phone]    Open 24 Hours?: No      Medication refill instructions:       If your prescription bottle indicates you have medication refills left, it is not necessary to call your provider’s office. Please contact your pharmacy and they will refill your medication.    If your prescription bottle indicates you do not have any refills left, you may request refills at any time through one of the following ways: The online [facility] system (except Urgent Care), by calling your provider’s office, or by asking your pharmacy to contact your provider’s office with a refill request. Medication refills are processed only during regular business hours and may not be available until the next business day. Your provider may request additional information or to have a follow-up visit with you prior to refilling your medication.   *Please Note: Medication refills are assigned a new Rx number when refilled electronically. Your pharmacy may indicate that no refills were authorized even though a new prescription for the same medication is available at the pharmacy. Please request the medicine by name with the pharmacy before contacting your provider for a refill.        Your To Do List     Future Labs/Procedures Complete By Expires    COMP METABOLIC PANEL  As directed 6/29/2018    LIPID PROFILE  As directed 6/29/2018      Other Notes About Your Plan     1. Anticoagulation clinic  2. Cardiology  3. Pain clinic  4. Ortho           MyChart Access Code: [email or web address]  Expires: 7/29/2017 11:22 AM    [employer]  A secure, online tool to manage your health information     [employer]’s Trendrt® is a secure, online tool that connects you to your personalized health information from the privacy of your home -- day or night - making it  very easy for you to manage your healthcare. Once the activation process is completed, you can even access your medical information using the Crestock gilmar, which is available for free in the Apple Gilmar store or Google Play store.     Crestock provides the following levels of access (as shown below):   My Chart Features   [facility] Doctor [facility]  Specialists [facility]  Urgent  Care Non-[facility]  Primary Care  Doctor   Email your healthcare team securely and privately 24/7 X X X    Manage appointments: schedule your next appointment; view details of past/upcoming appointments X      Request prescription refills. X      View recent personal medical records, including lab and immunizations X X X X   View health record, including health history, allergies, medications X X X X   Read reports about your outpatient visits, procedures, consult and ER notes X X X X   See your discharge summary, which is a recap of your hospital and/or ER visit that includes your diagnosis, lab results, and care plan. X X       How to register for Crestock:  1. Go to  [email or web address].  2. Click on the Sign Up Now box, which takes you to the New Member Sign Up page. You will need to provide the following information:  a. Enter your Crestock Access Code exactly as it appears at the top of this page. (You will not need to use this code after you’ve completed the sign-up process. If you do not sign up before the expiration date, you must request a new code.)   b. Enter your date of birth.   c. Enter your home email address.   d. Click Submit, and follow the next screen’s instructions.  3. Create a Crestock ID. This will be your Crestock login ID and cannot be changed, so think of one that is secure and easy to remember.  4. Create a Crestock password. You can change your password at any time.  5. Enter your Password Reset Question and Answer. This can be used at a later time if you forget your password.   6. Enter your e-mail address. This  allows you to receive e-mail notifications when new information is available in GIVINGtrax.  7. Click Sign Up. You can now view your health information.    For assistance activating your GIVINGtrax account, call [phone]

## 2017-07-12 ENCOUNTER — ANTICOAGULATION MONITORING (OUTPATIENT)
Dept: VASCULAR LAB | Facility: MEDICAL CENTER | Age: 57
End: 2017-07-12

## 2017-07-12 DIAGNOSIS — D68.51 FACTOR V LEIDEN (HCC): ICD-10-CM

## 2017-07-12 LAB — INR PPP: 3.5 (ref 2–3.5)

## 2017-07-12 NOTE — PROGRESS NOTES
Anticoagulation Summary as of 7/12/2017     INR goal 2.5-3.5   Selected INR 3.5 (7/12/2017)   Maintenance plan 4.5 mg (3 mg x 1.5) on Mon, Wed, Fri; 3 mg (3 mg x 1) all other days   Weekly total 25.5 mg   No change documented Deacon Novak PHARMD   Plan last modified Deacon Novak PHARMD (8/15/2016)   Next INR check 7/26/2017   Target end date Indefinite    Indications   Factor V Leiden (CMS-HCC) [D68.51]  Deep vein thrombosis [453.40] (Resolved) [I82.409]  Pulmonary embolism [415.19] (Resolved) [I26.99]         Anticoagulation Episode Summary     [facility]    Preferred lab     Send INR reminders to     Comments PATIENT SELF MONITORING      Anticoagulation Care Providers     Provider Role Specialty Phone number    DERICK Diehl Referring Family Medicine [phone]    Eliseo Eduardo M.D. Responsible Cardiology [phone]    Deacon Novak PHARMD Responsible          Anticoagulation Patient Findings    This patient is enrolled in the Patient Self Management Program. Follow up INR in two weeks.    Deacon Novak PHARMD

## 2017-07-18 NOTE — PROGRESS NOTES
Phyllis Perez a moderate lace score of 7 member, discharged from [facility] on 4/13 with a diagnoses of  other intervertebral disc degeneration lumbar region. [facility]’s patient advocate successfully engaged with Mrs. Perez several times after discharge home and ensured all her medications were picked up and also confirmed that she was compliant with her follow up appointments with her Surgeon Dr. Rayo on 4/25, DME O2 initial start date 2/11/17 and patient purchased C-collar prior to admission. Patient has one future appointment scheduled with Surgeon on 6/13.

## 2017-08-02 ENCOUNTER — PATIENT OUTREACH (OUTPATIENT)
Dept: HEALTH INFORMATION MANAGEMENT | Facility: OTHER | Age: 57
End: 2017-08-02

## 2017-08-02 NOTE — PROGRESS NOTES
Attempt #:1    WebIZ Checked & Epic Updated: no  HealthConnect Verified: no  Verify PCP: yes    Communication Preference Obtained: yes     Review Care Team: yes    Annual Wellness Visit Scheduling  1. Scheduling Status:Not Scheduled. Patient states they are not interested     Pt will schedule awv with non [facility] pcp        MyChart Activation: declined  MyChart Gilmar: no  Virtual Visits: no  Opt In to Text Messages: no

## 2017-08-04 ENCOUNTER — ANTICOAGULATION MONITORING (OUTPATIENT)
Dept: MEDICAL GROUP | Facility: PHYSICIAN GROUP | Age: 57
End: 2017-08-04

## 2017-08-04 DIAGNOSIS — D68.51 FACTOR V LEIDEN (HCC): ICD-10-CM

## 2017-08-04 NOTE — PROGRESS NOTES
Anticoagulation Summary as of 8/4/2017     INR goal 2.5-3.5   Selected INR No new INR was available at the time of this encounter.   Maintenance plan 4.5 mg (3 mg x 1.5) on Mon, Wed, Fri; 3 mg (3 mg x 1) all other days   Weekly total 25.5 mg   Plan last modified Deacon Novak, PHARMD (8/15/2016)   Next INR check 8/14/2017   Target end date Indefinite    Indications   Factor V Leiden (CMS-[facility]) [D68.51]  Deep vein thrombosis [453.40] (Resolved) [I82.409]  Pulmonary embolism [415.19] (Resolved) [I26.99]         Anticoagulation Episode Summary     [facility]    Preferred lab     Send INR reminders to     Comments PATIENT SELF MONITORING      Anticoagulation Care Providers     Provider Role Specialty Phone number    HERMINIA Diehl. Referring Family Medicine [phone]    Eliseo Eduardo M.D. Responsible Cardiology [phone]    Deacon Novak, PHARMD Responsible          Anticoagulation Patient Findings    She asked us to send in Lovenox Rx because she will be having a surgery on the 10th. She will need to hold warfarin for 5 days. See coagtrack for details     Felix Bailey, FLYD

## 2017-08-22 LAB — INR PPP: 2.9 (ref 2–3.5)

## 2017-08-29 LAB — INR PPP: 3.4 (ref 2–3.5)

## 2017-09-07 ENCOUNTER — HOSPITAL ENCOUNTER (OUTPATIENT)
Dept: LAB | Facility: MEDICAL CENTER | Age: 57
End: 2017-09-07
Attending: INTERNAL MEDICINE
Payer: MEDICARE

## 2017-09-07 DIAGNOSIS — E78.2 MIXED HYPERLIPIDEMIA: ICD-10-CM

## 2017-09-07 DIAGNOSIS — I25.10 CORONARY ARTERIOSCLEROSIS: ICD-10-CM

## 2017-09-07 LAB
ALBUMIN SERPL BCP-MCNC: 3.7 G/DL (ref 3.2–4.9)
ALBUMIN/GLOB SERPL: 1.2 G/DL
ALP SERPL-CCNC: 72 U/L (ref 30–99)
ALT SERPL-CCNC: 7 U/L (ref 2–50)
ANION GAP SERPL CALC-SCNC: 9 MMOL/L (ref 0–11.9)
AST SERPL-CCNC: 18 U/L (ref 12–45)
BILIRUB SERPL-MCNC: 0.5 MG/DL (ref 0.1–1.5)
BUN SERPL-MCNC: 9 MG/DL (ref 8–22)
CALCIUM SERPL-MCNC: 9.3 MG/DL (ref 8.5–10.5)
CHLORIDE SERPL-SCNC: 100 MMOL/L (ref 96–112)
CHOLEST SERPL-MCNC: 183 MG/DL (ref 100–199)
CO2 SERPL-SCNC: 26 MMOL/L (ref 20–33)
CREAT SERPL-MCNC: 0.71 MG/DL (ref 0.5–1.4)
FASTING STATUS PATIENT QL REPORTED: NORMAL
GFR SERPL CREATININE-BSD FRML MDRD: >60 ML/MIN/1.73 M 2
GLOBULIN SER CALC-MCNC: 3.1 G/DL (ref 1.9–3.5)
GLUCOSE SERPL-MCNC: 83 MG/DL (ref 65–99)
HDLC SERPL-MCNC: 53 MG/DL
LDLC SERPL CALC-MCNC: 102 MG/DL
POTASSIUM SERPL-SCNC: 4.6 MMOL/L (ref 3.6–5.5)
PROT SERPL-MCNC: 6.8 G/DL (ref 6–8.2)
SODIUM SERPL-SCNC: 135 MMOL/L (ref 135–145)
TRIGL SERPL-MCNC: 141 MG/DL (ref 0–149)

## 2017-09-07 PROCEDURE — 80061 LIPID PANEL: CPT

## 2017-09-07 PROCEDURE — 80053 COMPREHEN METABOLIC PANEL: CPT

## 2017-09-07 PROCEDURE — 36415 COLL VENOUS BLD VENIPUNCTURE: CPT

## 2017-09-08 ENCOUNTER — ANTICOAGULATION MONITORING (OUTPATIENT)
Dept: VASCULAR LAB | Facility: MEDICAL CENTER | Age: 57
End: 2017-09-08

## 2017-09-08 DIAGNOSIS — D68.51 FACTOR V LEIDEN (HCC): ICD-10-CM

## 2017-09-08 NOTE — PROGRESS NOTES
Anticoagulation Summary  As of 9/8/2017    INR goal:   2.5-3.5   TTR:   34.5 % (2 y)   Today's INR:   3.4 (8/29/2017)   Maintenance plan:   4.5 mg (3 mg x 1.5) on Mon, Wed, Fri; 3 mg (3 mg x 1) all other days   Weekly total:   25.5 mg   No change documented:   Deacon Novak PharmD   Plan last modified:   Deacon Novak PharmD (8/15/2016)   Next INR check:   9/12/2017   Target end date:   Indefinite    Indications    Factor V Leiden (CMS-[facility]) [D68.51]  Deep vein thrombosis [453.40] (Resolved) [I82.409]  Pulmonary embolism [415.19] (Resolved) [I26.99]             Anticoagulation Episode Summary     [facility]   [facility]    Preferred lab:       Send INR reminders to:       Comments:   PATIENT SELF MONITORING      Anticoagulation Care Providers     Provider Role Specialty Phone number    DERICK Diehl Referring Family Medicine [phone]    Eliseo Eduardo M.D. Responsible Cardiology [phone]    Deacon Novak, Sandhya Responsible          Anticoagulation Patient Findings    This patient is enrolled in the Patient Self Management Program. Follow up INR in two weeks.      Deacon Novak PharmD

## 2017-10-04 ENCOUNTER — OFFICE VISIT (OUTPATIENT)
Dept: CARDIOLOGY | Facility: MEDICAL CENTER | Age: 57
End: 2017-10-04
Payer: MEDICARE

## 2017-10-04 VITALS
HEART RATE: 80 BPM | WEIGHT: 125 LBS | SYSTOLIC BLOOD PRESSURE: 80 MMHG | HEIGHT: 67 IN | OXYGEN SATURATION: 96 % | BODY MASS INDEX: 19.62 KG/M2 | DIASTOLIC BLOOD PRESSURE: 50 MMHG

## 2017-10-04 DIAGNOSIS — I25.10 CORONARY ARTERIOSCLEROSIS: ICD-10-CM

## 2017-10-04 DIAGNOSIS — D68.2 FACTOR V DEFICIENCY (HCC): ICD-10-CM

## 2017-10-04 DIAGNOSIS — E78.2 MIXED HYPERLIPIDEMIA: ICD-10-CM

## 2017-10-04 DIAGNOSIS — R55 SYNCOPE, UNSPECIFIED SYNCOPE TYPE: ICD-10-CM

## 2017-10-04 PROCEDURE — 93000 ELECTROCARDIOGRAM COMPLETE: CPT | Performed by: INTERNAL MEDICINE

## 2017-10-04 PROCEDURE — 99214 OFFICE O/P EST MOD 30 MIN: CPT | Performed by: INTERNAL MEDICINE

## 2017-10-04 NOTE — PROGRESS NOTES
Subjective:   Phyllis Perez is a 57 y.o. female who presents today for follow-up with a history of coronary artery disease, hypercoagulable state, dyslipidemia and syncope. She also has a permanent pacemaker. She continues to have syncope couple of times a month. She feels is secondary to a drop in her oxygen saturation from her severe underlying lung disease.    At the time of her last visit, who placed her on Florinef because of orthostatic hypotension. She continues to have some difficulty with hypotension and lightheadedness but has not had any recurrent syncope.    She has dyspnea on exertion about 20 feet or less. She's had no PND or orthopnea but is on oxygen therapy at night. She does have exertional hypoxemia. She's noted no edema. She has frequent palpitations. She has occasional chest discomfort which may last about a minute.    She has chronic diarrhea with any food intake. She is being evaluated by her PCP and may need GI referral.    Past Medical History:   Diagnosis Date   • Anemia    • Anticoagulant prescribed    • Asthma    • CAD (coronary artery disease)     Old MI/POBA   • Chronic hypoxemic respiratory failure (CMS-HCC)    • Emphysema (subcutaneous) (surgical) resulting from a procedure    • Factor V Leiden (CMS-HCC)    • History of blood clots     DVT with PE   • Hot flashes    • Hyperlipemia, mixed    • Old MI (myocardial infarction)     Treated with POBA; Cath without obstructive DZ 2009   • Ovarian cancer (CMS-HCC)    • Pacemaker    • Sleep apnea     CPAP   • SSS (sick sinus syndrome) (CMS-HCC)     Tx with pacer   • Uterine cancer (CMS-HCC)      Past Surgical History:   Procedure Laterality Date   • CERVICAL FUSION POSTERIOR  4/12/2017    Procedure: CERVICAL FUSION POSTERIOR- WITH INSTRUMENTATION C4-5, C5-6;  Surgeon: Alejandro Rayo M.D.;  Location: SURGERY [facility];  Service:    • PACEMAKER INSERTION  2010    bradycardia   • ANGIOPLASTY      ?2004   • CHOLECYSTECTOMY     • ELBOW  EXPLORATION      cts   • HYSTERECTOMY LAPAROSCOPY     • KNEE REPLACEMENT, TOTAL     • KNEE REVISION TOTAL     • LUMBAR EXPLORATION      buck   • NASAL RECONSTRUCTION     • OOPHORECTOMY     • OTHER SURGICAL PROCEDURE      IVC filter placement   • SHOULDER ARTHROPLASTY TOTAL REVERSED       Family History   Problem Relation Age of Onset   • Cancer Mother    • Cancer Father    • Cancer Maternal Aunt    • Cancer Maternal Uncle      History   Smoking Status   • Former Smoker   • Years: 15.00   • Quit date: 7/10/2001   Smokeless Tobacco   • Never Used     Allergies   Allergen Reactions   • Bactrim [Sulfamethoxazole W-Trimethoprim]      constipation   • Morphine      Severe HA   • Other Misc Hives     cloraprep   • Tape      Blistering and then pop     Outpatient Encounter Prescriptions as of 10/4/2017   Medication Sig Dispense Refill   • enoxaparin (LOVENOX) 80 MG/0.8ML Solution inj Inject 80 mg as instructed every day. 10 Syringe 1   • atorvastatin (LIPITOR) 40 MG Tab Take 1 Tab by mouth every day. 30 Tab 11   • warfarin (COUMADIN) 3 MG Tab Take 3-4.5 mg by mouth every day. 4.5 mg on Mondays, Wednesdays , and Fridays . All other days 3 mg     • potassium chloride ER (KLOR-CON) 10 MEQ tablet Take 1 Tab by mouth every day.  5   • ropinirole (REQUIP) 4 MG tablet Take 4 mg by mouth every evening.     • temazepam (RESTORIL) 30 MG capsule Take 1 Cap by mouth at bedtime as needed for Sleep. Fill at monthly intervals or greater. 30 Cap 0   • fentanyl (DURAGESIC) 100 MCG/HR PATCH 72 HR Apply 1 Patch to skin as directed every 72 hours.  0   • ADVAIR DISKUS 250-50 MCG/DOSE AEROSOL POWDER, BREATH ACTIVATED Inhale 1 Puff by mouth every day.  11   • hydrocodone/acetaminophen (NORCO)  MG Tab Take 1 tablet by mouth every 4 hours as needed for breakthrough pain. The earliest date the pharmacy may fill the rx is 7/18/2016. 180 Tab 0   • carisoprodol (SOMA) 350 MG Tab Take 1 Tab by mouth every 8 hours as needed for Muscle Spasms. Fill  "at monthly intervals or greater. 90 Tab 0   • aspirin (ASA) 81 MG Chew Tab chewable tablet Take 81 mg by mouth every day.     • albuterol (VENTOLIN OR PROVENTIL) 108 (90 BASE) MCG/ACT AERS inhalation aerosol Inhale 2 Puffs by mouth every 6 hours as needed for Shortness of Breath. 8.5 g 3   • fluticasone (FLONASE) 50 MCG/ACT nasal spray Spray 1 Spray in nose every day. 16 g 11   • Loratadine (CLARITIN) 10 MG CAPS Take 1 Cap by mouth every day. 30 Cap 11   • fludrocortisone (FLORINEF) 0.1 MG Tab Take 1 Tab by mouth every day. 30 Tab 11   • docusate sodium (COLACE) 100 MG CAPS Take 1 Cap by mouth 2 times a day. 60 Cap 5   • ferrous sulfate 325 (65 FE) MG tablet Take 1 Tab by mouth every day. 30 Tab 3     No facility-administered encounter medications on file as of 10/4/2017.      ROS       Objective:   BP (!) 80/50   Pulse 80   Ht 1.702 m (5' 7\")   Wt 56.7 kg (125 lb)   BMI 19.58 kg/m²     Physical Exam   Neck: No JVD present.   Cardiovascular: Normal rate and regular rhythm.  Exam reveals no gallop.    No murmur heard.  Pulmonary/Chest: Effort normal. She has no rales.   Abdominal: Soft. There is no tenderness.   Musculoskeletal: She exhibits no edema.     Postural vital signs: Patient's blood pressure did not drop significantly. However, her heart rate increased from 60 supine to 100 standing.    Lab Results   Component Value Date/Time    CHOLSTRLTOT 183 09/07/2017 09:59 AM     (H) 09/07/2017 09:59 AM    HDL 53 09/07/2017 09:59 AM    TRIGLYCERIDE 141 09/07/2017 09:59 AM       Lab Results   Component Value Date/Time    SODIUM 135 09/07/2017 09:59 AM    POTASSIUM 4.6 09/07/2017 09:59 AM    CHLORIDE 100 09/07/2017 09:59 AM    CO2 26 09/07/2017 09:59 AM    GLUCOSE 83 09/07/2017 09:59 AM    BUN 9 09/07/2017 09:59 AM    CREATININE 0.71 09/07/2017 09:59 AM     Lab Results   Component Value Date/Time    ALKPHOSPHAT 72 09/07/2017 09:59 AM    ASTSGOT 18 09/07/2017 09:59 AM    ALTSGPT 7 09/07/2017 09:59 AM    " TBILIRUBIN 0.5 09/07/2017 09:59 AM      No results found for: BNPBTYPENAT     EKG: This shows a normal sinus rhythm with nonspecific T-wave inversion diffusely.    Assessment:     1. Coronary arteriosclerosis     2. Mixed hyperlipidemia     3. Syncope, unspecified syncope type     4. 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 Perez Is having difficulty with diarrhea anytime she eats. She has undergone some lab work by her PCP and may need GI referral. She was advised to try taking some Imodium to see if this would help in the interim. We need her to increase her salt and fluid intake and hopefully she will gain some weight. She was quite tachycardiac with just getting up on the exam table which improved when her EKG was obtained. Her EKG is abnormal but her chest discomfort is atypical. Once her blood pressure improves and may consider further evaluation. She does have chronic chest discomfort and her last cardiac catheterization was in 2010. She will follow-up in about a month. She was advised to wear her oxygen 24/7.

## 2017-10-04 NOTE — LETTER
[facility]   [address], [facility] [address]  [place] [address]  Phone: [phone]  Fax: [phone]              Phyllis Perez  1960    Encounter Date: 10/4/2017    Uri Btuts M.D.          PROGRESS NOTE:  Subjective:   Phyllis Perez is a 57 y.o. female who presents today for follow-up with a history of coronary artery disease, hypercoagulable state, dyslipidemia and syncope. She also has a permanent pacemaker. She continues to have syncope couple of times a month. She feels is secondary to a drop in her oxygen saturation from her severe underlying lung disease.    At the time of her last visit, who placed her on Florinef because of orthostatic hypotension. She continues to have some difficulty with hypotension and lightheadedness but has not had any recurrent syncope.    She has dyspnea on exertion about 20 feet or less. She's had no PND or orthopnea but is on oxygen therapy at night. She does have exertional hypoxemia. She's noted no edema. She has frequent palpitations. She has occasional chest discomfort which may last about a minute.    She has chronic diarrhea with any food intake. She is being evaluated by her PCP and may need GI referral.    Past Medical History:   Diagnosis Date   • Anemia    • Anticoagulant prescribed    • Asthma    • CAD (coronary artery disease)     Old MI/POBA   • Chronic hypoxemic respiratory failure (CMS-HCC)    • Emphysema (subcutaneous) (surgical) resulting from a procedure    • Factor V Leiden (CMS-HCC)    • History of blood clots     DVT with PE   • Hot flashes    • Hyperlipemia, mixed    • Old MI (myocardial infarction)     Treated with POBA; Cath without obstructive DZ 2009   • Ovarian cancer (CMS-HCC)    • Pacemaker    • Sleep apnea     CPAP   • SSS (sick sinus syndrome) (CMS-HCC)     Tx with pacer   • Uterine cancer (CMS-HCC)      Past Surgical History:   Procedure Laterality Date   • CERVICAL FUSION POSTERIOR  4/12/2017       Procedure: CERVICAL FUSION POSTERIOR- WITH INSTRUMENTATION C4-5, C5-6;  Surgeon: Alejandro Rayo M.D.;  Location: SURGERY [facility];  Service:    • PACEMAKER INSERTION  2010    bradycardia   • ANGIOPLASTY      ?2004   • CHOLECYSTECTOMY     • ELBOW EXPLORATION      cts   • HYSTERECTOMY LAPAROSCOPY     • KNEE REPLACEMENT, TOTAL     • KNEE REVISION TOTAL     • LUMBAR EXPLORATION      buck   • NASAL RECONSTRUCTION     • OOPHORECTOMY     • OTHER SURGICAL PROCEDURE      IVC filter placement   • SHOULDER ARTHROPLASTY TOTAL REVERSED       Family History   Problem Relation Age of Onset   • Cancer Mother    • Cancer Father    • Cancer Maternal Aunt    • Cancer Maternal Uncle      History   Smoking Status   • Former Smoker   • Years: 15.00   • Quit date: 7/10/2001   Smokeless Tobacco   • Never Used     Allergies   Allergen Reactions   • Bactrim [Sulfamethoxazole W-Trimethoprim]      constipation   • Morphine      Severe HA   • Other Misc Hives     cloraprep   • Tape      Blistering and then pop     Outpatient Encounter Prescriptions as of 10/4/2017   Medication Sig Dispense Refill   • enoxaparin (LOVENOX) 80 MG/0.8ML Solution inj Inject 80 mg as instructed every day. 10 Syringe 1   • atorvastatin (LIPITOR) 40 MG Tab Take 1 Tab by mouth every day. 30 Tab 11   • warfarin (COUMADIN) 3 MG Tab Take 3-4.5 mg by mouth every day. 4.5 mg on Mondays, Wednesdays , and Fridays . All other days 3 mg     • potassium chloride ER (KLOR-CON) 10 MEQ tablet Take 1 Tab by mouth every day.  5   • ropinirole (REQUIP) 4 MG tablet Take 4 mg by mouth every evening.     • temazepam (RESTORIL) 30 MG capsule Take 1 Cap by mouth at bedtime as needed for Sleep. Fill at monthly intervals or greater. 30 Cap 0   • fentanyl (DURAGESIC) 100 MCG/HR PATCH 72 HR Apply 1 Patch to skin as directed every 72 hours.  0   • ADVAIR DISKUS 250-50 MCG/DOSE AEROSOL POWDER, BREATH ACTIVATED Inhale 1 Puff by mouth every day.  11   • hydrocodone/acetaminophen (NORCO)  " MG Tab Take 1 tablet by mouth every 4 hours as needed for breakthrough pain. The earliest date the pharmacy may fill the rx is 7/18/2016. 180 Tab 0   • carisoprodol (SOMA) 350 MG Tab Take 1 Tab by mouth every 8 hours as needed for Muscle Spasms. Fill at monthly intervals or greater. 90 Tab 0   • aspirin (ASA) 81 MG Chew Tab chewable tablet Take 81 mg by mouth every day.     • albuterol (VENTOLIN OR PROVENTIL) 108 (90 BASE) MCG/ACT AERS inhalation aerosol Inhale 2 Puffs by mouth every 6 hours as needed for Shortness of Breath. 8.5 g 3   • fluticasone (FLONASE) 50 MCG/ACT nasal spray Spray 1 Spray in nose every day. 16 g 11   • Loratadine (CLARITIN) 10 MG CAPS Take 1 Cap by mouth every day. 30 Cap 11   • fludrocortisone (FLORINEF) 0.1 MG Tab Take 1 Tab by mouth every day. 30 Tab 11   • docusate sodium (COLACE) 100 MG CAPS Take 1 Cap by mouth 2 times a day. 60 Cap 5   • ferrous sulfate 325 (65 FE) MG tablet Take 1 Tab by mouth every day. 30 Tab 3     No facility-administered encounter medications on file as of 10/4/2017.      ROS       Objective:   BP (!) 80/50   Pulse 80   Ht 1.702 m (5' 7\")   Wt 56.7 kg (125 lb)   BMI 19.58 kg/m²      Physical Exam   Neck: No JVD present.   Cardiovascular: Normal rate and regular rhythm.  Exam reveals no gallop.    No murmur heard.  Pulmonary/Chest: Effort normal. She has no rales.   Abdominal: Soft. There is no tenderness.   Musculoskeletal: She exhibits no edema.     Postural vital signs: Patient's blood pressure did not drop significantly. However, her heart rate increased from 60 supine to 100 standing.    Lab Results   Component Value Date/Time    CHOLSTRLTOT 183 09/07/2017 09:59 AM     (H) 09/07/2017 09:59 AM    HDL 53 09/07/2017 09:59 AM    TRIGLYCERIDE 141 09/07/2017 09:59 AM       Lab Results   Component Value Date/Time    SODIUM 135 09/07/2017 09:59 AM    POTASSIUM 4.6 09/07/2017 09:59 AM    CHLORIDE 100 09/07/2017 09:59 AM    CO2 26 09/07/2017 09:59 AM    " GLUCOSE 83 09/07/2017 09:59 AM    BUN 9 09/07/2017 09:59 AM    CREATININE 0.71 09/07/2017 09:59 AM     Lab Results   Component Value Date/Time    ALKPHOSPHAT 72 09/07/2017 09:59 AM    ASTSGOT 18 09/07/2017 09:59 AM    ALTSGPT 7 09/07/2017 09:59 AM    TBILIRUBIN 0.5 09/07/2017 09:59 AM      No results found for: BNPBTYPENAT     EKG: This shows a normal sinus rhythm with nonspecific T-wave inversion diffusely.    Assessment:     1. Coronary arteriosclerosis     2. Mixed hyperlipidemia     3. Syncope, unspecified syncope type     4. 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 Perez Is having difficulty with diarrhea anytime she eats. She has undergone some lab work by her PCP and may need GI referral. She was advised to try taking some Imodium to see if this would help in the interim. We need her to increase her salt and fluid intake and hopefully she will gain some weight. She was quite tachycardiac with just getting up on the exam table which improved when her EKG was obtained. Her EKG is abnormal but her chest discomfort is atypical. Once her blood pressure improves and may consider further evaluation. She does have chronic chest discomfort and her last cardiac catheterization was in 2010. She will follow-up in about a month. She was advised to wear her oxygen 24/7.      Polina Zamora M.D.  [address] [place] [id number]  VIA Facsimile: [phone]

## 2017-10-05 LAB — EKG IMPRESSION: NORMAL

## 2017-11-06 ENCOUNTER — TELEPHONE (OUTPATIENT)
Dept: VASCULAR LAB | Facility: MEDICAL CENTER | Age: 57
End: 2017-11-06

## 2017-11-06 NOTE — TELEPHONE ENCOUNTER
11/6/2017    Overdue for INR. Called patient and reminded her to test with POC INR monitor.    Deacon Novak, PharmD

## 2017-11-07 DIAGNOSIS — D68.2 FACTOR V DEFICIENCY (HCC): ICD-10-CM

## 2017-11-07 RX ORDER — WARFARIN SODIUM 3 MG/1
TABLET ORAL
Qty: 45 TAB | Refills: 0 | Status: SHIPPED | OUTPATIENT
Start: 2017-11-07 | End: 2018-03-29 | Stop reason: SDUPTHER

## 2017-11-17 ENCOUNTER — TELEPHONE (OUTPATIENT)
Dept: VASCULAR LAB | Facility: MEDICAL CENTER | Age: 57
End: 2017-11-17

## 2017-11-17 NOTE — TELEPHONE ENCOUNTER
2017    Patient states her test strips  and that a new lot arrived today. States she will test her INR over the weekend.    Deacon Novak, PharmD

## 2017-11-21 ENCOUNTER — ANTICOAGULATION MONITORING (OUTPATIENT)
Dept: VASCULAR LAB | Facility: MEDICAL CENTER | Age: 57
End: 2017-11-21

## 2017-11-21 DIAGNOSIS — D68.51 FACTOR V LEIDEN (HCC): ICD-10-CM

## 2017-11-21 LAB — INR PPP: 3.1 (ref 2–3.5)

## 2017-11-21 NOTE — PROGRESS NOTES
Anticoagulation Summary  As of 11/21/2017    INR goal:   2.5-3.5   TTR:   41.2 % (2.2 y)   Today's INR:   3.1   Maintenance plan:   4.5 mg (3 mg x 1.5) on Mon, Wed, Fri; 3 mg (3 mg x 1) all other days   Weekly total:   25.5 mg   No change documented:   Deacon Novak PharmD   Plan last modified:   Deacon Novak PharmD (8/15/2016)   Next INR check:   12/5/2017   Target end date:   Indefinite    Indications    Factor V Leiden (CMS-[facility]) [D68.51]  Deep vein thrombosis [453.40] (Resolved) [I82.409]  Pulmonary embolism [415.19] (Resolved) [I26.99]             Anticoagulation Episode Summary     INR check location:   [facility]    Preferred lab:       Send INR reminders to:       Comments:   PATIENT SELF MONITORING      Anticoagulation Care Providers     Provider Role Specialty Phone number    DERICK Diehl Referring Family Medicine [phone]    Eliseo Eduardo M.D. Responsible Cardiology [phone]    Deacon Novak, Sandhya Responsible          This patient is enrolled in the Patient Self Management Program    Deacon Novak PharmD

## 2017-12-07 ENCOUNTER — NON-PROVIDER VISIT (OUTPATIENT)
Dept: CARDIOLOGY | Facility: MEDICAL CENTER | Age: 57
End: 2017-12-07
Payer: MEDICARE

## 2017-12-07 DIAGNOSIS — I49.5 SICK SINUS SYNDROME (HCC): ICD-10-CM

## 2017-12-07 DIAGNOSIS — Z95.0 CARDIAC PACEMAKER IN SITU: ICD-10-CM

## 2017-12-07 PROCEDURE — 93280 PM DEVICE PROGR EVAL DUAL: CPT | Performed by: INTERNAL MEDICINE

## 2017-12-12 ENCOUNTER — OFFICE VISIT (OUTPATIENT)
Dept: CARDIOLOGY | Facility: MEDICAL CENTER | Age: 57
End: 2017-12-12
Payer: MEDICARE

## 2017-12-12 VITALS
OXYGEN SATURATION: 94 % | BODY MASS INDEX: 20.72 KG/M2 | HEART RATE: 86 BPM | DIASTOLIC BLOOD PRESSURE: 72 MMHG | WEIGHT: 132 LBS | HEIGHT: 67 IN | SYSTOLIC BLOOD PRESSURE: 102 MMHG

## 2017-12-12 DIAGNOSIS — E78.2 MIXED HYPERLIPIDEMIA: ICD-10-CM

## 2017-12-12 DIAGNOSIS — D68.51 FACTOR V LEIDEN (HCC): ICD-10-CM

## 2017-12-12 DIAGNOSIS — Z79.01 CHRONIC ANTICOAGULATION: ICD-10-CM

## 2017-12-12 DIAGNOSIS — I25.10 CORONARY ARTERIOSCLEROSIS: ICD-10-CM

## 2017-12-12 DIAGNOSIS — R00.2 PALPITATIONS: ICD-10-CM

## 2017-12-12 DIAGNOSIS — R55 SYNCOPE, UNSPECIFIED SYNCOPE TYPE: ICD-10-CM

## 2017-12-12 PROCEDURE — 99214 OFFICE O/P EST MOD 30 MIN: CPT | Performed by: INTERNAL MEDICINE

## 2017-12-12 RX ORDER — EZETIMIBE 10 MG/1
10 TABLET ORAL DAILY
Qty: 30 TAB | Refills: 11 | Status: SHIPPED | OUTPATIENT
Start: 2017-12-12 | End: 2018-11-26 | Stop reason: SDUPTHER

## 2017-12-12 NOTE — PROGRESS NOTES
Subjective:   Phyllis Perez is a 57 y.o. female who presents today for follow-up with a history of coronary artery disease, hypercoagulable state, dyslipidemia and syncope. She also has a permanent pacemaker.       She's had significant difficulty with orthostatic lightheadedness. We did place her on Florinef and she is increasing her salt and fluid intake. She continues to have some difficulty with orthostatic lightheadedness but no recurrent syncope. However, she has noted an intermittent hard heart rate at rest. With this, she will also become lightheaded. This is occurring several times a week and lasts up to about 5 minutes. She's only noticed it when she is sitting and noticed no aggravating or alleviating factors.    She has dyspnea on exertion about 20 feet or less. She's had no PND or orthopnea but is on oxygen therapy at night. She does have exertional hypoxemia. She's noted no edema. She has some sharp stabbing chest pain in her chest on a daily basis. This is a localized discomfort which may be at different places on her chest wall. It may last up to about a minute and be recurrent.    She has chronic diarrhea which occurs a couple of times a week. This is somewhat of an improvement, because, it was occurring on a daily basis.     Past Medical History:   Diagnosis Date   • Anemia    • Anticoagulant prescribed    • Asthma    • CAD (coronary artery disease)     Old MI/POBA   • Chronic hypoxemic respiratory failure (CMS-HCC)    • Emphysema (subcutaneous) (surgical) resulting from a procedure    • Factor V Leiden (CMS-HCC)    • History of blood clots     DVT with PE   • Hot flashes    • Hyperlipemia, mixed    • Old MI (myocardial infarction)     Treated with POBA; Cath without obstructive DZ 2009   • Ovarian cancer (CMS-[facility])    • Pacemaker    • Sleep apnea     CPAP   • SSS (sick sinus syndrome) (CMS-HCC)     Tx with pacer   • Uterine cancer (CMS-[facility])      Past Surgical History:   Procedure Laterality Date    • CERVICAL FUSION POSTERIOR  4/12/2017    Procedure: CERVICAL FUSION POSTERIOR- WITH INSTRUMENTATION C4-5, C5-6;  Surgeon: Alejandro Rayo M.D.;  Location: SURGERY [facility];  Service:    • PACEMAKER INSERTION  2010    bradycardia   • ANGIOPLASTY      ?2004   • CHOLECYSTECTOMY     • ELBOW EXPLORATION      cts   • HYSTERECTOMY LAPAROSCOPY     • KNEE REPLACEMENT, TOTAL     • KNEE REVISION TOTAL     • LUMBAR EXPLORATION      buck   • NASAL RECONSTRUCTION     • OOPHORECTOMY     • OTHER SURGICAL PROCEDURE      IVC filter placement   • SHOULDER ARTHROPLASTY TOTAL REVERSED       Family History   Problem Relation Age of Onset   • Cancer Mother    • Cancer Father    • Cancer Maternal Aunt    • Cancer Maternal Uncle      History   Smoking Status   • Former Smoker   • Years: 15.00   • Quit date: 7/10/2001   Smokeless Tobacco   • Never Used     Allergies   Allergen Reactions   • Bactrim [Sulfamethoxazole W-Trimethoprim]      constipation   • Morphine      Severe HA   • Other Misc Hives     cloraprep   • Tape      Blistering and then pop     Outpatient Encounter Prescriptions as of 12/12/2017   Medication Sig Dispense Refill   • warfarin (COUMADIN) 3 MG Tab TAKE 1 TO 1 1/2 TABLETS BY MOUTH DAILY AS DIRECTED COUMADIN CLINIC 45 Tab 0   • atorvastatin (LIPITOR) 40 MG Tab Take 1 Tab by mouth every day. 30 Tab 11   • fludrocortisone (FLORINEF) 0.1 MG Tab Take 1 Tab by mouth every day. 30 Tab 11   • potassium chloride ER (KLOR-CON) 10 MEQ tablet Take 1 Tab by mouth every day.  5   • ropinirole (REQUIP) 4 MG tablet Take 4 mg by mouth every evening.     • temazepam (RESTORIL) 30 MG capsule Take 1 Cap by mouth at bedtime as needed for Sleep. Fill at monthly intervals or greater. 30 Cap 0   • fentanyl (DURAGESIC) 100 MCG/HR PATCH 72 HR Apply 1 Patch to skin as directed every 72 hours.  0   • ADVAIR DISKUS 250-50 MCG/DOSE AEROSOL POWDER, BREATH ACTIVATED Inhale 1 Puff by mouth every day.  11   • hydrocodone/acetaminophen (NORCO)  " MG Tab Take 1 tablet by mouth every 4 hours as needed for breakthrough pain. The earliest date the pharmacy may fill the rx is 7/18/2016. 180 Tab 0   • carisoprodol (SOMA) 350 MG Tab Take 1 Tab by mouth every 8 hours as needed for Muscle Spasms. Fill at monthly intervals or greater. 90 Tab 0   • aspirin (ASA) 81 MG Chew Tab chewable tablet Take 81 mg by mouth every day.     • albuterol (VENTOLIN OR PROVENTIL) 108 (90 BASE) MCG/ACT AERS inhalation aerosol Inhale 2 Puffs by mouth every 6 hours as needed for Shortness of Breath. 8.5 g 3   • docusate sodium (COLACE) 100 MG CAPS Take 1 Cap by mouth 2 times a day. 60 Cap 5   • fluticasone (FLONASE) 50 MCG/ACT nasal spray Spray 1 Spray in nose every day. 16 g 11   • Loratadine (CLARITIN) 10 MG CAPS Take 1 Cap by mouth every day. 30 Cap 11   • ferrous sulfate 325 (65 FE) MG tablet Take 1 Tab by mouth every day. 30 Tab 3   • enoxaparin (LOVENOX) 80 MG/0.8ML Solution inj Inject 80 mg as instructed every day. 10 Syringe 1     No facility-administered encounter medications on file as of 12/12/2017.      ROS       Objective:   /72   Pulse 86   Ht 1.702 m (5' 7\")   Wt 59.9 kg (132 lb)   SpO2 94%   BMI 20.67 kg/m²     Physical Exam   Neck: No JVD present.   Cardiovascular: Normal rate and regular rhythm.  Exam reveals no gallop.    No murmur heard.  Pulmonary/Chest: Effort normal. She has no rales.   Abdominal: Soft. There is no tenderness.   Musculoskeletal: She exhibits no edema.     Postural vital signs: Patient's blood pressure did not drop significantly. However, her heart rate increased from 60 supine to 100 standing.    Lab Results   Component Value Date/Time    CHOLSTRLTOT 183 09/07/2017 09:59 AM     (H) 09/07/2017 09:59 AM    HDL 53 09/07/2017 09:59 AM    TRIGLYCERIDE 141 09/07/2017 09:59 AM       Lab Results   Component Value Date/Time    SODIUM 135 09/07/2017 09:59 AM    POTASSIUM 4.6 09/07/2017 09:59 AM    CHLORIDE 100 09/07/2017 09:59 AM    " CO2 26 09/07/2017 09:59 AM    GLUCOSE 83 09/07/2017 09:59 AM    BUN 9 09/07/2017 09:59 AM    CREATININE 0.71 09/07/2017 09:59 AM     Lab Results   Component Value Date/Time    ALKPHOSPHAT 72 09/07/2017 09:59 AM    ASTSGOT 18 09/07/2017 09:59 AM    ALTSGPT 7 09/07/2017 09:59 AM    TBILIRUBIN 0.5 09/07/2017 09:59 AM      No results found for: BNPBTYPENAT     EKG: This shows a normal sinus rhythm with nonspecific T-wave inversion diffusely.    Assessment:     1. Coronary arteriosclerosis     2. Mixed hyperlipidemia     3. Factor V Leiden (CMS-HCC)     4. Chronic anticoagulation     5. Syncope, unspecified syncope type         Medical Decision Making:  Today's Assessment / Status / Plan:     Ms. Perez Continues to have some difficulty with orthostatic lightheadedness. This has improved somewhat but she has also noticed some palpitations. She is advised to use compression stockings and we will obtain an event monitor. Her lipid status is not under good control and we will add ezetimibe 10 mg daily. She will follow-up in about 3 months with repeat lab work.

## 2017-12-12 NOTE — LETTER
[facility]   [address] [facility] [address] [facility] [address]  [place] [address]  Phone: [phone]  Fax: [phone]              Phyllis Perez  1960    Encounter Date: 12/12/2017    Uri Butts M.D.          PROGRESS NOTE:  Subjective:   Phyllis Perez is a 57 y.o. female who presents today for follow-up with a history of coronary artery disease, hypercoagulable state, dyslipidemia and syncope. She also has a permanent pacemaker.       She's had significant difficulty with orthostatic lightheadedness. We did place her on Florinef and she is increasing her salt and fluid intake. She continues to have some difficulty with orthostatic lightheadedness but no recurrent syncope. However, she has noted an intermittent hard heart rate at rest. With this, she will also become lightheaded. This is occurring several times a week and lasts up to about 5 minutes. She's only noticed it when she is sitting and noticed no aggravating or alleviating factors.    She has dyspnea on exertion about 20 feet or less. She's had no PND or orthopnea but is on oxygen therapy at night. She does have exertional hypoxemia. She's noted no edema. She has some sharp stabbing chest pain in her chest on a daily basis. This is a localized discomfort which may be at different places on her chest wall. It may last up to about a minute and be recurrent.    She has chronic diarrhea which occurs a couple of times a week. This is somewhat of an improvement, because, it was occurring on a daily basis.     Past Medical History:   Diagnosis Date   • Anemia    • Anticoagulant prescribed    • Asthma    • CAD (coronary artery disease)     Old MI/POBA   • Chronic hypoxemic respiratory failure (CMS-HCC)    • Emphysema (subcutaneous) (surgical) resulting from a procedure    • Factor V Leiden (CMS-[facility])    • History of blood clots     DVT with PE   • Hot flashes    • Hyperlipemia, mixed    • Old MI (myocardial infarction)     Treated with POBA; Cath without obstructive DZ 2009   • Ovarian cancer (CMS-HCC)    • Pacemaker    • Sleep apnea     CPAP   • SSS (sick sinus syndrome) (CMS-[facility])     Tx with pacer   • Uterine cancer (CMS-[facility])      Past Surgical History:   Procedure Laterality Date   • CERVICAL FUSION POSTERIOR  4/12/2017    Procedure: CERVICAL FUSION POSTERIOR- WITH INSTRUMENTATION C4-5, C5-6;  Surgeon: Alejandro Rayo M.D.;  Location: SURGERY [facility];  Service:    • PACEMAKER INSERTION  2010    bradycardia   • ANGIOPLASTY      ?2004   • CHOLECYSTECTOMY     • ELBOW EXPLORATION      cts   • HYSTERECTOMY LAPAROSCOPY     • KNEE REPLACEMENT, TOTAL     • KNEE REVISION TOTAL     • LUMBAR EXPLORATION      buck   • NASAL RECONSTRUCTION     • OOPHORECTOMY     • OTHER SURGICAL PROCEDURE      IVC filter placement   • SHOULDER ARTHROPLASTY TOTAL REVERSED       Family History   Problem Relation Age of Onset   • Cancer Mother    • Cancer Father    • Cancer Maternal Aunt    • Cancer Maternal Uncle      History   Smoking Status   • Former Smoker   • Years: 15.00   • Quit date: 7/10/2001   Smokeless Tobacco   • Never Used     Allergies   Allergen Reactions   • Bactrim [Sulfamethoxazole W-Trimethoprim]      constipation   • Morphine      Severe HA   • Other Misc Hives     cloraprep   • Tape      Blistering and then pop     Outpatient Encounter Prescriptions as of 12/12/2017   Medication Sig Dispense Refill   • warfarin (COUMADIN) 3 MG Tab TAKE 1 TO 1 1/2 TABLETS BY MOUTH DAILY AS DIRECTED COUMADIN CLINIC 45 Tab 0   • atorvastatin (LIPITOR) 40 MG Tab Take 1 Tab by mouth every day. 30 Tab 11   • fludrocortisone (FLORINEF) 0.1 MG Tab Take 1 Tab by mouth every day. 30 Tab 11   • potassium chloride ER (KLOR-CON) 10 MEQ tablet Take 1 Tab by mouth every day.  5   • ropinirole (REQUIP) 4 MG tablet Take 4 mg by mouth every evening.     • temazepam (RESTORIL) 30 MG capsule Take 1 Cap by mouth at bedtime as needed for Sleep. Fill at monthly intervals or  "greater. 30 Cap 0   • fentanyl (DURAGESIC) 100 MCG/HR PATCH 72 HR Apply 1 Patch to skin as directed every 72 hours.  0   • ADVAIR DISKUS 250-50 MCG/DOSE AEROSOL POWDER, BREATH ACTIVATED Inhale 1 Puff by mouth every day.  11   • hydrocodone/acetaminophen (NORCO)  MG Tab Take 1 tablet by mouth every 4 hours as needed for breakthrough pain. The earliest date the pharmacy may fill the rx is 7/18/2016. 180 Tab 0   • carisoprodol (SOMA) 350 MG Tab Take 1 Tab by mouth every 8 hours as needed for Muscle Spasms. Fill at monthly intervals or greater. 90 Tab 0   • aspirin (ASA) 81 MG Chew Tab chewable tablet Take 81 mg by mouth every day.     • albuterol (VENTOLIN OR PROVENTIL) 108 (90 BASE) MCG/ACT AERS inhalation aerosol Inhale 2 Puffs by mouth every 6 hours as needed for Shortness of Breath. 8.5 g 3   • docusate sodium (COLACE) 100 MG CAPS Take 1 Cap by mouth 2 times a day. 60 Cap 5   • fluticasone (FLONASE) 50 MCG/ACT nasal spray Spray 1 Spray in nose every day. 16 g 11   • Loratadine (CLARITIN) 10 MG CAPS Take 1 Cap by mouth every day. 30 Cap 11   • ferrous sulfate 325 (65 FE) MG tablet Take 1 Tab by mouth every day. 30 Tab 3   • enoxaparin (LOVENOX) 80 MG/0.8ML Solution inj Inject 80 mg as instructed every day. 10 Syringe 1     No facility-administered encounter medications on file as of 12/12/2017.      ROS       Objective:   /72   Pulse 86   Ht 1.702 m (5' 7\")   Wt 59.9 kg (132 lb)   SpO2 94%   BMI 20.67 kg/m²      Physical Exam   Neck: No JVD present.   Cardiovascular: Normal rate and regular rhythm.  Exam reveals no gallop.    No murmur heard.  Pulmonary/Chest: Effort normal. She has no rales.   Abdominal: Soft. There is no tenderness.   Musculoskeletal: She exhibits no edema.     Postural vital signs: Patient's blood pressure did not drop significantly. However, her heart rate increased from 60 supine to 100 standing.    Lab Results   Component Value Date/Time    CHOLSTRLTOT 183 09/07/2017 09:59 AM  "     (H) 09/07/2017 09:59 AM    HDL 53 09/07/2017 09:59 AM    TRIGLYCERIDE 141 09/07/2017 09:59 AM       Lab Results   Component Value Date/Time    SODIUM 135 09/07/2017 09:59 AM    POTASSIUM 4.6 09/07/2017 09:59 AM    CHLORIDE 100 09/07/2017 09:59 AM    CO2 26 09/07/2017 09:59 AM    GLUCOSE 83 09/07/2017 09:59 AM    BUN 9 09/07/2017 09:59 AM    CREATININE 0.71 09/07/2017 09:59 AM     Lab Results   Component Value Date/Time    ALKPHOSPHAT 72 09/07/2017 09:59 AM    ASTSGOT 18 09/07/2017 09:59 AM    ALTSGPT 7 09/07/2017 09:59 AM    TBILIRUBIN 0.5 09/07/2017 09:59 AM      No results found for: BNPBTYPENAT     EKG: This shows a normal sinus rhythm with nonspecific T-wave inversion diffusely.    Assessment:     1. Coronary arteriosclerosis     2. Mixed hyperlipidemia     3. Factor V Leiden (CMS-HCC)     4. Chronic anticoagulation     5. Syncope, unspecified syncope type         Medical Decision Making:  Today's Assessment / Status / Plan:     Ms. Perez Continues to have some difficulty with orthostatic lightheadedness. This has improved somewhat but she has also noticed some palpitations. She is advised to use compression stockings and we will obtain an event monitor. Her lipid status is not under good control and we will add ezetimibe 10 mg daily. She will follow-up in about 3 months with repeat lab work.      Polina Zamora M.D.  [address] [place] [id number]  VIA Facsimile: [phone]

## 2018-01-02 ENCOUNTER — TELEPHONE (OUTPATIENT)
Dept: CARDIOLOGY | Facility: MEDICAL CENTER | Age: 58
End: 2018-01-02

## 2018-01-02 ENCOUNTER — NON-PROVIDER VISIT (OUTPATIENT)
Dept: CARDIOLOGY | Facility: MEDICAL CENTER | Age: 58
End: 2018-01-02
Attending: INTERNAL MEDICINE
Payer: MEDICARE

## 2018-01-02 DIAGNOSIS — I47.20 VT (VENTRICULAR TACHYCARDIA) (HCC): ICD-10-CM

## 2018-01-02 DIAGNOSIS — I47.10 PSVT (PAROXYSMAL SUPRAVENTRICULAR TACHYCARDIA): ICD-10-CM

## 2018-01-02 DIAGNOSIS — R00.2 PALPITATIONS: ICD-10-CM

## 2018-01-02 DIAGNOSIS — R00.0 SINUS TACHYCARDIA: ICD-10-CM

## 2018-01-02 DIAGNOSIS — R00.1 SINUS BRADYCARDIA: ICD-10-CM

## 2018-01-12 ENCOUNTER — TELEPHONE (OUTPATIENT)
Dept: VASCULAR LAB | Facility: MEDICAL CENTER | Age: 58
End: 2018-01-12

## 2018-01-29 PROCEDURE — 0296T PR EXT ECG > 48HR TO 21 DAY RCRD W/CONECT INTL RCRD: CPT | Performed by: INTERNAL MEDICINE

## 2018-01-29 PROCEDURE — 0298T PR EXT ECG > 48HR TO 21 DAY REVIEW AND INTERPRETATN: CPT | Performed by: INTERNAL MEDICINE

## 2018-02-07 LAB — INR PPP: 2.9 (ref 2–3.5)

## 2018-02-14 LAB — INR PPP: 2.4 (ref 2–3.5)

## 2018-02-21 LAB — INR PPP: 2.4 (ref 2–3.5)

## 2018-02-28 LAB — INR PPP: 2.8 (ref 2–3.5)

## 2018-03-01 ENCOUNTER — ANTICOAGULATION MONITORING (OUTPATIENT)
Dept: VASCULAR LAB | Facility: MEDICAL CENTER | Age: 58
End: 2018-03-01

## 2018-03-01 DIAGNOSIS — D68.51 FACTOR V LEIDEN (HCC): ICD-10-CM

## 2018-03-01 NOTE — PROGRESS NOTES
Anticoagulation Summary  As of 3/1/2018    INR goal:   2.5-3.5   TTR:   46.5 % (2.5 y)   Today's INR:   2.8 (2/28/2018)   Maintenance plan:   4.5 mg (3 mg x 1.5) on Mon, Wed, Fri; 3 mg (3 mg x 1) all other days   Weekly total:   25.5 mg   No change documented:   Deacon Novak PharmD   Plan last modified:   Deacon Novak PharmD (8/15/2016)   Next INR check:   3/7/2018   Target end date:   Indefinite    Indications    Factor V Leiden (CMS-[facility]) [D68.51]  Deep vein thrombosis [453.40] (Resolved) [I82.409]  Pulmonary embolism [415.19] (Resolved) [I26.99]             Anticoagulation Episode Summary     [facility] check location:   [facility]    Preferred lab:       Send INR reminders to:       Comments:   PATIENT SELF MONITORING      Anticoagulation Care Providers     Provider Role Specialty Phone number    DERICK Diehl Referring Family Medicine [phone]    Eliseo Eduardo M.D. Responsible Cardiology [phone]    Deacon Novak, Sandhya Responsible          Anticoagulation Patient Findings    This patient is enrolled in the Patient Self Management Program    Deacon Novak, Sandhya

## 2018-03-29 ENCOUNTER — OFFICE VISIT (OUTPATIENT)
Dept: CARDIOLOGY | Facility: MEDICAL CENTER | Age: 58
End: 2018-03-29
Payer: MEDICARE

## 2018-03-29 VITALS
HEART RATE: 122 BPM | BODY MASS INDEX: 20.46 KG/M2 | DIASTOLIC BLOOD PRESSURE: 60 MMHG | HEIGHT: 68 IN | OXYGEN SATURATION: 96 % | SYSTOLIC BLOOD PRESSURE: 90 MMHG | WEIGHT: 135 LBS

## 2018-03-29 DIAGNOSIS — R55 SYNCOPE, UNSPECIFIED SYNCOPE TYPE: ICD-10-CM

## 2018-03-29 DIAGNOSIS — D68.2 FACTOR V DEFICIENCY (HCC): ICD-10-CM

## 2018-03-29 DIAGNOSIS — J44.9 CHRONIC OBSTRUCTIVE PULMONARY DISEASE, UNSPECIFIED COPD TYPE (HCC): ICD-10-CM

## 2018-03-29 DIAGNOSIS — D68.51 FACTOR V LEIDEN (HCC): ICD-10-CM

## 2018-03-29 DIAGNOSIS — E78.2 MIXED HYPERLIPIDEMIA: ICD-10-CM

## 2018-03-29 DIAGNOSIS — I25.10 CORONARY ARTERIOSCLEROSIS: ICD-10-CM

## 2018-03-29 PROCEDURE — 99214 OFFICE O/P EST MOD 30 MIN: CPT | Performed by: INTERNAL MEDICINE

## 2018-03-29 RX ORDER — WARFARIN SODIUM 3 MG/1
TABLET ORAL
Qty: 45 TAB | Refills: 0 | Status: SHIPPED | OUTPATIENT
Start: 2018-03-29 | End: 2018-04-24 | Stop reason: SDUPTHER

## 2018-03-29 NOTE — LETTER
[facility]   [address], [address]  [place] [address]  Phone: [phone]  Fax: [phone]              Phyllis Perez  1960    Encounter Date: 3/29/2018    Uri Butts M.D.          PROGRESS NOTE:  Subjective:   Phyllis Perez is a 57 y.o. female who presents today for follow-up with a history of coronary artery disease, hypercoagulable state, dyslipidemia and syncope. She also has a permanent pacemaker.       She's had significant difficulty with orthostatic lightheadedness. We did place her on Florinef and she is increasing her salt and fluid intake. She has undergone evaluation with a 14 day event monitor. She states that on the day of his placement she had a syncopal episode in the afternoon at about 5:30 PM.    This episode occurred while she was taking down her [place] decorations. She heard a knock on the door and went to get it. She subsequent had a syncopal episode and was not on her oxygen therapy at that time. Today while coming in to clinic, she also had another near syncopal episode. Again she was not on oxygen therapy.    She has dyspnea on exertion about 20 feet or less. She's had no PND or orthopnea but is on oxygen therapy at night. She does have exertional hypoxemia. She's noted no edema. She has some sharp stabbing chest pain in her chest on a daily basis. This is a localized discomfort which may be at different places on her chest wall. It may last up to about a minute and be recurrent. Her chest discomfort seems to be improved if she moves her chest.    She has chronic diarrhea which occurs a couple of times a week. This is somewhat of an improvement, because, it was occurring on a daily basis.     Past Medical History:   Diagnosis Date   • Anemia    • Anticoagulant prescribed    • Asthma    • CAD (coronary artery disease)     Old MI/POBA   • Chronic hypoxemic respiratory failure (CMS-HCC)    • Emphysema (subcutaneous) (surgical)  resulting from a procedure    • Factor V Leiden (CMS-HCC)    • History of blood clots     DVT with PE   • Hot flashes    • Hyperlipemia, mixed    • Old MI (myocardial infarction)     Treated with POBA; Cath without obstructive DZ 2009   • Ovarian cancer (CMS-HCC)    • Pacemaker    • Sleep apnea     CPAP   • SSS (sick sinus syndrome) (CMS-HCC)     Tx with pacer   • Uterine cancer (CMS-HCC)      Past Surgical History:   Procedure Laterality Date   • CERVICAL FUSION POSTERIOR  4/12/2017    Procedure: CERVICAL FUSION POSTERIOR- WITH INSTRUMENTATION C4-5, C5-6;  Surgeon: Alejandro Rayo M.D.;  Location: SURGERY [facility];  Service:    • PACEMAKER INSERTION  2010    bradycardia   • ANGIOPLASTY      ?2004   • CHOLECYSTECTOMY     • ELBOW EXPLORATION      cts   • HYSTERECTOMY LAPAROSCOPY     • KNEE REPLACEMENT, TOTAL     • KNEE REVISION TOTAL     • LUMBAR EXPLORATION      buck   • NASAL RECONSTRUCTION     • OOPHORECTOMY     • OTHER SURGICAL PROCEDURE      IVC filter placement   • SHOULDER ARTHROPLASTY TOTAL REVERSED       Family History   Problem Relation Age of Onset   • Cancer Mother    • Cancer Father    • Cancer Maternal Aunt    • Cancer Maternal Uncle      History   Smoking Status   • Former Smoker   • Years: 15.00   • Quit date: 7/10/2001   Smokeless Tobacco   • Never Used     Allergies   Allergen Reactions   • Bactrim [Sulfamethoxazole W-Trimethoprim]      constipation   • Morphine      Severe HA   • Other Misc Hives     cloraprep   • Tape      Blistering and then pop     Outpatient Encounter Prescriptions as of 3/29/2018   Medication Sig Dispense Refill   • ezetimibe (ZETIA) 10 MG Tab Take 1 Tab by mouth every day. 30 Tab 11   • warfarin (COUMADIN) 3 MG Tab TAKE 1 TO 1 1/2 TABLETS BY MOUTH DAILY AS DIRECTED COUMADIN CLINIC 45 Tab 0   • enoxaparin (LOVENOX) 80 MG/0.8ML Solution inj Inject 80 mg as instructed every day. 10 Syringe 1   • atorvastatin (LIPITOR) 40 MG Tab Take 1 Tab by mouth every day. 30 Tab 11   •  "fludrocortisone (FLORINEF) 0.1 MG Tab Take 1 Tab by mouth every day. 30 Tab 11   • potassium chloride ER (KLOR-CON) 10 MEQ tablet Take 1 Tab by mouth every day.  5   • ropinirole (REQUIP) 4 MG tablet Take 4 mg by mouth every evening.     • temazepam (RESTORIL) 30 MG capsule Take 1 Cap by mouth at bedtime as needed for Sleep. Fill at monthly intervals or greater. 30 Cap 0   • fentanyl (DURAGESIC) 100 MCG/HR PATCH 72 HR Apply 1 Patch to skin as directed every 72 hours.  0   • ADVAIR DISKUS 250-50 MCG/DOSE AEROSOL POWDER, BREATH ACTIVATED Inhale 1 Puff by mouth every day.  11   • hydrocodone/acetaminophen (NORCO)  MG Tab Take 1 tablet by mouth every 4 hours as needed for breakthrough pain. The earliest date the pharmacy may fill the rx is 7/18/2016. 180 Tab 0   • carisoprodol (SOMA) 350 MG Tab Take 1 Tab by mouth every 8 hours as needed for Muscle Spasms. Fill at monthly intervals or greater. 90 Tab 0   • aspirin (ASA) 81 MG Chew Tab chewable tablet Take 81 mg by mouth every day.     • albuterol (VENTOLIN OR PROVENTIL) 108 (90 BASE) MCG/ACT AERS inhalation aerosol Inhale 2 Puffs by mouth every 6 hours as needed for Shortness of Breath. 8.5 g 3   • fluticasone (FLONASE) 50 MCG/ACT nasal spray Spray 1 Spray in nose every day. 16 g 11   • Loratadine (CLARITIN) 10 MG CAPS Take 1 Cap by mouth every day. 30 Cap 11   • ferrous sulfate 325 (65 FE) MG tablet Take 1 Tab by mouth every day. 30 Tab 3   • docusate sodium (COLACE) 100 MG CAPS Take 1 Cap by mouth 2 times a day. 60 Cap 5     No facility-administered encounter medications on file as of 3/29/2018.      ROS     Objective:   BP (!) 90/60   Pulse (!) 122   Ht 1.727 m (5' 8\")   Wt 61.2 kg (135 lb)   SpO2 96%   BMI 20.53 kg/m²      Physical Exam   Neck: No JVD present.   Cardiovascular: Normal rate and regular rhythm.  Exam reveals no gallop.    No murmur heard.  Pulmonary/Chest: Effort normal. She has no rales.   Abdominal: Soft. There is no tenderness.   "   Musculoskeletal: She exhibits no edema.     Postural vital signs: Patient's blood pressure did not drop significantly. However, her heart rate increased from 60 supine to 100 standing.        Lab Results   Component Value Date/Time    CHOLSTRLTOT 183 09/07/2017 09:59 AM     (H) 09/07/2017 09:59 AM    HDL 53 09/07/2017 09:59 AM    TRIGLYCERIDE 141 09/07/2017 09:59 AM       Lab Results   Component Value Date/Time    SODIUM 135 09/07/2017 09:59 AM    POTASSIUM 4.6 09/07/2017 09:59 AM    CHLORIDE 100 09/07/2017 09:59 AM    CO2 26 09/07/2017 09:59 AM    GLUCOSE 83 09/07/2017 09:59 AM    BUN 9 09/07/2017 09:59 AM    CREATININE 0.71 09/07/2017 09:59 AM     Lab Results   Component Value Date/Time    ALKPHOSPHAT 72 09/07/2017 09:59 AM    ASTSGOT 18 09/07/2017 09:59 AM    ALTSGPT 7 09/07/2017 09:59 AM    TBILIRUBIN 0.5 09/07/2017 09:59 AM      No results found for: BNPBTYPENAT     EKG: This shows a normal sinus rhythm with nonspecific T-wave inversion diffusely.     Event monitor: This showed a normal sinus rhythm with sinus bradycardia and sinus tachycardia. Occasional ventricular pacing was noted. She did have some brief episodes of PSVT. She had also had a brief episode of nonsustained VT versus an accelerated idioventricular rhythm. We do not have the exact strips when she said she had her syncopal episode.      Assessment:     1. Syncope, unspecified syncope type     2. Coronary arteriosclerosis     3. Factor V Leiden (CMS-HCC)     4. Mixed hyperlipidemia     5. Chronic obstructive pulmonary disease, unspecified COPD type (CMS-HCC)         Medical Decision Making:  Today's Assessment / Status / Plan:     Ms. Perez is having recurrent syncopal episodes. I suspect they're associated with exertional hypoxemia and sinus tachycardia. However, we will obtain the rhythm strips from the time course for she said she had the event. She was doing better clinically and she is on the same medication. I feel she should wear  her oxygen 24/7 and use compression stockings in addition to her Florinef. She has increased her salt intake and she needs to stay well-hydrated. Given the possible nonsustained VT, we will reevaluate her LV function with an echocardiogram. She'll follow-up in a couple of months. She did not get blood work as requested and this will be reordered.                                        Physical Exam   Neck: No JVD present.   Cardiovascular: Normal rate and regular rhythm.  Exam reveals no gallop.    No murmur heard.  Pulmonary/Chest: Effort normal. She has no rales.   Abdominal: Soft. There is no tenderness.   Musculoskeletal: She exhibits no edema.              Polina Zamora M.D.  [address] [facility] [address] [address]  VIA Facsimile: [phone]

## 2018-03-29 NOTE — PROGRESS NOTES
Subjective:     Chief complaint: Coronary artery disease and syncope    Phyllis Perez is a 57 y.o. female who presents today for follow-up with a history of coronary artery disease, hypercoagulable state, dyslipidemia and syncope. She also has a permanent pacemaker.       She's had significant difficulty with orthostatic lightheadedness. We did place her on Florinef and she is increasing her salt and fluid intake. She has undergone evaluation with a 14 day event monitor. She states that on the day of his placement she had a syncopal episode in the afternoon at about 5:30 PM.    This episode occurred while she was taking down her [place] decorations. She heard a knock on the door and went to get it. She subsequent had a syncopal episode and was not on her oxygen therapy at that time. Today while coming in to clinic, she also had another near syncopal episode. Again she was not on oxygen therapy.    She has dyspnea on exertion about 20 feet or less. She's had no PND or orthopnea but is on oxygen therapy at night. She does have exertional hypoxemia. She's noted no edema. She has some sharp stabbing chest pain in her chest on a daily basis. This is a localized discomfort which may be at different places on her chest wall. It may last up to about a minute and be recurrent. Her chest discomfort seems to be improved if she moves her chest.    She has chronic diarrhea which occurs a couple of times a week. This is somewhat of an improvement, because, it was occurring on a daily basis.     Past Medical History:   Diagnosis Date   • Anemia    • Anticoagulant prescribed    • Asthma    • CAD (coronary artery disease)     Old MI/POBA   • Chronic hypoxemic respiratory failure (CMS-HCC)    • Emphysema (subcutaneous) (surgical) resulting from a procedure    • Factor V Leiden (CMS-HCC)    • History of blood clots     DVT with PE   • Hot flashes    • Hyperlipemia, mixed    • Old MI (myocardial infarction)     Treated with POBA;  Cath without obstructive DZ 2009   • Ovarian cancer (CMS-HCC)    • Pacemaker    • Sleep apnea     CPAP   • SSS (sick sinus syndrome) (CMS-HCC)     Tx with pacer   • Uterine cancer (CMS-HCC)      Past Surgical History:   Procedure Laterality Date   • CERVICAL FUSION POSTERIOR  4/12/2017    Procedure: CERVICAL FUSION POSTERIOR- WITH INSTRUMENTATION C4-5, C5-6;  Surgeon: Alejandro Rayo M.D.;  Location: SURGERY [facility];  Service:    • PACEMAKER INSERTION  2010    bradycardia   • ANGIOPLASTY      ?2004   • CHOLECYSTECTOMY     • ELBOW EXPLORATION      cts   • HYSTERECTOMY LAPAROSCOPY     • KNEE REPLACEMENT, TOTAL     • KNEE REVISION TOTAL     • LUMBAR EXPLORATION      buck   • NASAL RECONSTRUCTION     • OOPHORECTOMY     • OTHER SURGICAL PROCEDURE      IVC filter placement   • SHOULDER ARTHROPLASTY TOTAL REVERSED       Family History   Problem Relation Age of Onset   • Cancer Mother    • Cancer Father    • Cancer Maternal Aunt    • Cancer Maternal Uncle      History   Smoking Status   • Former Smoker   • Years: 15.00   • Quit date: 7/10/2001   Smokeless Tobacco   • Never Used     Allergies   Allergen Reactions   • Bactrim [Sulfamethoxazole W-Trimethoprim]      constipation   • Morphine      Severe HA   • Other Misc Hives     cloraprep   • Tape      Blistering and then pop     Outpatient Encounter Prescriptions as of 3/29/2018   Medication Sig Dispense Refill   • ezetimibe (ZETIA) 10 MG Tab Take 1 Tab by mouth every day. 30 Tab 11   • warfarin (COUMADIN) 3 MG Tab TAKE 1 TO 1 1/2 TABLETS BY MOUTH DAILY AS DIRECTED COUMADIN CLINIC 45 Tab 0   • enoxaparin (LOVENOX) 80 MG/0.8ML Solution inj Inject 80 mg as instructed every day. 10 Syringe 1   • atorvastatin (LIPITOR) 40 MG Tab Take 1 Tab by mouth every day. 30 Tab 11   • fludrocortisone (FLORINEF) 0.1 MG Tab Take 1 Tab by mouth every day. 30 Tab 11   • potassium chloride ER (KLOR-CON) 10 MEQ tablet Take 1 Tab by mouth every day.  5   • ropinirole (REQUIP) 4 MG tablet  "Take 4 mg by mouth every evening.     • temazepam (RESTORIL) 30 MG capsule Take 1 Cap by mouth at bedtime as needed for Sleep. Fill at monthly intervals or greater. 30 Cap 0   • fentanyl (DURAGESIC) 100 MCG/HR PATCH 72 HR Apply 1 Patch to skin as directed every 72 hours.  0   • ADVAIR DISKUS 250-50 MCG/DOSE AEROSOL POWDER, BREATH ACTIVATED Inhale 1 Puff by mouth every day.  11   • hydrocodone/acetaminophen (NORCO)  MG Tab Take 1 tablet by mouth every 4 hours as needed for breakthrough pain. The earliest date the pharmacy may fill the rx is 7/18/2016. 180 Tab 0   • carisoprodol (SOMA) 350 MG Tab Take 1 Tab by mouth every 8 hours as needed for Muscle Spasms. Fill at monthly intervals or greater. 90 Tab 0   • aspirin (ASA) 81 MG Chew Tab chewable tablet Take 81 mg by mouth every day.     • albuterol (VENTOLIN OR PROVENTIL) 108 (90 BASE) MCG/ACT AERS inhalation aerosol Inhale 2 Puffs by mouth every 6 hours as needed for Shortness of Breath. 8.5 g 3   • fluticasone (FLONASE) 50 MCG/ACT nasal spray Spray 1 Spray in nose every day. 16 g 11   • Loratadine (CLARITIN) 10 MG CAPS Take 1 Cap by mouth every day. 30 Cap 11   • ferrous sulfate 325 (65 FE) MG tablet Take 1 Tab by mouth every day. 30 Tab 3   • docusate sodium (COLACE) 100 MG CAPS Take 1 Cap by mouth 2 times a day. 60 Cap 5     No facility-administered encounter medications on file as of 3/29/2018.      ROS     Objective:   BP (!) 90/60   Pulse (!) 122   Ht 1.727 m (5' 8\")   Wt 61.2 kg (135 lb)   SpO2 96%   BMI 20.53 kg/m²     Physical Exam   Neck: No JVD present.   Cardiovascular: Normal rate and regular rhythm.  Exam reveals no gallop.    No murmur heard.  Pulmonary/Chest: Effort normal. She has no rales.   Abdominal: Soft. There is no tenderness.   Musculoskeletal: She exhibits no edema.     Postural vital signs: Patient's blood pressure did not drop significantly. However, her heart rate increased from 60 supine to 100 standing.        Lab Results "   Component Value Date/Time    CHOLSTRLTOT 183 09/07/2017 09:59 AM     (H) 09/07/2017 09:59 AM    HDL 53 09/07/2017 09:59 AM    TRIGLYCERIDE 141 09/07/2017 09:59 AM       Lab Results   Component Value Date/Time    SODIUM 135 09/07/2017 09:59 AM    POTASSIUM 4.6 09/07/2017 09:59 AM    CHLORIDE 100 09/07/2017 09:59 AM    CO2 26 09/07/2017 09:59 AM    GLUCOSE 83 09/07/2017 09:59 AM    BUN 9 09/07/2017 09:59 AM    CREATININE 0.71 09/07/2017 09:59 AM     Lab Results   Component Value Date/Time    ALKPHOSPHAT 72 09/07/2017 09:59 AM    ASTSGOT 18 09/07/2017 09:59 AM    ALTSGPT 7 09/07/2017 09:59 AM    TBILIRUBIN 0.5 09/07/2017 09:59 AM      No results found for: BNPBTYPENAT     EKG: This shows a normal sinus rhythm with nonspecific T-wave inversion diffusely.     Event monitor: This showed a normal sinus rhythm with sinus bradycardia and sinus tachycardia. Occasional ventricular pacing was noted. She did have some brief episodes of PSVT. She had also had a brief episode of nonsustained VT versus an accelerated idioventricular rhythm. We do not have the exact strips when she said she had her syncopal episode.      Assessment:     1. Syncope, unspecified syncope type     2. Coronary arteriosclerosis     3. Factor V Leiden (CMS-HCC)     4. Mixed hyperlipidemia     5. Chronic obstructive pulmonary disease, unspecified COPD type (CMS-HCC)         Medical Decision Making:  Today's Assessment / Status / Plan:     Ms. Perez is having recurrent syncopal episodes. I suspect they're associated with exertional hypoxemia and sinus tachycardia. However, we will obtain the rhythm strips from the time course for she said she had the event. She was doing better clinically and she is on the same medication. I feel she should wear her oxygen 24/7 and use compression stockings in addition to her Florinef. She has increased her salt intake and she needs to stay well-hydrated. Given the possible nonsustained VT, we will reevaluate her  LV function with an echocardiogram. She'll follow-up in a couple of months. She did not get blood work as requested and this will be reordered.                                        Physical Exam   Neck: No JVD present.   Cardiovascular: Normal rate and regular rhythm.  Exam reveals no gallop.    No murmur heard.  Pulmonary/Chest: Effort normal. She has no rales.   Abdominal: Soft. There is no tenderness.   Musculoskeletal: She exhibits no edema.

## 2018-03-30 RX ORDER — WARFARIN SODIUM 3 MG/1
TABLET ORAL
Qty: 45 TAB | Refills: 0 | OUTPATIENT
Start: 2018-03-30

## 2018-04-02 ENCOUNTER — ANTICOAGULATION MONITORING (OUTPATIENT)
Dept: VASCULAR LAB | Facility: MEDICAL CENTER | Age: 58
End: 2018-04-02

## 2018-04-02 DIAGNOSIS — D68.51 FACTOR V LEIDEN (HCC): ICD-10-CM

## 2018-04-02 LAB — INR PPP: 1.8 (ref 2–3.5)

## 2018-04-02 NOTE — PROGRESS NOTES
Anticoagulation Summary  As of 4/2/2018    INR goal:   2.5-3.5   TTR:   45.9 % (2.6 y)   Today's INR:   1.8!   Maintenance plan:   4.5 mg (3 mg x 1.5) on Mon, Wed, Fri; 3 mg (3 mg x 1) all other days   Weekly total:   25.5 mg   Plan last modified:   Deacon Novak PharmMIESHA (8/15/2016)   Next INR check:   4/9/2018   Target end date:   Indefinite    Indications    Factor V Leiden (CMS-[facility]) [D68.51]  Deep vein thrombosis [453.40] (Resolved) [I82.409]  Pulmonary embolism [415.19] (Resolved) [I26.99]             Anticoagulation Episode Summary     [facility]   [facility]    Preferred lab:       Send INR reminders to:       Comments:   PATIENT SELF MONITORING      Anticoagulation Care Providers     Provider Role Specialty Phone number    DERICK Diehl Referring Family Medicine [phone]    Eliseo Eduardo M.D. Responsible Cardiology [phone]    Deacon Novak, PharmD Responsible          Anticoagulation Patient Findings    Patient states that she was previously off warfarin for a procedure and was bridging with Lovenox. Instructed to increase warfarin to 6 mg tonight, 4.5 mg tomorrow, then resume previously prescribed regimen. Follow up INR in one week.    Deacon Novak, PharmD

## 2018-04-10 ENCOUNTER — APPOINTMENT (OUTPATIENT)
Dept: CARDIOLOGY | Facility: MEDICAL CENTER | Age: 58
End: 2018-04-10
Attending: INTERNAL MEDICINE
Payer: MEDICARE

## 2018-04-13 ENCOUNTER — ANTICOAGULATION MONITORING (OUTPATIENT)
Dept: VASCULAR LAB | Facility: MEDICAL CENTER | Age: 58
End: 2018-04-13

## 2018-04-13 DIAGNOSIS — D68.51 FACTOR V LEIDEN (HCC): ICD-10-CM

## 2018-04-13 LAB — INR PPP: 2 (ref 2–3.5)

## 2018-04-13 NOTE — PROGRESS NOTES
Anticoagulation Summary  As of 4/13/2018    INR goal:   2.5-3.5   TTR:   45.4 % (2.6 y)   Today's INR:   2.0!   Maintenance plan:   4.5 mg (3 mg x 1.5) on Mon, Wed, Fri; 3 mg (3 mg x 1) all other days   Weekly total:   25.5 mg   Plan last modified:   Deacon Novak, PharmMIESHA (8/15/2016)   Next INR check:   4/20/2018   Target end date:   Indefinite    Indications    Factor V Leiden (CMS-[facility]) [D68.51]  Deep vein thrombosis [453.40] (Resolved) [I82.409]  Pulmonary embolism [415.19] (Resolved) [I26.99]             Anticoagulation Episode Summary     [facility] check [facility]   [facility]    Preferred lab:       Send INR reminders to:       Comments:   PATIENT SELF MONITORING      Anticoagulation Care Providers     Provider Role Specialty Phone number    DERICK Diehl Referring Family Medicine [phone]    Eliseo Eduardo M.D. Responsible Cardiology [phone]    Deacon Novak, PharmD Responsible          Anticoagulation Patient Findings    Spoke to patient on phone. Increase warfarin dose tonight to 6 mg and tomorrow to 4.5 mg then resume previous regimen. Follow up INR in one week.    Deacon Novak, PharmD

## 2018-04-18 ENCOUNTER — APPOINTMENT (OUTPATIENT)
Dept: CARDIOLOGY | Facility: MEDICAL CENTER | Age: 58
End: 2018-04-18
Attending: INTERNAL MEDICINE
Payer: MEDICARE

## 2018-04-24 ENCOUNTER — ANTICOAGULATION MONITORING (OUTPATIENT)
Dept: VASCULAR LAB | Facility: MEDICAL CENTER | Age: 58
End: 2018-04-24

## 2018-04-24 DIAGNOSIS — D68.51 FACTOR V LEIDEN (HCC): ICD-10-CM

## 2018-04-24 DIAGNOSIS — D68.2 FACTOR V DEFICIENCY (HCC): ICD-10-CM

## 2018-04-24 LAB — INR PPP: 2.6 (ref 2–3.5)

## 2018-04-24 RX ORDER — WARFARIN SODIUM 3 MG/1
TABLET ORAL
Qty: 135 TAB | Refills: 1 | Status: SHIPPED | OUTPATIENT
Start: 2018-04-24 | End: 2018-10-18 | Stop reason: SDUPTHER

## 2018-04-24 NOTE — PROGRESS NOTES
Anticoagulation Summary  As of 4/24/2018    INR goal:   2.5-3.5   TTR:   45.0 % (2.7 y)   Today's INR:   2.6   Maintenance plan:   4.5 mg (3 mg x 1.5) on Mon, Wed, Fri; 3 mg (3 mg x 1) all other days   Weekly total:   25.5 mg   Plan last modified:   Deacon Novak PharmD (8/15/2016)   Next INR check:   5/1/2018   Target end date:   Indefinite    Indications    Factor V Leiden (CMS-[facility]) [D68.51]  Deep vein thrombosis [453.40] (Resolved) [I82.409]  Pulmonary embolism [415.19] (Resolved) [I26.99]             Anticoagulation Episode Summary     [facility]   [facility]    Preferred lab:       Send INR reminders to:       Comments:   PATIENT SELF MONITORING      Anticoagulation Care Providers     Provider Role Specialty Phone number    DERICK Diehl Referring Family Medicine [phone]    Eliseo Eduardo M.D. Responsible Cardiology [phone]    Deacon Novak, PharmD Responsible          Anticoagulation Patient Findings    Patient is enrolled in self management program.    Deacon Novak, PharmD

## 2018-04-26 ENCOUNTER — HOSPITAL ENCOUNTER (OUTPATIENT)
Dept: CARDIOLOGY | Facility: MEDICAL CENTER | Age: 58
End: 2018-04-26
Attending: INTERNAL MEDICINE
Payer: MEDICARE

## 2018-04-26 DIAGNOSIS — R55 SYNCOPE, UNSPECIFIED SYNCOPE TYPE: ICD-10-CM

## 2018-04-26 DIAGNOSIS — I25.10 CORONARY ARTERIOSCLEROSIS: ICD-10-CM

## 2018-04-26 DIAGNOSIS — J44.9 CHRONIC OBSTRUCTIVE PULMONARY DISEASE, UNSPECIFIED COPD TYPE (HCC): ICD-10-CM

## 2018-04-26 LAB
LV EJECT FRACT  99904: 55
LV EJECT FRACT MOD 2C 99903: 47.6
LV EJECT FRACT MOD 4C 99902: 58.9
LV EJECT FRACT MOD BP 99901: 52.92

## 2018-04-26 PROCEDURE — 93306 TTE W/DOPPLER COMPLETE: CPT | Mod: 26 | Performed by: INTERNAL MEDICINE

## 2018-04-26 PROCEDURE — 93306 TTE W/DOPPLER COMPLETE: CPT

## 2018-05-30 LAB — INR PPP: 3 (ref 2–3.5)

## 2018-06-06 LAB — INR PPP: 3 (ref 2–3.5)

## 2018-06-08 ENCOUNTER — TELEPHONE (OUTPATIENT)
Dept: CARDIOLOGY | Facility: MEDICAL CENTER | Age: 58
End: 2018-06-08

## 2018-06-08 NOTE — TELEPHONE ENCOUNTER
Patient has upcoming office visit with [facility], spoke to patient and she will get labs done on Tuesday.

## 2018-06-12 ENCOUNTER — ANTICOAGULATION MONITORING (OUTPATIENT)
Dept: VASCULAR LAB | Facility: MEDICAL CENTER | Age: 58
End: 2018-06-12

## 2018-06-12 ENCOUNTER — HOSPITAL ENCOUNTER (OUTPATIENT)
Dept: LAB | Facility: MEDICAL CENTER | Age: 58
End: 2018-06-12
Attending: INTERNAL MEDICINE
Payer: MEDICARE

## 2018-06-12 DIAGNOSIS — I25.10 CORONARY ARTERIOSCLEROSIS: ICD-10-CM

## 2018-06-12 DIAGNOSIS — E78.2 MIXED HYPERLIPIDEMIA: ICD-10-CM

## 2018-06-12 DIAGNOSIS — R55 SYNCOPE, UNSPECIFIED SYNCOPE TYPE: ICD-10-CM

## 2018-06-12 DIAGNOSIS — D68.51 FACTOR V LEIDEN (HCC): ICD-10-CM

## 2018-06-12 LAB
ALBUMIN SERPL BCP-MCNC: 3.8 G/DL (ref 3.2–4.9)
ALBUMIN/GLOB SERPL: 1.2 G/DL
ALP SERPL-CCNC: 78 U/L (ref 30–99)
ALT SERPL-CCNC: 8 U/L (ref 2–50)
ANION GAP SERPL CALC-SCNC: 7 MMOL/L (ref 0–11.9)
AST SERPL-CCNC: 14 U/L (ref 12–45)
BILIRUB SERPL-MCNC: 0.3 MG/DL (ref 0.1–1.5)
BUN SERPL-MCNC: 7 MG/DL (ref 8–22)
CALCIUM SERPL-MCNC: 9 MG/DL (ref 8.5–10.5)
CHLORIDE SERPL-SCNC: 103 MMOL/L (ref 96–112)
CHOLEST SERPL-MCNC: 244 MG/DL (ref 100–199)
CO2 SERPL-SCNC: 29 MMOL/L (ref 20–33)
CREAT SERPL-MCNC: 0.91 MG/DL (ref 0.5–1.4)
GLOBULIN SER CALC-MCNC: 3.3 G/DL (ref 1.9–3.5)
GLUCOSE SERPL-MCNC: 87 MG/DL (ref 65–99)
HDLC SERPL-MCNC: 58 MG/DL
INR PPP: 3.4 (ref 2–3.5)
LDLC SERPL CALC-MCNC: 128 MG/DL
POTASSIUM SERPL-SCNC: 4.5 MMOL/L (ref 3.6–5.5)
PROT SERPL-MCNC: 7.1 G/DL (ref 6–8.2)
SODIUM SERPL-SCNC: 139 MMOL/L (ref 135–145)
TRIGL SERPL-MCNC: 290 MG/DL (ref 0–149)

## 2018-06-12 PROCEDURE — 36415 COLL VENOUS BLD VENIPUNCTURE: CPT

## 2018-06-12 PROCEDURE — 80053 COMPREHEN METABOLIC PANEL: CPT

## 2018-06-12 PROCEDURE — 80061 LIPID PANEL: CPT

## 2018-06-12 NOTE — PROGRESS NOTES
Anticoagulation Summary  As of 6/12/2018    INR goal:   2.5-3.5   TTR:   47.7 % (2.8 y)   Today's INR:   3.4   Warfarin maintenance plan:   4.5 mg (3 mg x 1.5) on Mon, Wed, Fri; 3 mg (3 mg x 1) all other days   Weekly warfarin total:   25.5 mg   No change documented:   Deacon Novak PharmD   Plan last modified:   Deacon Novak PharmD (8/15/2016)   Next INR check:   6/19/2018   Target end date:   Indefinite    Indications    Factor V Leiden (HCC) [D68.51]  Deep vein thrombosis [453.40] (Resolved) [I82.409]  Pulmonary embolism [415.19] (Resolved) [I26.99]             Anticoagulation Episode Summary     [facility]    Preferred lab:       Send INR reminders to:       Comments:   PATIENT SELF MONITORING      Anticoagulation Care Providers     Provider Role Specialty Phone number    DERICK Dihel Referring Family Medicine [phone]    Eliseo Eduardo M.D. Responsible Cardiology [phone]    Deacon Novak, Sandhya Responsible          Anticoagulation Patient Findings    Patient is enrolled in Self Management Program.    Deacon Novak, Sandhya

## 2018-06-15 RX ORDER — ATORVASTATIN CALCIUM 40 MG/1
TABLET, FILM COATED ORAL
Qty: 90 TAB | Refills: 3 | Status: SHIPPED | OUTPATIENT
Start: 2018-06-15 | End: 2018-06-19 | Stop reason: SDUPTHER

## 2018-06-15 RX ORDER — FLUDROCORTISONE ACETATE 0.1 MG/1
TABLET ORAL
Qty: 90 TAB | Refills: 3 | Status: SHIPPED | OUTPATIENT
Start: 2018-06-15 | End: 2019-06-20 | Stop reason: SDUPTHER

## 2018-06-19 ENCOUNTER — OFFICE VISIT (OUTPATIENT)
Dept: CARDIOLOGY | Facility: MEDICAL CENTER | Age: 58
End: 2018-06-19
Payer: MEDICARE

## 2018-06-19 VITALS
HEART RATE: 80 BPM | WEIGHT: 148 LBS | SYSTOLIC BLOOD PRESSURE: 92 MMHG | DIASTOLIC BLOOD PRESSURE: 60 MMHG | HEIGHT: 68 IN | BODY MASS INDEX: 22.43 KG/M2 | OXYGEN SATURATION: 96 %

## 2018-06-19 DIAGNOSIS — E78.2 MIXED HYPERLIPIDEMIA: ICD-10-CM

## 2018-06-19 DIAGNOSIS — I47.10 PSVT (PAROXYSMAL SUPRAVENTRICULAR TACHYCARDIA): ICD-10-CM

## 2018-06-19 DIAGNOSIS — R55 SYNCOPE, UNSPECIFIED SYNCOPE TYPE: ICD-10-CM

## 2018-06-19 DIAGNOSIS — D68.2 FACTOR V DEFICIENCY (HCC): ICD-10-CM

## 2018-06-19 DIAGNOSIS — J44.9 CHRONIC OBSTRUCTIVE PULMONARY DISEASE, UNSPECIFIED COPD TYPE (HCC): ICD-10-CM

## 2018-06-19 DIAGNOSIS — Z79.01 CHRONIC ANTICOAGULATION: ICD-10-CM

## 2018-06-19 DIAGNOSIS — Z95.828 S/P INSERTION OF IVC (INFERIOR VENA CAVAL) FILTER: ICD-10-CM

## 2018-06-19 DIAGNOSIS — I25.10 CORONARY ARTERIOSCLEROSIS: ICD-10-CM

## 2018-06-19 PROCEDURE — 99214 OFFICE O/P EST MOD 30 MIN: CPT | Performed by: NURSE PRACTITIONER

## 2018-06-19 RX ORDER — NITROGLYCERIN 400 UG/1
1 SPRAY ORAL PRN
Qty: 1 BOTTLE | Refills: 11 | Status: SHIPPED | OUTPATIENT
Start: 2018-06-19 | End: 2018-06-20

## 2018-06-19 RX ORDER — ATORVASTATIN CALCIUM 80 MG/1
40 TABLET, FILM COATED ORAL
Qty: 90 TAB | Refills: 3 | COMMUNITY
Start: 2018-06-19 | End: 2019-06-06 | Stop reason: SDUPTHER

## 2018-06-19 NOTE — LETTER
[facility]   [address]  [place] [address]  Phone: [phone]  Fax: [phone]              Phyllis Perez  1960    Encounter Date: 6/19/2018    MAYE Stevens          PROGRESS NOTE:  Chief Complaint   Patient presents with   • Syncope     Subjective:   Phyllis Perez is a 57 y.o. female who presents today for follow up on near syncope and lightheadedness.    She is a patient of Dr. Butts in our office. Hx of CAD (will obtain past records-normal PET in '15), SSS with PPM, Factor V Leiden with IVC filter for hx of DVT/PE on coumadin, COPD on oxygen, HLD, and prior uterine cancer.    She states she continues to have palpitations alongside frequent lightheadedness.     She does have hypotension at times, she knows she can do better with hydration.    She states she seldom gets atypical chest pains that are sharp and pinpoint.    She is in the appointment with her dog and caregiver.    Past Medical History:   Diagnosis Date   • Anemia    • Anticoagulation monitoring, special range     coumadin therapy   • CAD (coronary artery disease)     Old MI/POBA   • Clotting disorder (HCC)     Factor V Leiden, IVC filter for hx of DVT, PE   • Emphysema (subcutaneous) (surgical) resulting from a procedure     asthma   • Hot flashes    • Hyperlipemia, mixed    • Old MI (myocardial infarction)     Treated with POBA; Cath without obstructive DZ 2009   • Ovarian cancer (HCC)    • Sleep apnea     CPAP   • SSS (sick sinus syndrome) (HCC)     Tx with pacer   • Uterine cancer (HCC)      Past Surgical History:   Procedure Laterality Date   • CERVICAL FUSION POSTERIOR  4/12/2017    Procedure: CERVICAL FUSION POSTERIOR- WITH INSTRUMENTATION C4-5, C5-6;  Surgeon: Alejandro Rayo M.D.;  Location: SURGERY [facility];  Service:    • PACEMAKER INSERTION  2010    bradycardia   • ANGIOPLASTY      ?2004   • CARDIAC CATH     • CHOLECYSTECTOMY     • ELBOW  EXPLORATION      cts   • HYSTERECTOMY LAPAROSCOPY     • KNEE REPLACEMENT, TOTAL     • KNEE REVISION TOTAL     • LUMBAR EXPLORATION      buck   • NASAL RECONSTRUCTION     • OOPHORECTOMY     • OTHER SURGICAL PROCEDURE      IVC filter placement   • SHOULDER ARTHROPLASTY TOTAL REVERSED       Family History   Problem Relation Age of Onset   • Cancer Mother    • Cancer Father    • Cancer Maternal Aunt    • Cancer Maternal Uncle      Social History     Social History   • Marital status:      Spouse name: N/A   • Number of children: N/A   • Years of education: N/A     Occupational History   • Not on file.     Social History Main Topics   • Smoking status: Former Smoker     Years: 15.00     Quit date: 7/10/2001   • Smokeless tobacco: Never Used   • Alcohol use Yes      Comment: Notes intermittent alcohol use   • Drug use: No   • Sexual activity: Not Currently     Partners: Male     Other Topics Concern   • Not on file     Social History Narrative   • No narrative on file     Allergies   Allergen Reactions   • Bactrim [Sulfamethoxazole W-Trimethoprim]      constipation   • Morphine      Severe HA   • Other Misc Hives     cloraprep   • Tape      Blistering and then pop     Outpatient Encounter Prescriptions as of 6/19/2018   Medication Sig Dispense Refill   • atorvastatin (LIPITOR) 80 MG tablet Take 0.5 Tabs by mouth every day. Take 80 mg once a day. 90 Tab 3   • nitroglycerin (NITROLINGUAL) 0.4 MG/SPRAY Solution Place 1 Spray under tongue as needed (up to 3 sprays every 5 minutes). 1 Bottle 11   • fludrocortisone (FLORINEF) 0.1 MG Tab TAKE 1 TABLET BY MOUTH EVERY DAY 90 Tab 3   • warfarin (COUMADIN) 3 MG Tab TAKE 1 TO 1 AND 1/2 TABLETS BY MOUTH EVERY DAY AS DIRECTED AT COUMADIN CLINIC 135 Tab 1   • ezetimibe (ZETIA) 10 MG Tab Take 1 Tab by mouth every day. 30 Tab 11   • potassium chloride ER (KLOR-CON) 10 MEQ tablet Take 1 Tab by mouth every day.  5   • ropinirole (REQUIP) 4 MG tablet Take 4 mg by mouth every evening.      • temazepam (RESTORIL) 30 MG capsule Take 1 Cap by mouth at bedtime as needed for Sleep. Fill at monthly intervals or greater. 30 Cap 0   • fentanyl (DURAGESIC) 100 MCG/HR PATCH 72 HR Apply 1 Patch to skin as directed every 72 hours.  0   • ADVAIR DISKUS 250-50 MCG/DOSE AEROSOL POWDER, BREATH ACTIVATED Inhale 1 Puff by mouth every day.  11   • hydrocodone/acetaminophen (NORCO)  MG Tab Take 1 tablet by mouth every 4 hours as needed for breakthrough pain. The earliest date the pharmacy may fill the rx is 7/18/2016. 180 Tab 0   • carisoprodol (SOMA) 350 MG Tab Take 1 Tab by mouth every 8 hours as needed for Muscle Spasms. Fill at monthly intervals or greater. 90 Tab 0   • aspirin (ASA) 81 MG Chew Tab chewable tablet Take 81 mg by mouth every day.     • albuterol (VENTOLIN OR PROVENTIL) 108 (90 BASE) MCG/ACT AERS inhalation aerosol Inhale 2 Puffs by mouth every 6 hours as needed for Shortness of Breath. 8.5 g 3   • fluticasone (FLONASE) 50 MCG/ACT nasal spray Spray 1 Spray in nose every day. 16 g 11   • ferrous sulfate 325 (65 FE) MG tablet Take 1 Tab by mouth every day. 30 Tab 3   • [DISCONTINUED] atorvastatin (LIPITOR) 40 MG Tab TAKE 1 TABLET BY MOUTH EVERY DAY 90 Tab 3   • enoxaparin (LOVENOX) 80 MG/0.8ML Solution inj Inject 80 mg as instructed every day. 10 Syringe 1   • Loratadine (CLARITIN) 10 MG CAPS Take 1 Cap by mouth every day. 30 Cap 11   • [DISCONTINUED] docusate sodium (COLACE) 100 MG CAPS Take 1 Cap by mouth 2 times a day. 60 Cap 5     No facility-administered encounter medications on file as of 6/19/2018.      Review of Systems   Constitutional: Negative for fever and malaise/fatigue.   Respiratory: Negative for cough and shortness of breath.    Cardiovascular: Positive for chest pain and palpitations. Negative for orthopnea, claudication, leg swelling and PND.        Pinpoint pains that happen at rest   Gastrointestinal: Negative for abdominal pain.   Musculoskeletal: Negative for myalgias.    "  Neurological: Positive for dizziness and weakness.        Lightheadedness        Objective:   BP (!) 92/60   Pulse 80   Ht 1.727 m (5' 8\")   Wt 67.1 kg (148 lb)   SpO2 96%   BMI 22.50 kg/m²      Physical Exam   Constitutional: She is oriented to person, place, and time. She appears well-developed. She appears ill.   HENT:   Head: Normocephalic and atraumatic.   Eyes: EOM are normal.   Neck: No JVD present.   Cardiovascular: Normal rate, regular rhythm, normal heart sounds and intact distal pulses.    Pulmonary/Chest: Effort normal and breath sounds normal.   Musculoskeletal: Normal range of motion. She exhibits no edema.   Neurological: She is alert and oriented to person, place, and time.   Skin: Skin is warm and dry.   Nursing note and vitals reviewed.      Assessment:     1. Coronary arteriosclerosis  atorvastatin (LIPITOR) 80 MG tablet   2. Mixed hyperlipidemia  atorvastatin (LIPITOR) 80 MG tablet    LIPID PANEL    COMP METABOLIC PANEL   3. PSVT (paroxysmal supraventricular tachycardia) (HCC)  REFERRAL TO CARDIAC ELECTROPHYSIOLOGY   4. Syncope, unspecified syncope type     5. Factor V deficiency (CMS-HCC): With history of DVT and pulmonary emboli     6. S/P insertion of IVC (inferior vena caval) filter     7. Chronic anticoagulation     8. Chronic obstructive pulmonary disease, unspecified COPD type ([facility])       Medical Decision Making:  Today's Assessment / Status / Plan:     1. CAD with prior MI  -need to obtain records from prior cath  -atypical chest pain  -last cardiac PET was in '15 negative for ischemia  -cont asa, statin    2. HLD  -statin  -LDL not at goal  -increase liipitor to 80 mg QPM  -repeat CMP and lipid in 6 weeks    3. PSVT with SSS and PPM placement  -near syncope and concern for PSVT causing symptoms  -refer to EP for holter eval and pacer check    4. Factor V Leiden with prior hx of DVT/PE and IVC filter  -coumadin with coumadin clinic    5. COPD  -managed with oxygen    6. Hypotension " with near syncope  -cont hydration  -cont florinef, increase if EP thinks near syncope is not rhythm related    FU in clinic in 3 months with EP; labs prior; call for worsening symptoms    Patient verbalizes understanding and agrees with the plan of care.     Collaborating MD: Beckie OLIVARES        No Recipients

## 2018-06-20 DIAGNOSIS — I25.10 CORONARY ARTERIOSCLEROSIS: ICD-10-CM

## 2018-06-20 RX ORDER — NITROGLYCERIN 0.4 MG/1
0.4 TABLET SUBLINGUAL PRN
Qty: 25 TAB | Refills: 2 | Status: SHIPPED | OUTPATIENT
Start: 2018-06-20 | End: 2020-03-10

## 2018-06-20 ASSESSMENT — ENCOUNTER SYMPTOMS
ORTHOPNEA: 0
DIZZINESS: 1
WEAKNESS: 1
FEVER: 0
SHORTNESS OF BREATH: 0
ABDOMINAL PAIN: 0
CLAUDICATION: 0
COUGH: 0
MYALGIAS: 0
PALPITATIONS: 1
PND: 0

## 2018-06-20 NOTE — PROGRESS NOTES
Chief Complaint   Patient presents with   • Syncope     Subjective:   Phyllis Perez is a 57 y.o. female who presents today for follow up on near syncope and lightheadedness.    She is a patient of Dr. Butts in our office. Hx of CAD (will obtain past records-normal PET in '15), SSS with PPM, Factor V Leiden with IVC filter for hx of DVT/PE on coumadin, COPD on oxygen, HLD, and prior uterine cancer.    She states she continues to have palpitations alongside frequent lightheadedness.     She does have hypotension at times, she knows she can do better with hydration.    She states she seldom gets atypical chest pains that are sharp and pinpoint.    She is in the appointment with her dog and caregiver.    Past Medical History:   Diagnosis Date   • Anemia    • Anticoagulation monitoring, special range     coumadin therapy   • CAD (coronary artery disease)     Old MI/POBA   • Clotting disorder (HCC)     Factor V Leiden, IVC filter for hx of DVT, PE   • Emphysema (subcutaneous) (surgical) resulting from a procedure     asthma   • Hot flashes    • Hyperlipemia, mixed    • Old MI (myocardial infarction)     Treated with POBA; Cath without obstructive DZ 2009   • Ovarian cancer (HCC)    • Sleep apnea     CPAP   • SSS (sick sinus syndrome) (HCC)     Tx with pacer   • Uterine cancer (HCC)      Past Surgical History:   Procedure Laterality Date   • CERVICAL FUSION POSTERIOR  4/12/2017    Procedure: CERVICAL FUSION POSTERIOR- WITH INSTRUMENTATION C4-5, C5-6;  Surgeon: Alejandro Rayo M.D.;  Location: SURGERY [facility];  Service:    • PACEMAKER INSERTION  2010    bradycardia   • ANGIOPLASTY      ?2004   • CARDIAC CATH     • CHOLECYSTECTOMY     • ELBOW EXPLORATION      cts   • HYSTERECTOMY LAPAROSCOPY     • KNEE REPLACEMENT, TOTAL     • KNEE REVISION TOTAL     • LUMBAR EXPLORATION      buck   • NASAL RECONSTRUCTION     • OOPHORECTOMY     • OTHER SURGICAL PROCEDURE      IVC filter placement   • SHOULDER ARTHROPLASTY TOTAL  REVERSED       Family History   Problem Relation Age of Onset   • Cancer Mother    • Cancer Father    • Cancer Maternal Aunt    • Cancer Maternal Uncle      Social History     Social History   • Marital status:      Spouse name: N/A   • Number of children: N/A   • Years of education: N/A     Occupational History   • Not on file.     Social History Main Topics   • Smoking status: Former Smoker     Years: 15.00     Quit date: 7/10/2001   • Smokeless tobacco: Never Used   • Alcohol use Yes      Comment: Notes intermittent alcohol use   • Drug use: No   • Sexual activity: Not Currently     Partners: Male     Other Topics Concern   • Not on file     Social History Narrative   • No narrative on file     Allergies   Allergen Reactions   • Bactrim [Sulfamethoxazole W-Trimethoprim]      constipation   • Morphine      Severe HA   • Other Misc Hives     cloraprep   • Tape      Blistering and then pop     Outpatient Encounter Prescriptions as of 6/19/2018   Medication Sig Dispense Refill   • atorvastatin (LIPITOR) 80 MG tablet Take 0.5 Tabs by mouth every day. Take 80 mg once a day. 90 Tab 3   • nitroglycerin (NITROLINGUAL) 0.4 MG/SPRAY Solution Place 1 Spray under tongue as needed (up to 3 sprays every 5 minutes). 1 Bottle 11   • fludrocortisone (FLORINEF) 0.1 MG Tab TAKE 1 TABLET BY MOUTH EVERY DAY 90 Tab 3   • warfarin (COUMADIN) 3 MG Tab TAKE 1 TO 1 AND 1/2 TABLETS BY MOUTH EVERY DAY AS DIRECTED AT COUMADIN CLINIC 135 Tab 1   • ezetimibe (ZETIA) 10 MG Tab Take 1 Tab by mouth every day. 30 Tab 11   • potassium chloride ER (KLOR-CON) 10 MEQ tablet Take 1 Tab by mouth every day.  5   • ropinirole (REQUIP) 4 MG tablet Take 4 mg by mouth every evening.     • temazepam (RESTORIL) 30 MG capsule Take 1 Cap by mouth at bedtime as needed for Sleep. Fill at monthly intervals or greater. 30 Cap 0   • fentanyl (DURAGESIC) 100 MCG/HR PATCH 72 HR Apply 1 Patch to skin as directed every 72 hours.  0   • ADVAIR DISKUS 250-50  "MCG/DOSE AEROSOL POWDER, BREATH ACTIVATED Inhale 1 Puff by mouth every day.  11   • hydrocodone/acetaminophen (NORCO)  MG Tab Take 1 tablet by mouth every 4 hours as needed for breakthrough pain. The earliest date the pharmacy may fill the rx is 7/18/2016. 180 Tab 0   • carisoprodol (SOMA) 350 MG Tab Take 1 Tab by mouth every 8 hours as needed for Muscle Spasms. Fill at monthly intervals or greater. 90 Tab 0   • aspirin (ASA) 81 MG Chew Tab chewable tablet Take 81 mg by mouth every day.     • albuterol (VENTOLIN OR PROVENTIL) 108 (90 BASE) MCG/ACT AERS inhalation aerosol Inhale 2 Puffs by mouth every 6 hours as needed for Shortness of Breath. 8.5 g 3   • fluticasone (FLONASE) 50 MCG/ACT nasal spray Spray 1 Spray in nose every day. 16 g 11   • ferrous sulfate 325 (65 FE) MG tablet Take 1 Tab by mouth every day. 30 Tab 3   • [DISCONTINUED] atorvastatin (LIPITOR) 40 MG Tab TAKE 1 TABLET BY MOUTH EVERY DAY 90 Tab 3   • enoxaparin (LOVENOX) 80 MG/0.8ML Solution inj Inject 80 mg as instructed every day. 10 Syringe 1   • Loratadine (CLARITIN) 10 MG CAPS Take 1 Cap by mouth every day. 30 Cap 11   • [DISCONTINUED] docusate sodium (COLACE) 100 MG CAPS Take 1 Cap by mouth 2 times a day. 60 Cap 5     No facility-administered encounter medications on file as of 6/19/2018.      Review of Systems   Constitutional: Negative for fever and malaise/fatigue.   Respiratory: Negative for cough and shortness of breath.    Cardiovascular: Positive for chest pain and palpitations. Negative for orthopnea, claudication, leg swelling and PND.        Pinpoint pains that happen at rest   Gastrointestinal: Negative for abdominal pain.   Musculoskeletal: Negative for myalgias.   Neurological: Positive for dizziness and weakness.        Lightheadedness        Objective:   BP (!) 92/60   Pulse 80   Ht 1.727 m (5' 8\")   Wt 67.1 kg (148 lb)   SpO2 96%   BMI 22.50 kg/m²     Physical Exam   Constitutional: She is oriented to person, place, " and time. She appears well-developed. She appears ill.   HENT:   Head: Normocephalic and atraumatic.   Eyes: EOM are normal.   Neck: No JVD present.   Cardiovascular: Normal rate, regular rhythm, normal heart sounds and intact distal pulses.    Pulmonary/Chest: Effort normal and breath sounds normal.   Musculoskeletal: Normal range of motion. She exhibits no edema.   Neurological: She is alert and oriented to person, place, and time.   Skin: Skin is warm and dry.   Nursing note and vitals reviewed.      Assessment:     1. Coronary arteriosclerosis  atorvastatin (LIPITOR) 80 MG tablet   2. Mixed hyperlipidemia  atorvastatin (LIPITOR) 80 MG tablet    LIPID PANEL    COMP METABOLIC PANEL   3. PSVT (paroxysmal supraventricular tachycardia) ([facility])  REFERRAL TO CARDIAC ELECTROPHYSIOLOGY   4. Syncope, unspecified syncope type     5. Factor V deficiency (CMS-HCC): With history of DVT and pulmonary emboli     6. S/P insertion of IVC (inferior vena caval) filter     7. Chronic anticoagulation     8. Chronic obstructive pulmonary disease, unspecified COPD type ([facility])       Medical Decision Making:  Today's Assessment / Status / Plan:     1. CAD with prior MI  -need to obtain records from prior cath  -atypical chest pain  -last cardiac PET was in '15 negative for ischemia  -cont asa, statin    2. HLD  -statin  -LDL not at goal  -increase liipitor to 80 mg QPM  -repeat CMP and lipid in 6 weeks    3. PSVT with SSS and PPM placement  -near syncope and concern for PSVT causing symptoms  -refer to EP for holter eval and pacer check    4. Factor V Leiden with prior hx of DVT/PE and IVC filter  -coumadin with coumadin clinic    5. COPD  -managed with oxygen    6. Hypotension with near syncope  -cont hydration  -cont florinef, increase if EP thinks near syncope is not rhythm related    FU in clinic in 3 months with EP; labs prior; call for worsening symptoms    Patient verbalizes understanding and agrees with the plan of care.      Collaborating MD: Beckie OLIVARES

## 2018-07-12 ENCOUNTER — ANTICOAGULATION MONITORING (OUTPATIENT)
Dept: VASCULAR LAB | Facility: MEDICAL CENTER | Age: 58
End: 2018-07-12

## 2018-07-12 LAB — INR PPP: 2.3 (ref 2–3.5)

## 2018-07-12 NOTE — PROGRESS NOTES
Anticoagulation Summary  As of 7/12/2018    INR goal:   2.5-3.5   TTR:   48.6 % (2.9 y)   Today's INR:   2.3!   Warfarin maintenance plan:   4.5 mg (3 mg x 1.5) on Mon, Wed, Fri; 3 mg (3 mg x 1) all other days   Weekly warfarin total:   25.5 mg   Plan last modified:   Deacon Novak, PharmMIESHA (8/15/2016)   Next INR check:   7/19/2018   Target end date:   Indefinite    Indications    Factor V Leiden (HCC) (Resolved) [D68.51]  Deep vein thrombosis [453.40] (Resolved) [I82.409]  Pulmonary embolism [415.19] (Resolved) [I26.99]             Anticoagulation Episode Summary     INR check location:   [facility]    Preferred lab:       Send INR reminders to:       Comments:   PATIENT SELF MONITORING      Anticoagulation Care Providers     Provider Role Specialty Phone number    DERICK Diehl Referring Family Medicine [phone]    Eliseo Eduardo M.D. Responsible Cardiology [phone]    Deacon Novak, PharmD Responsible          Anticoagulation Patient Findings    Pt is enrolled in self management course.  She should be increasing her dose today and then checking next week.    Russell Fraire, RoxieD

## 2018-07-13 ENCOUNTER — TELEPHONE (OUTPATIENT)
Dept: CARDIOLOGY | Facility: MEDICAL CENTER | Age: 58
End: 2018-07-13

## 2018-07-13 NOTE — TELEPHONE ENCOUNTER
"pt upset no one has called her back yet about her pacemaker   Received: Today   Message Contents   Catrina CARDENASPratima Yañezjade  LIZZIE Michelle/Eyal Scott (caregiver) is calling on behlaf of the patient who states she's having issues with her pacemaker and someone from our office was suppose to call her to get her set up with electronic home monitoring. Ph. #[phone].      I forwarded this message to Paz in pacemaker clinic to address home monitoring issue. Per Paz she s/w the patient and her pacemaker is not set up for home monitoring and pt is mainly concerned about needing a follow up w/ EP. Currently she is scheduled 9/25 w. Dr. Leroy.     S/w pt discussed concerns. She said she needs EP to look at why she is going \"sinus layla and sinus tach\" w/ PPM. She explained that she has hx of near syncope and dizziness but tolerates it fine because she is used to it. Offered pt a sooner apt with dr. Everett on 8/02. Pt agreed and appreciated.       "

## 2018-07-26 ENCOUNTER — TELEPHONE (OUTPATIENT)
Dept: VASCULAR LAB | Facility: MEDICAL CENTER | Age: 58
End: 2018-07-26

## 2018-08-02 ENCOUNTER — OFFICE VISIT (OUTPATIENT)
Dept: CARDIOLOGY | Facility: MEDICAL CENTER | Age: 58
End: 2018-08-02
Payer: MEDICARE

## 2018-08-02 ENCOUNTER — TELEPHONE (OUTPATIENT)
Dept: CARDIOLOGY | Facility: MEDICAL CENTER | Age: 58
End: 2018-08-02

## 2018-08-02 VITALS
WEIGHT: 154 LBS | BODY MASS INDEX: 23.34 KG/M2 | HEIGHT: 68 IN | DIASTOLIC BLOOD PRESSURE: 70 MMHG | SYSTOLIC BLOOD PRESSURE: 102 MMHG | HEART RATE: 110 BPM | OXYGEN SATURATION: 94 %

## 2018-08-02 DIAGNOSIS — Z95.0 CARDIAC PACEMAKER IN SITU: ICD-10-CM

## 2018-08-02 DIAGNOSIS — Z79.01 CHRONIC ANTICOAGULATION: ICD-10-CM

## 2018-08-02 DIAGNOSIS — Z95.828 S/P INSERTION OF IVC (INFERIOR VENA CAVAL) FILTER: ICD-10-CM

## 2018-08-02 DIAGNOSIS — R55 SYNCOPE, UNSPECIFIED SYNCOPE TYPE: ICD-10-CM

## 2018-08-02 DIAGNOSIS — J44.9 CHRONIC OBSTRUCTIVE PULMONARY DISEASE, UNSPECIFIED COPD TYPE (HCC): ICD-10-CM

## 2018-08-02 DIAGNOSIS — I49.5 SICK SINUS SYNDROME (HCC): ICD-10-CM

## 2018-08-02 DIAGNOSIS — D68.2 FACTOR V DEFICIENCY (HCC): ICD-10-CM

## 2018-08-02 PROBLEM — I47.10 PSVT (PAROXYSMAL SUPRAVENTRICULAR TACHYCARDIA) (HCC): Status: RESOLVED | Noted: 2018-06-19 | Resolved: 2018-08-02

## 2018-08-02 PROCEDURE — 99204 OFFICE O/P NEW MOD 45 MIN: CPT | Performed by: INTERNAL MEDICINE

## 2018-08-02 ASSESSMENT — ENCOUNTER SYMPTOMS
DEPRESSION: 0
NAUSEA: 0
BLURRED VISION: 0
HEMOPTYSIS: 0
FEVER: 0
EYE PAIN: 0
CHILLS: 0
LOSS OF CONSCIOUSNESS: 1
VOMITING: 0
ABDOMINAL PAIN: 0
COUGH: 0
BRUISES/BLEEDS EASILY: 0
SPEECH CHANGE: 0
WHEEZING: 0
MYALGIAS: 0
PALPITATIONS: 0
NERVOUS/ANXIOUS: 0
WEAKNESS: 1
EYE DISCHARGE: 0

## 2018-08-02 NOTE — PROGRESS NOTES
Chief Complaint   Patient presents with   • Syncope     PP DX:SYNCOPE       Subjective:   Phyllis Perez is a 57 y.o. female who presents today being seen in consult on request of Chastity Pickett secondary to PPM and question abnormal function. Recurrent syncope for years always on standing. On significant hydration and florinef. PPM placed in [place] for SB in 2011. COPD on O2. Zio reviewed with nl PPM function. Has not had a LEONEL.     Past Medical History:   Diagnosis Date   • Anemia    • Anticoagulation monitoring, special range     coumadin therapy   • CAD (coronary artery disease)     Old MI/POBA   • Clotting disorder (HCC)     Factor V Leiden, IVC filter for hx of DVT, PE   • Emphysema (subcutaneous) (surgical) resulting from a procedure     asthma   • Hot flashes    • Hyperlipemia, mixed    • Old MI (myocardial infarction)     Treated with POBA; Cath without obstructive DZ 2009   • Ovarian cancer (HCC)    • Sleep apnea     CPAP   • SSS (sick sinus syndrome) (HCC)     Tx with pacer   • Uterine cancer (HCC)      Past Surgical History:   Procedure Laterality Date   • CERVICAL FUSION POSTERIOR  4/12/2017    Procedure: CERVICAL FUSION POSTERIOR- WITH INSTRUMENTATION C4-5, C5-6;  Surgeon: Alejandro Rayo M.D.;  Location: SURGERY [facility];  Service:    • PACEMAKER INSERTION  2010    bradycardia   • ANGIOPLASTY      ?2004   • CARDIAC CATH     • CHOLECYSTECTOMY     • ELBOW EXPLORATION      cts   • HYSTERECTOMY LAPAROSCOPY     • KNEE REPLACEMENT, TOTAL     • KNEE REVISION TOTAL     • LUMBAR EXPLORATION      buck   • NASAL RECONSTRUCTION     • OOPHORECTOMY     • OTHER SURGICAL PROCEDURE      IVC filter placement   • SHOULDER ARTHROPLASTY TOTAL REVERSED       Family History   Problem Relation Age of Onset   • Cancer Mother    • Cancer Father    • Cancer Maternal Aunt    • Cancer Maternal Uncle      Social History     Social History   • Marital status:      Spouse name: N/A   • Number of children:  N/A   • Years of education: N/A     Occupational History   • Not on file.     Social History Main Topics   • Smoking status: Former Smoker     Years: 15.00     Quit date: 7/10/2001   • Smokeless tobacco: Never Used   • Alcohol use Yes      Comment: Notes intermittent alcohol use   • Drug use: No   • Sexual activity: Not Currently     Partners: Male     Other Topics Concern   • Not on file     Social History Narrative   • No narrative on file     Allergies   Allergen Reactions   • Bactrim [Sulfamethoxazole W-Trimethoprim]      constipation   • Morphine      Severe HA   • Other Misc Hives     cloraprep   • Tape      Blistering and then pop     Outpatient Encounter Prescriptions as of 8/2/2018   Medication Sig Dispense Refill   • nitroglycerin (NITROSTAT) 0.4 MG SL Tab Place 1 Tab under tongue as needed for Chest Pain (As needed for chest pain). Up to 3 tablets every 5 minutes 25 Tab 2   • atorvastatin (LIPITOR) 80 MG tablet Take 0.5 Tabs by mouth every day. Take 80 mg once a day. 90 Tab 3   • fludrocortisone (FLORINEF) 0.1 MG Tab TAKE 1 TABLET BY MOUTH EVERY DAY 90 Tab 3   • warfarin (COUMADIN) 3 MG Tab TAKE 1 TO 1 AND 1/2 TABLETS BY MOUTH EVERY DAY AS DIRECTED AT COUMADIN CLINIC 135 Tab 1   • ezetimibe (ZETIA) 10 MG Tab Take 1 Tab by mouth every day. 30 Tab 11   • potassium chloride ER (KLOR-CON) 10 MEQ tablet Take 1 Tab by mouth every day.  5   • ropinirole (REQUIP) 4 MG tablet Take 4 mg by mouth every evening.     • temazepam (RESTORIL) 30 MG capsule Take 1 Cap by mouth at bedtime as needed for Sleep. Fill at monthly intervals or greater. 30 Cap 0   • fentanyl (DURAGESIC) 100 MCG/HR PATCH 72 HR Apply 1 Patch to skin as directed every 72 hours.  0   • ADVAIR DISKUS 250-50 MCG/DOSE AEROSOL POWDER, BREATH ACTIVATED Inhale 1 Puff by mouth every day.  11   • hydrocodone/acetaminophen (NORCO)  MG Tab Take 1 tablet by mouth every 4 hours as needed for breakthrough pain. The earliest date the pharmacy may fill the rx  "is 7/18/2016. 180 Tab 0   • carisoprodol (SOMA) 350 MG Tab Take 1 Tab by mouth every 8 hours as needed for Muscle Spasms. Fill at monthly intervals or greater. 90 Tab 0   • aspirin (ASA) 81 MG Chew Tab chewable tablet Take 81 mg by mouth every day.     • albuterol (VENTOLIN OR PROVENTIL) 108 (90 BASE) MCG/ACT AERS inhalation aerosol Inhale 2 Puffs by mouth every 6 hours as needed for Shortness of Breath. 8.5 g 3   • fluticasone (FLONASE) 50 MCG/ACT nasal spray Spray 1 Spray in nose every day. 16 g 11   • ferrous sulfate 325 (65 FE) MG tablet Take 1 Tab by mouth every day. 30 Tab 3   • enoxaparin (LOVENOX) 80 MG/0.8ML Solution inj Inject 80 mg as instructed every day. 10 Syringe 1   • Loratadine (CLARITIN) 10 MG CAPS Take 1 Cap by mouth every day. 30 Cap 11     No facility-administered encounter medications on file as of 8/2/2018.      Review of Systems   Constitutional: Positive for malaise/fatigue. Negative for chills and fever.   HENT: Negative for congestion.    Eyes: Negative for blurred vision, pain and discharge.   Respiratory: Negative for cough, hemoptysis and wheezing.    Cardiovascular: Negative for chest pain and palpitations.   Gastrointestinal: Negative for abdominal pain, nausea and vomiting.   Musculoskeletal: Negative for joint pain and myalgias.   Skin: Negative for itching and rash.   Neurological: Positive for loss of consciousness and weakness. Negative for speech change.   Endo/Heme/Allergies: Does not bruise/bleed easily.   Psychiatric/Behavioral: Negative for depression. The patient is not nervous/anxious.    All other systems reviewed and are negative.       Objective:   /70   Pulse (!) 110   Ht 1.727 m (5' 7.99\")   Wt 69.9 kg (154 lb)   SpO2 94%   BMI 23.42 kg/m²     Physical Exam   Constitutional: She is oriented to person, place, and time. She appears well-developed and well-nourished.   HENT:   Head: Normocephalic and atraumatic.   Eyes: Pupils are equal, round, and reactive to " light. EOM are normal.   Neck: Normal range of motion. Neck supple.   Cardiovascular: Normal rate, regular rhythm, normal heart sounds and intact distal pulses.  Exam reveals no gallop and no friction rub.    No murmur heard.  Pulmonary/Chest: Effort normal and breath sounds normal.   Abdominal: Soft. Bowel sounds are normal.   Musculoskeletal: Normal range of motion. She exhibits no edema.   Neurological: She is alert and oriented to person, place, and time.   Skin: Skin is warm.   Psychiatric: She has a normal mood and affect. Her behavior is normal. Judgment and thought content normal.       Assessment:     1. Syncope, unspecified syncope type     2. 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 Chronic anticoagulation     4. Sick sinus syndrome ([facility])     5. S/P insertion of IVC (inferior vena caval) filter     6. Cardiac pacemaker in situ     7. Chronic obstructive pulmonary disease, unspecified COPD type ([facility])         Medical Decision Making:  Today's Assessment / Status / Plan:   1. Recurrent syncopal spell with probable vasodepressor syncope vs autonomic dysfunction. Check LEONEL. May benefit from midodrine.  2. Continue hydration.  3. Consider start using compression stocking.  4. PPM nl function without abnormality.

## 2018-08-02 NOTE — LETTER
[facility]   [address], [address]  [place] [address]  Phone: [phone]  Fax: [phone]              Phyllis Perez  1960    Encounter Date: 8/2/2018    Abel Everett M.D.          PROGRESS NOTE:  Chief Complaint   Patient presents with   • Syncope     PP DX:SYNCOPE       Subjective:   Phyllis Perez is a 57 y.o. female who presents today being seen in consult on request of Chastity Pickett secondary to PPM and question abnormal function. Recurrent syncope for years always on standing. On significant hydration and florinef. PPM placed in [place] for SB in 2011. COPD on O2. Zio reviewed with nl PPM function. Has not had a LEONEL.     Past Medical History:   Diagnosis Date   • Anemia    • Anticoagulation monitoring, special range     coumadin therapy   • CAD (coronary artery disease)     Old MI/POBA   • Clotting disorder (HCC)     Factor V Leiden, IVC filter for hx of DVT, PE   • Emphysema (subcutaneous) (surgical) resulting from a procedure     asthma   • Hot flashes    • Hyperlipemia, mixed    • Old MI (myocardial infarction)     Treated with POBA; Cath without obstructive DZ 2009   • Ovarian cancer (HCC)    • Sleep apnea     CPAP   • SSS (sick sinus syndrome) (HCC)     Tx with pacer   • Uterine cancer (HCC)      Past Surgical History:   Procedure Laterality Date   • CERVICAL FUSION POSTERIOR  4/12/2017    Procedure: CERVICAL FUSION POSTERIOR- WITH INSTRUMENTATION C4-5, C5-6;  Surgeon: Alejandro Rayo M.D.;  Location: SURGERY [facility];  Service:    • PACEMAKER INSERTION  2010    bradycardia   • ANGIOPLASTY      ?2004   • CARDIAC CATH     • CHOLECYSTECTOMY     • ELBOW EXPLORATION      cts   • HYSTERECTOMY LAPAROSCOPY     • KNEE REPLACEMENT, TOTAL     • KNEE REVISION TOTAL     • LUMBAR EXPLORATION      buck   • NASAL RECONSTRUCTION     • OOPHORECTOMY     • OTHER SURGICAL PROCEDURE      IVC filter placement   • SHOULDER ARTHROPLASTY TOTAL REVERSED        Family History   Problem Relation Age of Onset   • Cancer Mother    • Cancer Father    • Cancer Maternal Aunt    • Cancer Maternal Uncle      Social History     Social History   • Marital status:      Spouse name: N/A   • Number of children: N/A   • Years of education: N/A     Occupational History   • Not on file.     Social History Main Topics   • Smoking status: Former Smoker     Years: 15.00     Quit date: 7/10/2001   • Smokeless tobacco: Never Used   • Alcohol use Yes      Comment: Notes intermittent alcohol use   • Drug use: No   • Sexual activity: Not Currently     Partners: Male     Other Topics Concern   • Not on file     Social History Narrative   • No narrative on file     Allergies   Allergen Reactions   • Bactrim [Sulfamethoxazole W-Trimethoprim]      constipation   • Morphine      Severe HA   • Other Misc Hives     cloraprep   • Tape      Blistering and then pop     Outpatient Encounter Prescriptions as of 8/2/2018   Medication Sig Dispense Refill   • nitroglycerin (NITROSTAT) 0.4 MG SL Tab Place 1 Tab under tongue as needed for Chest Pain (As needed for chest pain). Up to 3 tablets every 5 minutes 25 Tab 2   • atorvastatin (LIPITOR) 80 MG tablet Take 0.5 Tabs by mouth every day. Take 80 mg once a day. 90 Tab 3   • fludrocortisone (FLORINEF) 0.1 MG Tab TAKE 1 TABLET BY MOUTH EVERY DAY 90 Tab 3   • warfarin (COUMADIN) 3 MG Tab TAKE 1 TO 1 AND 1/2 TABLETS BY MOUTH EVERY DAY AS DIRECTED AT COUMADIN CLINIC 135 Tab 1   • ezetimibe (ZETIA) 10 MG Tab Take 1 Tab by mouth every day. 30 Tab 11   • potassium chloride ER (KLOR-CON) 10 MEQ tablet Take 1 Tab by mouth every day.  5   • ropinirole (REQUIP) 4 MG tablet Take 4 mg by mouth every evening.     • temazepam (RESTORIL) 30 MG capsule Take 1 Cap by mouth at bedtime as needed for Sleep. Fill at monthly intervals or greater. 30 Cap 0   • fentanyl (DURAGESIC) 100 MCG/HR PATCH 72 HR Apply 1 Patch to skin as directed every 72 hours.  0   • ADVAIR DISKUS  "250-50 MCG/DOSE AEROSOL POWDER, BREATH ACTIVATED Inhale 1 Puff by mouth every day.  11   • hydrocodone/acetaminophen (NORCO)  MG Tab Take 1 tablet by mouth every 4 hours as needed for breakthrough pain. The earliest date the pharmacy may fill the rx is 7/18/2016. 180 Tab 0   • carisoprodol (SOMA) 350 MG Tab Take 1 Tab by mouth every 8 hours as needed for Muscle Spasms. Fill at monthly intervals or greater. 90 Tab 0   • aspirin (ASA) 81 MG Chew Tab chewable tablet Take 81 mg by mouth every day.     • albuterol (VENTOLIN OR PROVENTIL) 108 (90 BASE) MCG/ACT AERS inhalation aerosol Inhale 2 Puffs by mouth every 6 hours as needed for Shortness of Breath. 8.5 g 3   • fluticasone (FLONASE) 50 MCG/ACT nasal spray Spray 1 Spray in nose every day. 16 g 11   • ferrous sulfate 325 (65 FE) MG tablet Take 1 Tab by mouth every day. 30 Tab 3   • enoxaparin (LOVENOX) 80 MG/0.8ML Solution inj Inject 80 mg as instructed every day. 10 Syringe 1   • Loratadine (CLARITIN) 10 MG CAPS Take 1 Cap by mouth every day. 30 Cap 11     No facility-administered encounter medications on file as of 8/2/2018.      Review of Systems   Constitutional: Positive for malaise/fatigue. Negative for chills and fever.   HENT: Negative for congestion.    Eyes: Negative for blurred vision, pain and discharge.   Respiratory: Negative for cough, hemoptysis and wheezing.    Cardiovascular: Negative for chest pain and palpitations.   Gastrointestinal: Negative for abdominal pain, nausea and vomiting.   Musculoskeletal: Negative for joint pain and myalgias.   Skin: Negative for itching and rash.   Neurological: Positive for loss of consciousness and weakness. Negative for speech change.   Endo/Heme/Allergies: Does not bruise/bleed easily.   Psychiatric/Behavioral: Negative for depression. The patient is not nervous/anxious.    All other systems reviewed and are negative.       Objective:   /70   Pulse (!) 110   Ht 1.727 m (5' 7.99\")   Wt 69.9 kg (154 " lb)   SpO2 94%   BMI 23.42 kg/m²      Physical Exam   Constitutional: She is oriented to person, place, and time. She appears well-developed and well-nourished.   HENT:   Head: Normocephalic and atraumatic.   Eyes: Pupils are equal, round, and reactive to light. EOM are normal.   Neck: Normal range of motion. Neck supple.   Cardiovascular: Normal rate, regular rhythm, normal heart sounds and intact distal pulses.  Exam reveals no gallop and no friction rub.    No murmur heard.  Pulmonary/Chest: Effort normal and breath sounds normal.   Abdominal: Soft. Bowel sounds are normal.   Musculoskeletal: Normal range of motion. She exhibits no edema.   Neurological: She is alert and oriented to person, place, and time.   Skin: Skin is warm.   Psychiatric: She has a normal mood and affect. Her behavior is normal. Judgment and thought content normal.       Assessment:     1. Syncope, unspecified syncope type     2. 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 Chronic anticoagulation     4. Sick sinus syndrome (HCC)     5. S/P insertion of IVC (inferior vena caval) filter     6. Cardiac pacemaker in situ     7. Chronic obstructive pulmonary disease, unspecified COPD type ([facility])         Medical Decision Making:  Today's Assessment / Status / Plan:   1. Recurrent syncopal spell with probable vasodepressor syncope vs autonomic dysfunction. Check LEONEL. May benefit from midodrine.  2. Continue hydration.  3. Consider start using compression stocking.  4. PPM nl function without abnormality.      Polina Zamora M.D.  [address] [facility] [address]  VIA Facsimile: [phone]     [facility]  [address]  [address]  [place] [address]  VIA In Basket

## 2018-08-02 NOTE — TELEPHONE ENCOUNTER
Nitro Tilt Table ordered. Orders faxed to cath lab scheduling. They will call the patient to schedule.

## 2018-08-07 ENCOUNTER — TELEPHONE (OUTPATIENT)
Dept: VASCULAR LAB | Facility: MEDICAL CENTER | Age: 58
End: 2018-08-07

## 2018-08-07 NOTE — TELEPHONE ENCOUNTER
OP Anticoagulation Telephone Note    Date: 8/7/2018       Plan:  Called to remind patient to get PT/INR lab done.  Left message with pts significant other for her to have INR checked ASAP. She is current in [place] and will be back soon.     Carmencita Gandhi, Pharm D

## 2018-08-21 ENCOUNTER — TELEPHONE (OUTPATIENT)
Dept: VASCULAR LAB | Facility: MEDICAL CENTER | Age: 58
End: 2018-08-21

## 2018-08-21 NOTE — TELEPHONE ENCOUNTER
OP Anticoagulation Telephone Note    Date: 8/21/2018       Plan:  Called to remind patient to get PT/INR lab done.  Left VM asking pt to check INR NEHEMIAH Gandhi, Roxie D

## 2018-09-12 ENCOUNTER — HOSPITAL ENCOUNTER (OUTPATIENT)
Dept: CARDIOLOGY | Facility: MEDICAL CENTER | Age: 58
End: 2018-09-12
Attending: INTERNAL MEDICINE
Payer: MEDICARE

## 2018-09-12 PROCEDURE — A9270 NON-COVERED ITEM OR SERVICE: HCPCS

## 2018-09-12 PROCEDURE — 700102 HCHG RX REV CODE 250 W/ 637 OVERRIDE(OP)

## 2018-09-12 RX ORDER — NITROGLYCERIN 0.4 MG/1
TABLET SUBLINGUAL
Status: DISPENSED
Start: 2018-09-12 | End: 2018-09-12

## 2018-09-14 NOTE — PROCEDURES
DATE OF SERVICE:  09/12/2018    TILT TABLE TEST    INDICATIONS:  Dizziness.    BASELINE ECG:  Sinus rhythm, incomplete right bundle-branch block, nonspecific   ST changes.  Normal intervals.    PROCEDURE DATA:  Standard passive tilt table test was done.  The patient's   resting blood pressure is 114/79.  Resting heart rate was 68 beats a minute.    The patient had infrequent PVCs and PACs.  At minute 20, her blood pressure   was still normal.  Heart rate had elevated to 119 with sinus tachycardia.    Nitroglycerine was given at minute 20.  By minute 24, her heart rate was   sinus, but 144 beats a minute.  Her blood pressure was 71/38.  She complained   of chest discomfort, but no fainting up until 30 minutes.  Her blood pressure   upon going flat recovered to 95/64, heart rate eventually came down to 127 and   then to normal resting levels.  No other complications.  Recovery was   uneventful.    IMPRESSION:  1.  Symptomatic bradycardia and sinus tachycardia with tilt table.  No syncope   appreciated.  2.  Above findings are likely compatible with vasovagal reaction.       ____________________________________     MD JOSEPH STINSON / SHRUTHI    DD:  09/14/2018 13:17:48  DT:  09/14/2018 13:39:36    D#:  [id number]  Job#:  [id number]

## 2018-09-14 NOTE — PROCEDURES
DATE OF SERVICE:  09/12/2018    TILT TABLE TEST    DATE OF STUDY:  09/12/2018    INDICATIONS:  Dizziness.    BASELINE ECG:  Sinus rhythm, incomplete right bundle-branch block, nonspecific   ST changes.  Normal intervals.    PROCEDURE DATA:  Standard passive tilt table test was done.  The patient's   resting blood pressure is 114/79.  Resting heart rate was 68 beats a minute.    The patient had infrequent PVCs and PACs.  At minute 20, her blood pressure   was still normal.  Heart rate had elevated to 119 with sinus tachycardia.    Nitroglycerine was given at minute 20.  By minute 24, her heart rate was   sinus, but 144 beats a minute.  Her blood pressure was 71/38.  She complained   of chest discomfort, but no fainting up until 30 minutes.  Her blood pressure   upon going flat recovered to 95/64, heart rate eventually came down to 127 and   then to normal resting levels.  No other complications.  Recovery was   uneventful.    IMPRESSION:  1.  Symptomatic bradycardia and sinus tachycardia with tilt table.  No syncope   appreciated.  2.  Above findings are likely compatible with vasovagal reaction.       ____________________________________     MD JOSEPH STINSON / SHRUTHI    DD:  09/14/2018 13:17:48  DT:  09/14/2018 13:39:36    D#:  [id number]  Job#:  [id number]

## 2018-09-14 NOTE — PROCEDURES
DATE OF SERVICE:  09/12/2018    TILT TABLE TEST    INDICATIONS:  Dizziness.    BASELINE ECG:  Sinus rhythm, incomplete right bundle-branch block, nonspecific   ST changes.  Normal intervals.    PROCEDURE DATA:  Standard passive tilt table test was done.  The patient's   resting blood pressure is 114/79.  Resting heart rate was 68 beats a minute.    The patient had infrequent PVCs and PACs.  At minute 20, her blood pressure   was still normal.  Heart rate had elevated to 119 with sinus tachycardia.    Nitroglycerine was given at minute 20.  By minute 24, her heart rate was   sinus, but 144 beats a minute.  Her blood pressure was 71/38.  She complained   of chest discomfort, but no fainting up until 30 minutes.  Her blood pressure   upon going flat recovered to 95/64, heart rate eventually came down to 127 and   then to normal resting levels.  No other complications.  Recovery was   uneventful.    IMPRESSION:  1.  Symptomatic bradycardia and sinus tachycardia with tilt table.  No syncope   appreciated.  2.  Above findings are likely compatible with vasovagal reaction.       ____________________________________     MD JOSEPH STINSON / SHRUTHI    DD:  09/14/2018 13:17:48  DT:  09/14/2018 13:39:36    D#:  [id number]  Job#:  [id number]

## 2018-09-15 NOTE — PROCEDURES
DATE OF SERVICE:  09/12/2018    TILT TABLE TEST    INDICATIONS:  Dizziness.    BASELINE ECG:  Sinus rhythm, incomplete right bundle-branch block, nonspecific   ST changes.  Normal intervals.    PROCEDURE DATA:  Standard passive tilt table test was done.  The patient's   resting blood pressure is 114/79.  Resting heart rate was 68 beats a minute.    The patient had infrequent PVCs and PACs.  At minute 20, her blood pressure   was still normal.  Heart rate had elevated to 119 with sinus tachycardia.    Nitroglycerine was given at minute 20.  By minute 24, her heart rate was   sinus, but 144 beats a minute.  Her blood pressure was 71/38.  She complained   of chest discomfort, but no fainting up until 30 minutes.  Her blood pressure   upon going flat recovered to 95/64, heart rate eventually came down to 127 and   then to normal resting levels.  No other complications.  Recovery was   uneventful.    IMPRESSION:  1.  Symptomatic bradycardia and sinus tachycardia with tilt table.  No syncope   appreciated.  2.  Above findings are likely compatible with vasovagal reaction.       ____________________________________     MD JOSEPH STINSON / SHRUTHI    DD:  09/14/2018 13:17:48  DT:  09/14/2018 13:39:36    D#:  [id number]  Job#:  [id number]

## 2018-09-25 ENCOUNTER — TELEPHONE (OUTPATIENT)
Dept: CARDIOLOGY | Facility: MEDICAL CENTER | Age: 58
End: 2018-09-25

## 2018-09-25 NOTE — TELEPHONE ENCOUNTER
----- Message from Catrina Dobson sent at 9/25/2018 10:20 AM PDT -----  Regarding: tilt table results  MOLLY/Margie      Patient is calling for tilt table results. She can be reached at [phone].

## 2018-10-02 ENCOUNTER — TELEPHONE (OUTPATIENT)
Dept: VASCULAR LAB | Facility: MEDICAL CENTER | Age: 58
End: 2018-10-02

## 2018-10-03 ENCOUNTER — ANTICOAGULATION MONITORING (OUTPATIENT)
Dept: VASCULAR LAB | Facility: MEDICAL CENTER | Age: 58
End: 2018-10-03

## 2018-10-03 LAB — INR PPP: 3 (ref 2–3.5)

## 2018-10-03 NOTE — TELEPHONE ENCOUNTER
OP Anticoagulation Telephone Note    Date: 10/2/2018       Plan:  Called to remind patient to get PT/INR lab done.  Left message with pts boyfriend to have Phyllis get INR checked NEHEMIAH Gandhi, Pharm D

## 2018-10-03 NOTE — PROGRESS NOTES
Anticoagulation Summary  As of 10/3/2018    INR goal:   2.5-3.5   TTR:   50.3 % (3.1 y)   Today's INR:   3   Warfarin maintenance plan:   4.5 mg (3 mg x 1.5) on Mon, Wed, Fri; 3 mg (3 mg x 1) all other days   Weekly warfarin total:   25.5 mg   Plan last modified:   Deacon Novak, PharmD (8/15/2016)   Next INR check:   10/17/2018   Target end date:   Indefinite    Indications    Factor V Leiden (HCC) (Resolved) [D68.51]  Deep vein thrombosis [453.40] (Resolved) [I82.409]  Pulmonary embolism [415.19] (Resolved) [I26.99]             Anticoagulation Episode Summary     [facility] check location[facility]   [facility]    Preferred lab:       Send INR reminders to:       Comments:   PATIENT SELF MONITORING      Anticoagulation Care Providers     Provider Role Specialty Phone number    DERICK Diehl Referring Family Medicine [phone]    Eliseo Eduardo M.D. Responsible Cardiology [phone]    Deacon Novak, PharmD Responsible          Anticoagulation Patient Findings    Left voicemail message to report a therapeutic INR of 3.  Reminded pt she needs to send all INR results to the clinic as she left a VM with multiple readings from the past month that she didn't call in previously.  Pt to continue with current warfarin dosing regimen. Requested pt contact the clinic for any s/s of unusual bleeding, bruising, clotting or any changes to diet or medication. FU 2 weeks.    Russell Fraire, PharmD

## 2018-10-16 ENCOUNTER — ANTICOAGULATION MONITORING (OUTPATIENT)
Dept: VASCULAR LAB | Facility: MEDICAL CENTER | Age: 58
End: 2018-10-16

## 2018-10-16 NOTE — PROGRESS NOTES
Pt reports that she is scheduled for a scheduled epidural by a provider in [place], [place]    October 26- Last dose of warfarin  October 27 - NO WARFARIN, Lovenox 100mg sub q at dinner time  October 28- NO WARFARIN, Lovenox 100mg sub q at dinner time  October 29 - NO WARFARIN, Lovenox 100mg sub q at dinner time  October 30- NO BLOOD THINNERS  October 31- PROCEDURE DAY NO LOVENOX  November 1 - LOVENOX 100MG SUB Q AND Warfarin 3mg at dinner time  November 2 - LOVENOX 100MG SUB Q AND Warfarin 3mg at dinner time  November 3 - LOVENOX 100MG SUB Q AND Warfarin 3mg at dinner time  November 4 - LOVENOX 100MG SUB Q AND Warfarin 3mg at dinner time  November 5 - LOVENOX 100MG SUB Q AND Warfarin 3mg at dinner time  November 6 TEST INR

## 2018-10-18 ENCOUNTER — ANTICOAGULATION MONITORING (OUTPATIENT)
Dept: VASCULAR LAB | Facility: MEDICAL CENTER | Age: 58
End: 2018-10-18

## 2018-10-18 LAB — INR PPP: 5.8 (ref 2–3.5)

## 2018-10-18 NOTE — PROGRESS NOTES
Anticoagulation Summary  As of 10/18/2018    INR goal:   2.5-3.5   TTR:   49.9 % (3.2 y)   Today's INR:   5.8!   Warfarin maintenance plan:   4.5 mg (3 mg x 1.5) on Mon, Wed, Fri; 3 mg (3 mg x 1) all other days   Weekly warfarin total:   25.5 mg   Plan last modified:   Deacon Novak, PharmMIESHA (8/15/2016)   Next INR check:   10/25/2018   Target end date:   Indefinite    Indications    Factor V Leiden (HCC) (Resolved) [D68.51]  Deep vein thrombosis [453.40] (Resolved) [I82.409]  Pulmonary embolism [415.19] (Resolved) [I26.99]             Anticoagulation Episode Summary     INR check location:   [facility]    Preferred lab:       Send INR reminders to:       Comments:   PATIENT SELF MONITORING      Anticoagulation Care Providers     Provider Role Specialty Phone number    DERICK Diehl Referring Family Medicine [phone]    Eliseo Eduardo M.D. Responsible Cardiology [phone]    Deacon Novak, PharmD Responsible          Anticoagulation Patient Findings    Spoke to patient on phone. No current signs of bleeding or thrombosis. No interval medication or diet changes that would explain supratherapeutic INR. HOLD warfarin x 2 doses then resume previous regimen. Follow up INR in 1 week(s).    Deacon Novak, PharmD

## 2018-10-29 ENCOUNTER — ANTICOAGULATION MONITORING (OUTPATIENT)
Dept: VASCULAR LAB | Facility: MEDICAL CENTER | Age: 58
End: 2018-10-29

## 2018-10-29 LAB — INR PPP: 1.5 (ref 2–3.5)

## 2018-10-29 NOTE — PROGRESS NOTES
Anticoagulation Summary  As of 10/29/2018    INR goal:   2.5-3.5   TTR:   49.6 % (3.2 y)   Today's INR:   1.5!   Warfarin maintenance plan:   4.5 mg (3 mg x 1.5) on Mon, Wed, Fri; 3 mg (3 mg x 1) all other days   Weekly warfarin total:   25.5 mg   Plan last modified:   Deacon Novak, Sandhya (8/15/2016)   Next INR check:   11/6/2018   Target end date:   Indefinite    Indications    Factor V Leiden (HCC) (Resolved) [D68.51]  Deep vein thrombosis [453.40] (Resolved) [I82.409]  Pulmonary embolism [415.19] (Resolved) [I26.99]             Anticoagulation Episode Summary     [facility]R check location[facility]   [facility]    Preferred lab:       Send INR reminders to:       Comments:   PATIENT SELF MONITORING      Anticoagulation Care Providers     Provider Role Specialty Phone number    DERICK Diehl Referring Family Medicine [phone]    Eliseo Eduardo M.D. Responsible Cardiology [phone]    Deacon Novak, PharmD Responsible          Anticoagulation Patient Findings    Left a message to report a SUB therapeutic INR of 1.5.  Pt to continue with bridging directions as shown below:    October 26- Last dose of warfarin  October 27 - NO WARFARIN, Lovenox 100mg sub q at dinner time  October 28- NO WARFARIN, Lovenox 100mg sub q at dinner time  October 29 - NO WARFARIN, Lovenox 100mg sub q at dinner time  October 30- NO BLOOD THINNERS  October 31- PROCEDURE DAY NO LOVENOX  November 1 - LOVENOX 100MG SUB Q AND Warfarin 3mg at dinner time  November 2 - LOVENOX 100MG SUB Q AND Warfarin 3mg at dinner time  November 3 - LOVENOX 100MG SUB Q AND Warfarin 3mg at dinner time  November 4 - LOVENOX 100MG SUB Q AND Warfarin 3mg at dinner time  November 5 - LOVENOX 100MG SUB Q AND Warfarin 3mg at dinner time  November 6 TEST INR     Requested pt contact the clinic for any s/s of unusual bleeding, bruising, clotting or any changes to diet or medication. FU 1 weeks.    Russell Fraire, RoxieD

## 2018-11-16 ENCOUNTER — HOSPITAL ENCOUNTER (OUTPATIENT)
Dept: LAB | Facility: MEDICAL CENTER | Age: 58
End: 2018-11-16
Attending: PHYSICIAN ASSISTANT
Payer: MEDICARE

## 2018-11-16 LAB
CRP SERPL HS-MCNC: 0.17 MG/DL (ref 0–0.75)
ERYTHROCYTE [SEDIMENTATION RATE] IN BLOOD BY WESTERGREN METHOD: 16 MM/HOUR (ref 0–30)

## 2018-11-16 PROCEDURE — 86140 C-REACTIVE PROTEIN: CPT

## 2018-11-16 PROCEDURE — 85652 RBC SED RATE AUTOMATED: CPT

## 2018-11-16 PROCEDURE — 36415 COLL VENOUS BLD VENIPUNCTURE: CPT

## 2018-11-26 DIAGNOSIS — E78.2 MIXED HYPERLIPIDEMIA: Primary | ICD-10-CM

## 2018-11-26 RX ORDER — EZETIMIBE 10 MG/1
10 TABLET ORAL DAILY
Qty: 90 TAB | Refills: 2 | Status: SHIPPED | OUTPATIENT
Start: 2018-11-26 | End: 2019-08-26 | Stop reason: SDUPTHER

## 2019-01-08 ENCOUNTER — HOSPITAL ENCOUNTER (OUTPATIENT)
Dept: LAB | Facility: MEDICAL CENTER | Age: 59
End: 2019-01-08
Attending: FAMILY MEDICINE
Payer: MEDICARE

## 2019-01-08 DIAGNOSIS — Z79.01 CHRONIC ANTICOAGULATION: ICD-10-CM

## 2019-01-08 LAB
INR PPP: 1.79 (ref 0.87–1.13)
PROTHROMBIN TIME: 20.9 SEC (ref 12–14.6)

## 2019-01-08 PROCEDURE — 85610 PROTHROMBIN TIME: CPT

## 2019-01-08 PROCEDURE — 36415 COLL VENOUS BLD VENIPUNCTURE: CPT

## 2019-01-09 ENCOUNTER — ANTICOAGULATION MONITORING (OUTPATIENT)
Dept: VASCULAR LAB | Facility: MEDICAL CENTER | Age: 59
End: 2019-01-09

## 2019-01-09 NOTE — PROGRESS NOTES
Anticoagulation Summary  As of 1/9/2019    INR goal:   2.5-3.5   TTR:   46.8 % (3.4 y)   Today's INR:   1.79! (1/8/2019)   Warfarin maintenance plan:   4.5 mg (3 mg x 1.5) on Mon, Wed, Fri; 3 mg (3 mg x 1) all other days   Weekly warfarin total:   25.5 mg   Plan last modified:   Deacon Novak, PharmD (8/15/2016)   Next INR check:   1/21/2019   Target end date:   Indefinite    Indications    Factor V Leiden (HCC) (Resolved) [D68.51]  Deep vein thrombosis [453.40] (Resolved) [I82.409]  Pulmonary embolism [415.19] (Resolved) [I26.99]             Anticoagulation Episode Summary     [facility]R check location:   [facility]    Preferred lab:       Send INR reminders to:       Comments:   PATIENT SELF MONITORING      Anticoagulation Care Providers     Provider Role Specialty Phone number    DERICK Diehl Referring Family Medicine [phone]    Eliseo Eduardo M.D. Responsible Cardiology [phone]    Deacon Novak, PharmD Responsible          Anticoagulation Patient Findings    Spoke with patients caregiver today regarding subtherapeutic INR of 1.79.  Patient denies any signs/symptoms of bruising or bleeding or any changes in diet and medications.  Instructed patient to call clinic with any questions or concerns.  She originally called to say that patient needed lovenox to bridge for upcoming TKA.  There was no documentation about this procedure as this patient has not follow up on INR's in several months.  TKA date set for 1-16-19, patient asked that instructions below be sent via email for lovenox bridge.    1/9: Warfarin 4.5mg  1/10: Warfarin 4.5mg  1/11: NO warfarin  1/12: NO warfarin, Lovenox 100 mg SQ one time daily  1/13: NO warfarin, Lovenox 100 mg SQ one time daily  1/14: NO warfarin, Lovenox 100 mg SQ one time daily  1/15: NO warfarin, NO Lovenox  1/16: Procedure day, restart warfarin at physician discretion, NO Lovenox  1/17: Warfarin 4.5mg, Lovenox 100 mg SQ daily  1/18: Warfarin 4.5mg, Lovenox 100 mg  SQ daily  1/19: Warfarin 4.5mg, Lovenox 100 mg SQ daily  1/20: Warfarin 3mg, Lovenox 100 mg SQ daily  1/21: Recheck INR  Do not stop lovenox until INR is >2.5.    Romero Gonzalez, PharmD

## 2019-02-08 ENCOUNTER — ANTICOAGULATION VISIT (OUTPATIENT)
Dept: MEDICAL GROUP | Facility: PHYSICIAN GROUP | Age: 59
End: 2019-02-08
Payer: MEDICARE

## 2019-02-08 DIAGNOSIS — Z79.01 CHRONIC ANTICOAGULATION: ICD-10-CM

## 2019-02-08 LAB — INR PPP: 1.7 (ref 2–3.5)

## 2019-02-08 PROCEDURE — 99211 OFF/OP EST MAY X REQ PHY/QHP: CPT | Performed by: NURSE PRACTITIONER

## 2019-02-08 PROCEDURE — 85610 PROTHROMBIN TIME: CPT | Performed by: NURSE PRACTITIONER

## 2019-02-08 NOTE — PROGRESS NOTES
Anticoagulation Summary  As of 2019    INR goal:   2.5-3.5   TTR:   45.6 % (3.5 y)   INR used for dosin.7! (2019)   Warfarin maintenance plan:   4.5 mg (3 mg x 1.5) every Mon, Wed, Fri; 3 mg (3 mg x 1) all other days   Weekly warfarin total:   25.5 mg   Plan last modified:   Deacon Novak, PharmD (8/15/2016)   Next INR check:   2/15/2019   Target end date:   Indefinite    Indications    Factor V Leiden (HCC) (Resolved) [D68.51]  Deep vein thrombosis [453.40] (Resolved) [I82.409]  Pulmonary embolism [415.19] (Resolved) [I26.99]             Anticoagulation Episode Summary     INR check location[facility]   [facility]    Preferred lab:       Send INR reminders to:       Comments:   PATIENT SELF MONITORING      Anticoagulation Care Providers     Provider Role Specialty Phone number    DERICK Diehl Referring Family Medicine [phone]    Eliseo Eduardo M.D. Responsible Cardiology [phone]    Deacon Novak, PharmD Responsible          Anticoagulation Patient Findings      HPI:  Phyllis Perez seen in clinic today, on anticoagulation therapy with warfarin for Factor V  Reason for today's visit (per our collaborative practice policy) is because their last INR was 1.79 on 19. Intervention at the last visit:Pt was bridging for procedure.   Changes to current medical/health status since last appt: Blood transfusion and procedure.   No interval changes to diet or any interval changes to medications since last appt.   No complications or cost restrictions with current therapy.   Confirmed dosing regimen.     A/P   INR  SUB-therapeutic.   Possible reason(s) INR is not in range today: Unknown.   Bolus today and tomorrow then Pt is to continue with current warfarin dosing regimen.     Follow up appointment in 1 weeks to reduce risk of adverse events related to this high risk medication, Warfarin.    Purpose of next visit:    They are at a increased risk of clots because INR is below goal.    Other  info:  Pt educated to contact our clinic with any changes in medications or s/s of bleeding or thrombosis  CHEST guidelines recommend frequent INR monitoring at regular intervals (a few days up to a max of 12 weeks) to ensure they are on the proper dose of warfarin and not having any complications from therapy. INRs can dramatically change over a short time period due to diet, medications, and medical conditions.     Russell Fraire, PharmD

## 2019-02-22 ENCOUNTER — ANTICOAGULATION VISIT (OUTPATIENT)
Dept: MEDICAL GROUP | Facility: PHYSICIAN GROUP | Age: 59
End: 2019-02-22
Payer: MEDICARE

## 2019-02-22 DIAGNOSIS — Z79.01 CHRONIC ANTICOAGULATION: ICD-10-CM

## 2019-02-22 LAB — INR PPP: 4.2 (ref 2–3.5)

## 2019-02-22 PROCEDURE — 85610 PROTHROMBIN TIME: CPT | Performed by: NURSE PRACTITIONER

## 2019-02-22 PROCEDURE — 99211 OFF/OP EST MAY X REQ PHY/QHP: CPT | Performed by: NURSE PRACTITIONER

## 2019-02-22 NOTE — PROGRESS NOTES
Anticoagulation Summary  As of 2019    INR goal:   2.5-3.5   TTR:   45.5 % (3.5 y)   INR used for dosin.2! (2019)   Warfarin maintenance plan:   4.5 mg (3 mg x 1.5) every Mon, Wed, Fri; 3 mg (3 mg x 1) all other days   Weekly warfarin total:   25.5 mg   Plan last modified:   Deacon Novak, PharmD (8/15/2016)   Next INR check:   3/1/2019   Target end date:   Indefinite    Indications    Factor V Leiden (HCC) (Resolved) [D68.51]  Deep vein thrombosis [453.40] (Resolved) [I82.409]  Pulmonary embolism [415.19] (Resolved) [I26.99]             Anticoagulation Episode Summary     [facility]R check location[facility]   [facility]    Preferred lab:       Send INR reminders to:       Comments:         Anticoagulation Care Providers     Provider Role Specialty Phone number    DERICK Diehl Referring Family Medicine [phone]    Eliseo Eduardo M.D. Responsible Cardiology [phone]    Deacon Novak, PharmD Responsible          Anticoagulation Patient Findings      HPI:  Phyllis presents for anticoagulation management. 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 No current signs of bleeding or thrombosis. No interval medication or diet changes.     A:  Supratherapeutic INR.    P:  Decrease warfarin dose to 1.5 mg tonight only then resume previously prescribed regimen. Follow up INR in one week.    Deacon Novak, PharmD

## 2019-03-22 ENCOUNTER — ANTICOAGULATION VISIT (OUTPATIENT)
Dept: MEDICAL GROUP | Facility: PHYSICIAN GROUP | Age: 59
End: 2019-03-22
Payer: MEDICARE

## 2019-03-22 DIAGNOSIS — Z79.01 CHRONIC ANTICOAGULATION: ICD-10-CM

## 2019-03-22 LAB — INR PPP: 3.8 (ref 2–3.5)

## 2019-03-22 PROCEDURE — 99211 OFF/OP EST MAY X REQ PHY/QHP: CPT | Performed by: NURSE PRACTITIONER

## 2019-03-22 PROCEDURE — 85610 PROTHROMBIN TIME: CPT | Performed by: NURSE PRACTITIONER

## 2019-03-22 NOTE — PROGRESS NOTES
Anticoagulation Summary  As of 3/22/2019    INR goal:   2.5-3.5   TTR:   44.6 % (3.6 y)   INR used for dosing:   3.8! (3/22/2019)   Warfarin maintenance plan:   4.5 mg (3 mg x 1.5) every Mon, Wed, Fri; 3 mg (3 mg x 1) all other days   Weekly warfarin total:   25.5 mg   Plan last modified:   Deacon Novak, PharmD (8/15/2016)   Next INR check:   4/19/2019   Target end date:   Indefinite    Indications    Factor V Leiden (HCC) (Resolved) [D68.51]  Deep vein thrombosis [453.40] (Resolved) [I82.409]  Pulmonary embolism [415.19] (Resolved) [I26.99]             Anticoagulation Episode Summary     INR check location[facility]   [facility]    Preferred lab:       Send INR reminders to:       Comments:         Anticoagulation Care Providers     Provider Role Specialty Phone number    DERICK Diehl Referring Family Medicine [phone]    Eliseo Eduardo M.D. Responsible Cardiology [phone]    Deacon Novak, PharmD Responsible          Anticoagulation Patient Findings      HPI:  Phyllis Perez seen in clinic today, on anticoagulation therapy with warfarin for Factor V.   Reason for today's visit (per our collaborative practice policy) is because their last INR was 4.2 on 2/22/19. Intervention at the last visit: Held one dose.   Changes to current medical/health status since last appt: none  No signs/symptoms of bleeding and/or thrombosis since the last appt.    No interval changes to diet or any interval changes to medications since last appt.   No complications or cost restrictions with current therapy.   Declines all vitals.     A/P   INR  SURPA-therapeutic.   Possible reason(s) INR is not in range today: Unknown  Reduce today then Pt is to continue with current warfarin dosing regimen.     Follow up appointment in 4 weeks (per pt availability) to reduce risk of adverse events related to this high risk medication, Warfarin.    Purpose of next visit:  They are at a increased risk of bleeding because INR above  goal.    Other info:  Pt educated to contact our clinic with any changes in medications or s/s of bleeding or thrombosis  CHEST guidelines recommend frequent INR monitoring at regular intervals (a few days up to a max of 12 weeks) to ensure they are on the proper dose of warfarin and not having any complications from therapy. INRs can dramatically change over a short time period due to diet, medications, and medical conditions.     Russell Fraire, PharmD

## 2019-04-12 DIAGNOSIS — D68.2 FACTOR V DEFICIENCY (HCC): ICD-10-CM

## 2019-04-12 RX ORDER — WARFARIN SODIUM 3 MG/1
TABLET ORAL
Qty: 135 TAB | Refills: 1 | Status: SHIPPED | OUTPATIENT
Start: 2019-04-12 | End: 2020-02-13 | Stop reason: SDUPTHER

## 2019-04-26 ENCOUNTER — ANTICOAGULATION VISIT (OUTPATIENT)
Dept: MEDICAL GROUP | Facility: PHYSICIAN GROUP | Age: 59
End: 2019-04-26
Payer: MEDICARE

## 2019-04-26 DIAGNOSIS — Z79.01 CHRONIC ANTICOAGULATION: ICD-10-CM

## 2019-04-26 LAB — INR PPP: 2.8 (ref 2–3.5)

## 2019-04-26 PROCEDURE — 85610 PROTHROMBIN TIME: CPT | Performed by: NURSE PRACTITIONER

## 2019-04-26 PROCEDURE — 93793 ANTICOAG MGMT PT WARFARIN: CPT | Performed by: NURSE PRACTITIONER

## 2019-04-26 NOTE — PROGRESS NOTES
OP Anticoagulation Service Note    Date: 2019  There were no vitals filed for this visit.    Anticoagulation Summary  As of 2019    INR goal:   2.5-3.5   TTR:   45.2 % (3.7 y)   INR used for dosin.80 (2019)   Warfarin maintenance plan:   4.5 mg (3 mg x 1.5) every Mon, Wed, Fri; 3 mg (3 mg x 1) all other days   Weekly warfarin total:   25.5 mg   Plan last modified:   Deacon Novak, PharmD (8/15/2016)   Next INR check:   2019   Target end date:   Indefinite    Indications    Factor V Leiden (HCC) (Resolved) [D68.51]  Deep vein thrombosis [453.40] (Resolved) [I82.409]  Pulmonary embolism [415.19] (Resolved) [I26.99]             Anticoagulation Episode Summary     [facility]R check location:   [facility]    Preferred lab:       Send INR reminders to:       Comments:         Anticoagulation Care Providers     Provider Role Specialty Phone number    HERMINIA Diehl. Referring Family Medicine [phone]    Eliseo Eduardo M.D. Responsible Cardiology [phone]    Deacon Novak, PharmD Responsible          Anticoagulation Patient Findings      HPI:   Phyllis Perez seen in clinic today, they are here today for a INR check on anticoagulation therapy with warfarin because they have DVT    The reason for today's visit is to prevent morbidity and mortality from a blood clot  and to reduce the risk of bleeding while on a anticoagulant.     Additional education provided today regarding reducing bleed risk and dietary constraints:  About how vitamin K and foods work with warfarin and the bleeding risk on a anticoagulant     Any upcoming procedures:   none    Confirmed warfarin dosing regimen  Interval Changes with foods rich in vitamin K: No  Interval Changes in ETOH:   No  Interval Changes in smoking status:  No  Interval Changes in medication:  No   Cost restriction:  No    S/S of bleeding or bruising:  No  Signs/symptoms  thrombosis since the last appt:  No  Bleed risk is:  moderate,     3 vitals  included with today's appt :  (BP, HR, weight, ht, RR)     Assessment:   INR  therapeutic.     no change is needed today because INR is in range.      They have a TTR of 45.2  which is not at target (TTR target/goal is 100%) and requires close follow up to prevent a adverse event (the lower the TTR the higher risk of clots, strokes, or bleeding).       Plan:  Continue weekly warfarin dose as noted      Follow up:  Follow up appointment in 8 week(s)       Other info:  Pt educated to contact our clinic with any changes in medications or s/s of bleeding or thrombosis    CHEST guidelines recommend frequent INR monitoring at regular intervals (a few days up to a max of 12 weeks) to ensure they are on the proper dose of warfarin and not having any complications from therapy.  INRs can dramatically change over a short time period due to diet, medications, and medical conditions.

## 2019-06-01 LAB — INR PPP: 4.8 (ref 2–3.5)

## 2019-06-03 ENCOUNTER — ANTICOAGULATION MONITORING (OUTPATIENT)
Dept: MEDICAL GROUP | Facility: PHYSICIAN GROUP | Age: 59
End: 2019-06-03

## 2019-06-03 NOTE — PROGRESS NOTES
I was able to speak with her , looks like she might be having an epidural on June 13/2019.  I asked that she call us if she wakes up today or call tomorrow so we could get more information about this procedure as well as determining the proper Lovenox dose etc.

## 2019-06-04 ENCOUNTER — ANTICOAGULATION MONITORING (OUTPATIENT)
Dept: VASCULAR LAB | Facility: MEDICAL CENTER | Age: 59
End: 2019-06-04

## 2019-06-04 ENCOUNTER — TELEPHONE (OUTPATIENT)
Dept: VASCULAR LAB | Facility: MEDICAL CENTER | Age: 59
End: 2019-06-04

## 2019-06-04 DIAGNOSIS — Z79.01 CHRONIC ANTICOAGULATION: ICD-10-CM

## 2019-06-04 NOTE — TELEPHONE ENCOUNTER
[facility]    Spoke with  who confirms pt has upcoming epidural.  He doesn't manage his wife's medications and his wife (the patient) is asleep right now.  Requested pt to call back so that we can discuss perioperative anticoagulation.    Russell Fraire, PharmD

## 2019-06-04 NOTE — PROGRESS NOTES
[facility]     Pt has upcoming epidural, denies any new renal developments.  She has tolerated enoxaparin in the past.       Days prior to procedure:  5 - No warfarin, No enoxaparin  4 - No warfarin, begin enoxaparin 100 mg  daily  3 - No warfarin, continue enoxaparin  2 - No warfarin, continue enoxaparin  1 - No warfarin, Stop enoxaparin    Day of procedure:  (June 12th)  Operating physician to determine start date for enoxaparin and warfarin    Days after procedure:  1 - Begin warfarin and enoxaparin if physician agrees  2 - Continue warfarin and enoxaparin  3 - Continue warfarin and enoxaparin  4 - Continue warfarin and enoxaparin   5 - Continue warfarin and enoxaparin and have INR checked    Russell Fraire, RoxieD

## 2019-06-06 DIAGNOSIS — I25.10 CORONARY ARTERIOSCLEROSIS: ICD-10-CM

## 2019-06-06 DIAGNOSIS — E78.2 MIXED HYPERLIPIDEMIA: ICD-10-CM

## 2019-06-06 RX ORDER — ATORVASTATIN CALCIUM 40 MG/1
40 TABLET, FILM COATED ORAL
Qty: 90 TAB | Refills: 0 | Status: SHIPPED | OUTPATIENT
Start: 2019-06-06 | End: 2019-09-01 | Stop reason: SDUPTHER

## 2019-06-07 ENCOUNTER — HOSPITAL ENCOUNTER (OUTPATIENT)
Dept: LAB | Facility: MEDICAL CENTER | Age: 59
End: 2019-06-07
Attending: NURSE PRACTITIONER
Payer: MEDICARE

## 2019-06-07 ENCOUNTER — ANTICOAGULATION VISIT (OUTPATIENT)
Dept: MEDICAL GROUP | Facility: PHYSICIAN GROUP | Age: 59
End: 2019-06-07
Payer: MEDICARE

## 2019-06-07 ENCOUNTER — ANTICOAGULATION MONITORING (OUTPATIENT)
Dept: VASCULAR LAB | Facility: MEDICAL CENTER | Age: 59
End: 2019-06-07

## 2019-06-07 DIAGNOSIS — I82.90 DEEP VEIN THROMBOSIS (DVT) OF NON-EXTREMITY VEIN, UNSPECIFIED CHRONICITY: ICD-10-CM

## 2019-06-07 LAB
INR PPP: 6.8 (ref 0.87–1.13)
INR PPP: 8 (ref 2–3.5)
PROTHROMBIN TIME: 62.1 SEC (ref 12–14.6)

## 2019-06-07 PROCEDURE — 36415 COLL VENOUS BLD VENIPUNCTURE: CPT

## 2019-06-07 PROCEDURE — 85610 PROTHROMBIN TIME: CPT | Performed by: FAMILY MEDICINE

## 2019-06-07 PROCEDURE — 85610 PROTHROMBIN TIME: CPT

## 2019-06-07 RX ORDER — PHYTONADIONE 5 MG/1
2.5 TABLET ORAL ONCE
Qty: 1 TAB | Refills: 0 | Status: SHIPPED | OUTPATIENT
Start: 2019-06-07 | End: 2019-06-07

## 2019-06-07 NOTE — PROGRESS NOTES
OP Anticoagulation Service Note    Date: 2019    Anticoagulation Summary  As of 2019    INR goal:   2.5-3.5   TTR:   44.8 % (3.8 y)   INR used for dosin.00! (2019)   Warfarin maintenance plan:   4.5 mg (3 mg x 1.5) every Mon, Wed, Fri; 3 mg (3 mg x 1) all other days   Weekly warfarin total:   25.5 mg   Plan last modified:   Deacon Novak, PharmD (8/15/2016)   Next INR check:   2019   Target end date:   Indefinite    Indications    Factor V Leiden (HCC) (Resolved) [D68.51]  Deep vein thrombosis [453.40] (Resolved) [I82.409]  Pulmonary embolism [415.19] (Resolved) [I26.99]             Anticoagulation Episode Summary     [facility] check location[facility]   [facility]    Preferred lab:       Send INR reminders to:       Comments:         Anticoagulation Care Providers     Provider Role Specialty Phone number    DERICK Diehl Referring Family Medicine [phone]    Eliseo Eduardo M.D. Responsible Cardiology [phone]    Deacon Novak, PharmD Responsible          Anticoagulation Patient Findings      HPI:   Phyllis Perez seen in clinic today, they are here today for a INR check on anticoagulation therapy with warfarin because they have Factor V Leiden and a history of a DVT and a pulmonary embolism    The reason for today's visit is to prevent morbidity and mortality from a blood clot and to reduce the risk of bleeding while on a anticoagulant.     Dose the patient in the medical group have a [facility] primary care provider and proper insurance?  Polina Zamora M.D.  [address] [facility] [phone]      Additional education provided today regarding reducing bleed risk and dietary constraints:  About how vitamin K and foods work with warfarin and the bleeding risk on a anticoagulant     Any upcoming procedures: She has an upcoming epidural on 2019.  She was given instructions on how to use Lovenox, see last note.    Confirmed warfarin dosing regimen  Interval Changes with foods  rich in vitamin K: No  Interval Changes in ETOH:   No  Interval Changes in smoking status:  No  Interval Changes in medication: She is also on a steroid and antibiotic, she did not know the names but these are obviously affecting her INR greatly.  Cost restriction:  No  S/S of bleeding or bruising:  No  Signs/symptoms  thrombosis since the last appt:  No  Bleed risk is:  moderate,       Assessment:   INR  supra-therapeutic.     A change is needed today because INR is out of range.      She will not be able to get a epidural with an INR this high.  Unlikely that her INR will come down without prescription dose of vitamin K.  She is very eager to get a epidural and is in a lot of pain.  Based on our conversation we will hold warfarin and have her get a INR via the lab today.  Also start a prescription dose of vitamin K.  Once her INR is at baseline she could start using Lovenox.  Then she should be able to safely have the epidural with a baseline INR and using Lovenox to bridge to mitigate her clot risk.    They have a TTR of 44 which is not at target (TTR target/goal is 100%) and requires close follow up to prevent a adverse event (the lower the TTR the higher risk of clots, strokes, or bleeding).     Medications reviewed and updated--yes she has started a antibiotic and a steroid but is not sure the name.  This is also likely contributing to her supratherapeutic INR.    3 vitals included with today's appt :  (BP, HR, weight, ht, RR)   There were no vitals filed for this visit.      Plan:  Stop warfarin  Prescription dose of vitamin K 2.5 mg x 1  Get an INR via the lab today for confirmation  Do not begin Lovenox until INR is less than 2  Get an INR on Monday via the lab  Seek medical attention for any uncontrolled bleed    Follow up:  Follow up appointment in 3 days via the lab      Other info:  Pt educated to contact our clinic with any changes in medications or s/s of bleeding or thrombosis    CHEST guidelines  recommend frequent INR monitoring at regular intervals (a few days up to a max of 12 weeks) to ensure they are on the proper dose of warfarin and not having any complications from therapy.  INRs can dramatically change over a short time period due to diet, medications, and medical conditions.

## 2019-06-08 NOTE — PROGRESS NOTES
Anticoagulation Summary  As of 2019    INR goal:   2.5-3.5   TTR:   44.8 % (3.8 y)   INR used for dosin.80! (2019)   Warfarin maintenance plan:   4.5 mg (3 mg x 1.5) every Mon, Wed, Fri; 3 mg (3 mg x 1) all other days   Weekly warfarin total:   25.5 mg   Plan last modified:   Deacon Novak PharmD (8/15/2016)   Next INR check:   6/10/2019   Target end date:   Indefinite    Indications    Factor V Leiden (HCC) (Resolved) [D68.51]  Deep vein thrombosis [453.40] (Resolved) [I82.409]  Pulmonary embolism [415.19] (Resolved) [I26.99]             Anticoagulation Episode Summary     [facility]   [facility]    Preferred lab:       Send INR reminders to:       Comments:         Anticoagulation Care Providers     Provider Role Specialty Phone number    DERICK Diehl Referring Family Medicine [phone]    Eliseo Eduardo M.D. Responsible Cardiology [phone]    Deacon Novak, PharmD Responsible          Anticoagulation Patient Findings      Spoke with patient  INR is SUPRA therapeutic at 6.8 via lab.   Pt is planning to have an epidural on Wednesday, 19. Rx VitK was ordered to pharmacy, however they don't have it in stock and it's unclear as to when they will get it.     At this point will have pt remain off warfarin and lovenox for the next 2 days. Pt will drink V8 to help her INR lower. Pt to repeat INR at [facility] on Monday 6/10/19 to see what INR is and potential dose of lovenox?    Repeat INR on Monday.     Brittany Carlson, RoxieD

## 2019-06-10 ENCOUNTER — TELEPHONE (OUTPATIENT)
Dept: VASCULAR LAB | Facility: MEDICAL CENTER | Age: 59
End: 2019-06-10

## 2019-06-10 ENCOUNTER — ANTICOAGULATION MONITORING (OUTPATIENT)
Dept: VASCULAR LAB | Facility: MEDICAL CENTER | Age: 59
End: 2019-06-10

## 2019-06-10 NOTE — TELEPHONE ENCOUNTER
Left voicemail message to ensure patient had INR level drawn today.  Pt was unable to acquire vitamin K reversal agent.  It is unlikely she will be able to have epidural injection on Wednesday.    Voicemailbox not set up on mobile.    Christiane Caal, Clinical Pharmacist, CDE, CACP

## 2019-06-13 LAB
INR PPP: 1.1 (ref 2–3.5)
INR PPP: 1.1 (ref 2–3.5)

## 2019-06-17 ENCOUNTER — ANTICOAGULATION MONITORING (OUTPATIENT)
Dept: VASCULAR LAB | Facility: MEDICAL CENTER | Age: 59
End: 2019-06-17

## 2019-06-17 DIAGNOSIS — D68.51 FACTOR V LEIDEN (HCC): ICD-10-CM

## 2019-06-17 PROCEDURE — 85610 PROTHROMBIN TIME: CPT

## 2019-06-17 NOTE — PROGRESS NOTES
[facility]  Anticoagulation Summary  As of 2019    INR goal:   2.5-3.5   TTR:   44.5 % (3.8 y)   INR used for dosin.10! (2019)   Warfarin maintenance plan:   4.5 mg (3 mg x 1.5) every Mon, Wed, Fri; 3 mg (3 mg x 1) all other days   Weekly warfarin total:   25.5 mg   Plan last modified:   Deacon Novak, PharmD (8/15/2016)   Next INR check:   2019   Target end date:   Indefinite    Indications    Factor V Leiden (HCC) (Resolved) [D68.51]  Deep vein thrombosis [453.40] (Resolved) [I82.409]  Pulmonary embolism [415.19] (Resolved) [I26.99]             Anticoagulation Episode Summary     [facility] check [facility]   [facility]    Preferred lab:       Send INR reminders to:       Comments:         Anticoagulation Care Providers     Provider Role Specialty Phone number    HERMINIA Diehl. Referring Family Medicine [phone]    Eliseo Eduardo M.D. Responsible Cardiology [phone]    Deacon Novak, PharmD Responsible          Anticoagulation Patient Findings  Patient Findings     Positives:   Change in medications, Hospital admission    Negatives:   Signs/symptoms of thrombosis, Signs/symptoms of bleeding, Laboratory test error suspected, Change in health, Change in alcohol use, Change in activity, Upcoming invasive procedure, Emergency department visit, Upcoming dental procedure, Missed doses, Extra doses, Change in diet/appetite, Bruising, Other complaints        Patient called today to report that she was hospitalized for internal bleeding due to tear in her stomach from coughing so hard. She reported having to get blood transfusion along with FFP. She was d/c on Thursday on Warfarin 3mg along with Lovenox injections as her INR was 1.1.    Patient will resume her regular dosing regimen along with Lovenox injections and will retest INR again tomorrow, and further dosing adjustments can be made at that time.     Gustavo Stoner   PharmD

## 2019-06-18 ENCOUNTER — HOSPITAL ENCOUNTER (OUTPATIENT)
Dept: LAB | Facility: MEDICAL CENTER | Age: 59
End: 2019-06-18
Attending: NURSE PRACTITIONER
Payer: MEDICARE

## 2019-06-18 LAB
INR PPP: 1.32 (ref 0.87–1.13)
PROTHROMBIN TIME: 16.7 SEC (ref 12–14.6)

## 2019-06-18 PROCEDURE — 36415 COLL VENOUS BLD VENIPUNCTURE: CPT

## 2019-06-18 PROCEDURE — 85610 PROTHROMBIN TIME: CPT

## 2019-06-19 ENCOUNTER — ANTICOAGULATION MONITORING (OUTPATIENT)
Dept: VASCULAR LAB | Facility: MEDICAL CENTER | Age: 59
End: 2019-06-19

## 2019-06-19 NOTE — PROGRESS NOTES
Anticoagulation Summary  As of 2019    INR goal:   2.5-3.5   TTR:   44.5 % (3.8 y)   INR used for dosin.32! (2019)   Warfarin maintenance plan:   4.5 mg (3 mg x 1.5) every Mon, Wed, Fri; 3 mg (3 mg x 1) all other days   Weekly warfarin total:   25.5 mg   Plan last modified:   Roxie IrvingD (8/15/2016)   Next INR check:   2019   Target end date:   Indefinite    Indications    Factor V Leiden (HCC) (Resolved) [D68.51]  Deep vein thrombosis [453.40] (Resolved) [I82.409]  Pulmonary embolism [415.19] (Resolved) [I26.99]             Anticoagulation Episode Summary     INR check location:   [facility]    Preferred lab:       Send INR reminders to:       Comments:         Anticoagulation Care Providers     Provider Role Specialty Phone number    DERICK Diehl Referring Family Medicine [phone]    Eliseo Eduardo M.D. Responsible Cardiology [phone]    Deacon Novak, PharmD Responsible          Anticoagulation Patient Findings    Left voicemail message to report a subtherapeutic INR of 1.32.  Requested pt contact the clinic for any s/s of unusual bleeding, bruising, clotting or any changes to diet or medication.   Instructed patient to bolus with 6mg X 1, 4.5mg X 1, then resume current warfarin regimen.  She is to continue with lovenox bridge as well until INR >2.5.  FU 2 days in clinic.  Romero Gonzalez, RoxieD

## 2019-06-20 DIAGNOSIS — R55 SYNCOPE, UNSPECIFIED SYNCOPE TYPE: ICD-10-CM

## 2019-06-21 ENCOUNTER — ANTICOAGULATION VISIT (OUTPATIENT)
Dept: MEDICAL GROUP | Facility: PHYSICIAN GROUP | Age: 59
End: 2019-06-21
Payer: MEDICARE

## 2019-06-21 DIAGNOSIS — I82.90 DEEP VEIN THROMBOSIS (DVT) OF NON-EXTREMITY VEIN, UNSPECIFIED CHRONICITY: ICD-10-CM

## 2019-06-21 LAB — INR PPP: 2.2 (ref 2–3.5)

## 2019-06-21 PROCEDURE — 85610 PROTHROMBIN TIME: CPT | Performed by: FAMILY MEDICINE

## 2019-06-21 RX ORDER — FLUDROCORTISONE ACETATE 0.1 MG/1
0.1 TABLET ORAL
Qty: 90 TAB | Refills: 0 | Status: SHIPPED | OUTPATIENT
Start: 2019-06-21 | End: 2019-09-17

## 2019-06-21 NOTE — PROGRESS NOTES
OP Anticoagulation Service Note    Date: 2019    Anticoagulation Summary  As of 2019    INR goal:   2.5-3.5   TTR:   44.4 % (3.8 y)   INR used for dosin.20! (2019)   Warfarin maintenance plan:   3 mg (3 mg x 1) every Tue, Thu; 4.5 mg (3 mg x 1.5) all other days   Weekly warfarin total:   28.5 mg   Plan last modified:   Felix Bailey, PharmD (2019)   Next INR check:   2019   Target end date:   Indefinite    Indications    Factor V Leiden (HCC) (Resolved) [D68.51]  Deep vein thrombosis [453.40] (Resolved) [I82.409]  Pulmonary embolism [415.19] (Resolved) [I26.99]             Anticoagulation Episode Summary     IN[facility] check location[facility]   [facility]    Preferred lab:       Send INR reminders to:       Comments:         Anticoagulation Care Providers     Provider Role Specialty Phone number    DERICK Diehl Referring Family Medicine [phone]    Eliseo Eduardo M.D. Responsible Cardiology [phone]    Deacon Novak, PharmD Responsible          Anticoagulation Patient Findings      HPI:   Phyllis Perez seen in clinic today, they are here today for a INR check on anticoagulation therapy     The reason for today's visit is to prevent morbidity and mortality from a blood clot or stroke and to reduce the risk of bleeding while on a anticoagulant.     Dose the patient in the medical group have a [facility] primary care provider and proper insurance?  Polina Zamora M.D.  [facility] [id number]      Additional education provided today regarding reducing bleed risk and dietary constraints:  About how vitamin K and foods work with warfarin and the bleeding risk on a anticoagulant     Any upcoming procedures:   none    Confirmed warfarin dosing regimen  Interval Changes with foods rich in vitamin K: No  Interval Changes in ETOH:   No  Interval Changes in smoking status:  No  Interval Changes in medication:  No   Cost restriction:  No  S/S of bleeding or bruising:   No  Signs/symptoms  thrombosis since the last appt:  No  Bleed risk is:  moderate,       Assessment:   INR  sub-therapeutic.     Medications reviewed and updated--    3 vitals included with today's appt :  (BP, HR, weight, ht, RR)   There were no vitals filed for this visit.      Plan:  Increase weekly warfarin dose as noted  And stop lovenox per pt       Follow up:  Follow up appointment in 1 week(s)       Other info:  Pt educated to contact our clinic with any changes in medications or s/s of bleeding or thrombosis    CHEST guidelines recommend frequent INR monitoring at regular intervals (a few days up to a max of 12 weeks) to ensure they are on the proper dose of warfarin and not having any complications from therapy.  INRs can dramatically change over a short time period due to diet, medications, and medical conditions.

## 2019-06-26 ENCOUNTER — HOSPITAL ENCOUNTER (OUTPATIENT)
Dept: LAB | Facility: MEDICAL CENTER | Age: 59
End: 2019-06-26
Attending: NURSE PRACTITIONER
Payer: MEDICARE

## 2019-06-26 LAB
INR PPP: 2.46 (ref 0.87–1.13)
PROTHROMBIN TIME: 27.5 SEC (ref 12–14.6)

## 2019-06-26 PROCEDURE — 85610 PROTHROMBIN TIME: CPT

## 2019-06-26 PROCEDURE — 36415 COLL VENOUS BLD VENIPUNCTURE: CPT

## 2019-06-27 ENCOUNTER — ANTICOAGULATION MONITORING (OUTPATIENT)
Dept: VASCULAR LAB | Facility: MEDICAL CENTER | Age: 59
End: 2019-06-27

## 2019-06-27 NOTE — PROGRESS NOTES
Anticoagulation Summary  As of 2019    INR goal:   2.5-3.5   TTR:   44.2 % (3.8 y)   INR used for dosin.46! (2019)   Warfarin maintenance plan:   3 mg (3 mg x 1) every Tue, Thu; 4.5 mg (3 mg x 1.5) all other days   Weekly warfarin total:   28.5 mg   Plan last modified:   Felix Bailey, PharmD (2019)   Next INR check:   7/3/2019   Target end date:   Indefinite    Indications    Factor V Leiden (HCC) (Resolved) [D68.51]  Deep vein thrombosis [453.40] (Resolved) [I82.409]  Pulmonary embolism [415.19] (Resolved) [I26.99]             Anticoagulation Episode Summary     [facility]   [facility]    Preferred lab:       Send INR reminders to:       Comments:         Anticoagulation Care Providers     Provider Role Specialty Phone number    DERICK Diehl Referring Family Medicine [phone]    Eliseo Eduardo M.D. Responsible Cardiology [phone]    Deacon Novak, PharmD Responsible          Anticoagulation Patient Findings          Spoke with Phyllis to report a therapeutic INR of 2.5. Continue current dosing regimen.  Follow up in 1 weeks, to reduce the risk of adverse events related to this high risk medication, warfarin.    Christiane Caal, Clinical Pharmacist

## 2019-07-09 ENCOUNTER — HOSPITAL ENCOUNTER (EMERGENCY)
Facility: MEDICAL CENTER | Age: 59
End: 2019-07-09
Payer: MEDICARE

## 2019-07-09 ENCOUNTER — OFFICE VISIT (OUTPATIENT)
Dept: CARDIOLOGY | Facility: MEDICAL CENTER | Age: 59
End: 2019-07-09
Payer: MEDICARE

## 2019-07-09 VITALS
HEIGHT: 68 IN | BODY MASS INDEX: 22.39 KG/M2 | TEMPERATURE: 98.6 F | HEART RATE: 126 BPM | WEIGHT: 147.71 LBS | SYSTOLIC BLOOD PRESSURE: 128 MMHG | OXYGEN SATURATION: 97 % | DIASTOLIC BLOOD PRESSURE: 87 MMHG | RESPIRATION RATE: 20 BRPM

## 2019-07-09 VITALS
OXYGEN SATURATION: 97 % | DIASTOLIC BLOOD PRESSURE: 72 MMHG | HEART RATE: 124 BPM | SYSTOLIC BLOOD PRESSURE: 114 MMHG | BODY MASS INDEX: 23.34 KG/M2 | WEIGHT: 154 LBS | HEIGHT: 68 IN

## 2019-07-09 DIAGNOSIS — I49.5 SICK SINUS SYNDROME (HCC): ICD-10-CM

## 2019-07-09 DIAGNOSIS — Z95.0 CARDIAC PACEMAKER IN SITU: ICD-10-CM

## 2019-07-09 DIAGNOSIS — Z79.01 CHRONIC ANTICOAGULATION: ICD-10-CM

## 2019-07-09 DIAGNOSIS — R00.9 ABNORMAL HEART RATE: ICD-10-CM

## 2019-07-09 DIAGNOSIS — R55 SYNCOPE, UNSPECIFIED SYNCOPE TYPE: ICD-10-CM

## 2019-07-09 LAB — EKG IMPRESSION: NORMAL

## 2019-07-09 PROCEDURE — 302449 STATCHG TRIAGE ONLY (STATISTIC)

## 2019-07-09 PROCEDURE — 93005 ELECTROCARDIOGRAM TRACING: CPT

## 2019-07-09 PROCEDURE — 93000 ELECTROCARDIOGRAM COMPLETE: CPT | Performed by: INTERNAL MEDICINE

## 2019-07-09 PROCEDURE — 99214 OFFICE O/P EST MOD 30 MIN: CPT | Performed by: NURSE PRACTITIONER

## 2019-07-09 ASSESSMENT — ENCOUNTER SYMPTOMS
HEARTBURN: 0
NAUSEA: 0
SPEECH CHANGE: 0
TREMORS: 0
ABDOMINAL PAIN: 0
VOMITING: 0
COUGH: 0
HEADACHES: 0
WEIGHT LOSS: 0
HEMOPTYSIS: 0
DIARRHEA: 0
WEAKNESS: 0
ORTHOPNEA: 0
FOCAL WEAKNESS: 0
TINGLING: 0
BLOOD IN STOOL: 0
DOUBLE VISION: 0
LOSS OF CONSCIOUSNESS: 0
DIZZINESS: 1
SHORTNESS OF BREATH: 1
WHEEZING: 0
CHILLS: 0
PALPITATIONS: 0
SORE THROAT: 0
STRIDOR: 0
BLURRED VISION: 0
PND: 0
SPUTUM PRODUCTION: 0
FEVER: 0
SENSORY CHANGE: 0

## 2019-07-09 NOTE — PROGRESS NOTES
Cardiology/Electrophysiology Follow-up Note      Subjective:   Chief Complaint:   Chief Complaint   Patient presents with   • Syncope     PP DX:SYNCOPE       Phyllis Perez is a 58 y.o. female who presents today for follow up PMC check and review of her cardiac status.    She is followed by Dr. Butts and Chastity Ortiz.  Past medical history also significant for vasovagal syncope, PPM for bradycardia, CAD w MI, COPD and chronic oxygen usage at 3 Liters/min, Factor V deficiency, and chronic anticoagulation.    She tells me that she was hospitalized at [facility] in [place] 6/10/6/14, originally with pneumonia but states that at some point she ended up having 'gut bleeding' and required multiple transfusions.  We do not have records of this hospitalization at this time but have requested records.  She tells me that she was supposed to follow up with her MD in regards to this but has not done so.        Today in follow up she states that she continues to feel bad.  She feels dyspneic, slightly more so than her baseline and notes that any time she tries to do something she notices that her heart rates jumps up high and when it gets too bad she needs to sit down.  She notes that this has been occurring for 3-4 weeks but does not know when it started.  She states sometimes she still has a little chest discomofrt but that overall it has improved since her hospitlizaion.  She reports a syncopal episode while sitting at her kitchen table after discharge.  She states that she is still taking her florinef but could be better still her hydration.  She denies PND, orthopnea, or lower extremity edema.          Past Medical History:   Diagnosis Date   • Anemia    • Anticoagulation monitoring, special range     coumadin therapy   • CAD (coronary artery disease)     Old MI/POBA   • Clotting disorder (HCC)     Factor V Leiden, IVC filter for hx of DVT, PE   • Emphysema (subcutaneous) (surgical) resulting from a procedure     asthma    • Hot flashes    • Hyperlipemia, mixed    • Old MI (myocardial infarction)     Treated with POBA; Cath without obstructive DZ 2009   • Ovarian cancer (HCC)    • Sleep apnea     CPAP   • SSS (sick sinus syndrome) (HCC)     Tx with pacer   • Uterine cancer (HCC)      Past Surgical History:   Procedure Laterality Date   • CERVICAL FUSION POSTERIOR  4/12/2017    Procedure: CERVICAL FUSION POSTERIOR- WITH INSTRUMENTATION C4-5, C5-6;  Surgeon: Alejandro Rayo M.D.;  Location: SURGERY [facility];  Service:    • PACEMAKER INSERTION  2010    bradycardia   • ANGIOPLASTY      ?2004   • CHOLECYSTECTOMY     • ELBOW EXPLORATION      cts   • HYSTERECTOMY LAPAROSCOPY     • KNEE REPLACEMENT, TOTAL     • KNEE REVISION TOTAL     • LUMBAR EXPLORATION      buck   • NASAL RECONSTRUCTION     • OOPHORECTOMY     • OTHER SURGICAL PROCEDURE      IVC filter placement   • SHOULDER ARTHROPLASTY TOTAL REVERSED     • ZZZ CARDIAC CATH       Family History   Problem Relation Age of Onset   • Cancer Mother    • Cancer Father    • Cancer Maternal Aunt    • Cancer Maternal Uncle      Social History     Social History   • Marital status:      Spouse name: N/A   • Number of children: N/A   • Years of education: N/A     Occupational History   • Not on file.     Social History Main Topics   • Smoking status: Former Smoker     Years: 15.00     Quit date: 7/10/2001   • Smokeless tobacco: Never Used   • Alcohol use Yes      Comment: Notes intermittent alcohol use   • Drug use: No   • Sexual activity: Not Currently     Partners: Male     Other Topics Concern   • Not on file     Social History Narrative   • No narrative on file     Allergies   Allergen Reactions   • Bactrim [Sulfamethoxazole W-Trimethoprim]      constipation   • Morphine      Severe HA   • Other Misc Hives     cloraprep   • Tape      Blistering and then pop       Current Outpatient Prescriptions   Medication Sig Dispense Refill   • atorvastatin (LIPITOR) 40 MG Tab Take 1 Tab by  mouth every day. Needs to be seen for further refills. Thank you 90 Tab 0   • warfarin (COUMADIN) 3 MG Tab TAKE 1 TO 1& 1/2 TABLETS BY MOUTH EVERY DAY AS DIRECTED AT [facility] 135 Tab 1   • ezetimibe (ZETIA) 10 MG Tab Take 1 Tab by mouth every day. 90 Tab 2   • potassium chloride ER (KLOR-CON) 10 MEQ tablet Take 1 Tab by mouth every day.  5   • ropinirole (REQUIP) 4 MG tablet Take 4 mg by mouth every evening.     • temazepam (RESTORIL) 30 MG capsule Take 1 Cap by mouth at bedtime as needed for Sleep. Fill at monthly intervals or greater. 30 Cap 0   • fentanyl (DURAGESIC) 100 MCG/HR PATCH 72 HR Apply 1 Patch to skin as directed every 72 hours.  0   • ADVAIR DISKUS 250-50 MCG/DOSE AEROSOL POWDER, BREATH ACTIVATED Inhale 1 Puff by mouth every day.  11   • hydrocodone/acetaminophen (NORCO)  MG Tab Take 1 tablet by mouth every 4 hours as needed for breakthrough pain. The earliest date the pharmacy may fill the rx is 7/18/2016. 180 Tab 0   • carisoprodol (SOMA) 350 MG Tab Take 1 Tab by mouth every 8 hours as needed for Muscle Spasms. Fill at monthly intervals or greater. 90 Tab 0   • aspirin (ASA) 81 MG Chew Tab chewable tablet Take 81 mg by mouth every day.     • fluticasone (FLONASE) 50 MCG/ACT nasal spray Spray 1 Spray in nose every day. 16 g 11   • ferrous sulfate 325 (65 FE) MG tablet Take 1 Tab by mouth every day. 30 Tab 3   • fludrocortisone (FLORINEF) 0.1 MG Tab Take 1 Tab by mouth every day. Needs to be seen for further refills. Thank you (Patient not taking: Reported on 7/9/2019) 90 Tab 0   • enoxaparin (LOVENOX) 100 MG/ML Solution inj Inject 100 mg as instructed every day. 10 Syringe 0   • nitroglycerin (NITROSTAT) 0.4 MG SL Tab Place 1 Tab under tongue as needed for Chest Pain (As needed for chest pain). Up to 3 tablets every 5 minutes 25 Tab 2   • albuterol (VENTOLIN OR PROVENTIL) 108 (90 BASE) MCG/ACT AERS inhalation aerosol Inhale 2 Puffs by mouth every 6 hours as needed for Shortness of Breath.  "8.5 g 3   • Loratadine (CLARITIN) 10 MG CAPS Take 1 Cap by mouth every day. 30 Cap 11     No current facility-administered medications for this visit.        Review of Systems   Constitutional: Positive for malaise/fatigue. Negative for chills, fever and weight loss.   HENT: Negative for congestion, nosebleeds, sore throat and tinnitus.    Eyes: Negative for blurred vision and double vision.   Respiratory: Positive for shortness of breath. Negative for cough, hemoptysis, sputum production, wheezing and stridor.    Cardiovascular: Negative for chest pain, palpitations, orthopnea, leg swelling and PND.   Gastrointestinal: Negative for abdominal pain, blood in stool, diarrhea, heartburn, nausea and vomiting.   Musculoskeletal:        States MSK chest pain secondary to cough    Skin: Negative for rash.   Neurological: Positive for dizziness. Negative for tingling, tremors, sensory change, speech change, focal weakness, loss of consciousness, weakness and headaches.        Reports a syncopal episode in the last few weeks      All others systems reviewed and negative.     Objective:     /72 (BP Location: Left arm, Patient Position: Sitting, BP Cuff Size: Adult)   Pulse (!) 124   Ht 1.727 m (5' 7.99\")   Wt 69.9 kg (154 lb)   SpO2 97%  Body mass index is 23.42 kg/m².    Physical Exam      Cardiac Imaging and Procedures Review:    EKG (7/9/19) reviewed by myself:   Ventricular paced, rate 125.       Labs (personally reviewed and notable for):   Lab Results   Component Value Date/Time    SODIUM 139 06/12/2018 11:30 AM    POTASSIUM 4.5 06/12/2018 11:30 AM    CHLORIDE 103 06/12/2018 11:30 AM    CO2 29 06/12/2018 11:30 AM    GLUCOSE 87 06/12/2018 11:30 AM    BUN 7 (L) 06/12/2018 11:30 AM    CREATININE 0.91 06/12/2018 11:30 AM      Lab Results   Component Value Date/Time    WBC 6.5 04/03/2017 08:26 AM    RBC 4.80 04/03/2017 08:26 AM    HEMOGLOBIN 14.2 04/03/2017 08:26 AM    HEMATOCRIT 45.1 04/03/2017 08:26 AM    MCV " 94.0 04/03/2017 08:26 AM    MCH 29.6 04/03/2017 08:26 AM    MCHC 31.5 (L) 04/03/2017 08:26 AM    MPV 10.2 04/03/2017 08:26 AM    NEUTSPOLYS 52.50 04/03/2017 08:26 AM    LYMPHOCYTES 38.20 04/03/2017 08:26 AM    MONOCYTES 5.70 04/03/2017 08:26 AM    EOSINOPHILS 2.80 04/03/2017 08:26 AM    BASOPHILS 0.60 04/03/2017 08:26 AM    HYPOCHROMIA 1+ 12/11/2013 12:41 PM      PT/INR:   Lab Results   Component Value Date/Time    PROTHROMBTM 27.5 (H) 06/26/2019 10:56 AM    INR 2.46 (H) 06/26/2019 10:56 AM   ]      Assessment:     1. Abnormal heart rate  EKG   2. Cardiac pacemaker in situ     3. Chronic anticoagulation     4. Sick sinus syndrome (HCC)     5. Syncope, unspecified syncope type         Medical Decision Making:  Today's Assessment / Status / Plan:   Patient's heart rate in office today consistently 120-140 (consistent monitoring was completed on PPM).  On 12 lead EKG her rhythm is V paced, rhythm strip consistent with possible MAT/AT.  Her rhythm is regular, I do not think she is in atrial fibrillation.  I am concerned with reported history of recent bleeding and tachycardia.  She is not hypotensive in the office today but reports that she feels poorly.  I have suggested that she consider further workup/evaluation in the ED given her recent history.  I have reviewed the case/EKG/rhythm strips with Dr. Floyd (ADD) and he is in agreement that patient should be seen in ED.  She states that her boyfriend will transport her and declines [facility] transport.  MY nurse will  notify the ED that she will be coming.      RTCin 3 weeks for PMC check and with [facility] as scheduled, sooner if clinical condition changes  Collaborating MD/ADD: Case discussed with Dr. Floyd.     NAVDEEP Gutiérrez.

## 2019-07-09 NOTE — ED TRIAGE NOTES
Chief Complaint   Patient presents with   • Sent by MD     cardiologist   • Tachycardia   • Palpitations     EKG ordered.  Pt states palpitations X 2 weeks.  Denies n/v, no sob.  Triage process explained to patient.  Pt back to waiting room.  Pt instructed to inform RN if any changes or questions arise.

## 2019-07-13 LAB — EKG IMPRESSION: NORMAL

## 2019-07-30 ENCOUNTER — TELEPHONE (OUTPATIENT)
Dept: VASCULAR LAB | Facility: MEDICAL CENTER | Age: 59
End: 2019-07-30

## 2019-07-30 NOTE — TELEPHONE ENCOUNTER
7/30/2019    Spoke to patient's  on phone regarding overdue INR. Phyllis no longer has a home monitor. Scheduled a visit in [place] to determine anticoagulation f/u plan.    Deacon Novak, RoxieD

## 2019-08-06 ENCOUNTER — ANTICOAGULATION MONITORING (OUTPATIENT)
Dept: VASCULAR LAB | Facility: MEDICAL CENTER | Age: 59
End: 2019-08-06

## 2019-08-06 DIAGNOSIS — D68.51 FACTOR V LEIDEN (HCC): ICD-10-CM

## 2019-08-06 NOTE — PROGRESS NOTES
Left a voice message that she needs to go to the lab rather than be seen in clinic because she does not have a [facility] provider.

## 2019-08-09 ENCOUNTER — HOSPITAL ENCOUNTER (OUTPATIENT)
Dept: LAB | Facility: MEDICAL CENTER | Age: 59
End: 2019-08-09
Attending: NURSE PRACTITIONER
Payer: MEDICARE

## 2019-08-09 LAB
INR PPP: 2.4 (ref 0.87–1.13)
PROTHROMBIN TIME: 27 SEC (ref 12–14.6)

## 2019-08-09 PROCEDURE — 85610 PROTHROMBIN TIME: CPT

## 2019-08-09 PROCEDURE — 36415 COLL VENOUS BLD VENIPUNCTURE: CPT

## 2019-08-12 ENCOUNTER — ANTICOAGULATION MONITORING (OUTPATIENT)
Dept: VASCULAR LAB | Facility: MEDICAL CENTER | Age: 59
End: 2019-08-12

## 2019-08-12 NOTE — PROGRESS NOTES
Anticoagulation Summary  As of 2019    INR goal:   2.5-3.5   TTR:   42.9 % (4 y)   INR used for dosin.40! (2019)   Warfarin maintenance plan:   3 mg (3 mg x 1) every Thu; 4.5 mg (3 mg x 1.5) all other days   Weekly warfarin total:   30 mg   Plan last modified:   Romero Gonzalez PharmD (2019)   Next INR check:   2019   Target end date:   Indefinite    Indications    Factor V Leiden (HCC) (Resolved) [D68.51]  Deep vein thrombosis [453.40] (Resolved) [I82.409]  Pulmonary embolism [415.19] (Resolved) [I26.99]             Anticoagulation Episode Summary     INR check location:   [facility]    Preferred lab:       Send INR reminders to:       Comments:         Anticoagulation Care Providers     Provider Role Specialty Phone number    DERICK Diehl Referring Family Medicine [phone]    Eliseo Eduardo M.D. Responsible Cardiology [phone]    Deacon Novak, PharmD Responsible          Anticoagulation Patient Findings    Left voicemail message to report a subtherapeutic INR of 2.4.  Requested pt contact the clinic for any s/s of unusual bleeding, bruising, clotting or any changes to diet or medication.   Instructed patient to increase weekly warfarin regimen by ~5% as detailed above.  FU 2 weeks.  Romero Gonzalez, PharmD, BCACP

## 2019-08-26 DIAGNOSIS — E78.2 MIXED HYPERLIPIDEMIA: ICD-10-CM

## 2019-08-26 RX ORDER — EZETIMIBE 10 MG/1
10 TABLET ORAL DAILY
Qty: 90 TAB | Refills: 3 | Status: SHIPPED | OUTPATIENT
Start: 2019-08-26 | End: 2020-08-20

## 2019-09-09 ENCOUNTER — NON-PROVIDER VISIT (OUTPATIENT)
Dept: CARDIOLOGY | Facility: MEDICAL CENTER | Age: 59
End: 2019-09-09
Payer: MEDICARE

## 2019-09-09 ENCOUNTER — TELEPHONE (OUTPATIENT)
Dept: VASCULAR LAB | Facility: MEDICAL CENTER | Age: 59
End: 2019-09-09

## 2019-09-09 DIAGNOSIS — Z95.0 CARDIAC PACEMAKER IN SITU: ICD-10-CM

## 2019-09-09 DIAGNOSIS — I49.5 SICK SINUS SYNDROME (HCC): ICD-10-CM

## 2019-09-09 PROCEDURE — 93280 PM DEVICE PROGR EVAL DUAL: CPT | Performed by: INTERNAL MEDICINE

## 2019-09-09 NOTE — TELEPHONE ENCOUNTER
Called and spoke to patient reminding them to get INR checked as soon as possible. Patient states that she will go tomorrow afternoon.    Jefe Valdivia, Med. [facility]

## 2019-09-10 ENCOUNTER — HOSPITAL ENCOUNTER (OUTPATIENT)
Dept: LAB | Facility: MEDICAL CENTER | Age: 59
End: 2019-09-10
Attending: NURSE PRACTITIONER
Payer: MEDICARE

## 2019-09-10 LAB
INR PPP: 2.38 (ref 0.87–1.13)
PROTHROMBIN TIME: 26.8 SEC (ref 12–14.6)

## 2019-09-10 PROCEDURE — 85610 PROTHROMBIN TIME: CPT

## 2019-09-10 PROCEDURE — 36415 COLL VENOUS BLD VENIPUNCTURE: CPT

## 2019-09-11 ENCOUNTER — ANTICOAGULATION MONITORING (OUTPATIENT)
Dept: VASCULAR LAB | Facility: MEDICAL CENTER | Age: 59
End: 2019-09-11

## 2019-09-11 NOTE — PROGRESS NOTES
Anticoagulation Summary  As of 2019    INR goal:   2.5-3.5   TTR:   41.9 % (4 y)   INR used for dosin.38! (9/10/2019)   Warfarin maintenance plan:   4.5 mg (3 mg x 1.5) every day   Weekly warfarin total:   31.5 mg   Plan last modified:   Brittany Carlson, PharmD (2019)   Next INR check:   10/9/2019   Target end date:   Indefinite    Indications    Factor V Leiden (HCC) (Resolved) [D68.51]  Deep vein thrombosis [453.40] (Resolved) [I82.409]  Pulmonary embolism [415.19] (Resolved) [I26.99]             Anticoagulation Episode Summary     INR check location:   Anticoagulation Clinic    Preferred lab:       Send INR reminders to:       Comments:         Anticoagulation Care Providers     Provider Role Specialty Phone number    DERICK Diehl Referring Family Medicine [phone]    Eliseo Eduardo M.D. Responsible Cardiology [phone]    Deacon Novak, PharmD Responsible          Anticoagulation Patient Findings      Spoke with patient.  INR is SUB therapeutic.   Pt denies any unusual s/s of bleeding, bruising, clotting or any changes to diet or medications. Denies any etoh, cranberries, supplements, or illness.   Pt verifies warfarin weekly dosing.     Will have pt start a 5% increased weekly dose. Pt wants to try to get a home monitor again.     Repeat INR in 4 week(s) or sooner with home monitor.     Brittany Carlson, PharmD

## 2019-09-17 ENCOUNTER — OFFICE VISIT (OUTPATIENT)
Dept: CARDIOLOGY | Facility: MEDICAL CENTER | Age: 59
End: 2019-09-17
Payer: MEDICARE

## 2019-09-17 VITALS
OXYGEN SATURATION: 96 % | HEIGHT: 68 IN | WEIGHT: 183.31 LBS | HEART RATE: 94 BPM | SYSTOLIC BLOOD PRESSURE: 118 MMHG | BODY MASS INDEX: 27.78 KG/M2 | DIASTOLIC BLOOD PRESSURE: 84 MMHG

## 2019-09-17 DIAGNOSIS — Z95.0 CARDIAC PACEMAKER IN SITU: ICD-10-CM

## 2019-09-17 DIAGNOSIS — I25.10 CORONARY ARTERIOSCLEROSIS: ICD-10-CM

## 2019-09-17 DIAGNOSIS — Z95.828 S/P INSERTION OF IVC (INFERIOR VENA CAVAL) FILTER: ICD-10-CM

## 2019-09-17 DIAGNOSIS — R55 SYNCOPE, UNSPECIFIED SYNCOPE TYPE: ICD-10-CM

## 2019-09-17 DIAGNOSIS — Z79.01 CHRONIC ANTICOAGULATION: ICD-10-CM

## 2019-09-17 DIAGNOSIS — E78.2 MIXED HYPERLIPIDEMIA: ICD-10-CM

## 2019-09-17 DIAGNOSIS — D68.2 FACTOR V DEFICIENCY (HCC): ICD-10-CM

## 2019-09-17 PROBLEM — I49.5 SICK SINUS SYNDROME (HCC): Status: RESOLVED | Noted: 2018-08-02 | Resolved: 2019-09-17

## 2019-09-17 PROCEDURE — 99214 OFFICE O/P EST MOD 30 MIN: CPT | Performed by: NURSE PRACTITIONER

## 2019-09-17 RX ORDER — METOPROLOL SUCCINATE 25 MG/1
12.5 TABLET, EXTENDED RELEASE ORAL EVERY EVENING
Qty: 45 TAB | Refills: 3 | Status: SHIPPED | OUTPATIENT
Start: 2019-09-17 | End: 2020-03-10 | Stop reason: SDUPTHER

## 2019-09-17 ASSESSMENT — ENCOUNTER SYMPTOMS
SHORTNESS OF BREATH: 0
DIZZINESS: 0
PND: 0
ABDOMINAL PAIN: 0
COUGH: 0
FEVER: 0
CLAUDICATION: 0
ORTHOPNEA: 0
WEAKNESS: 0
PALPITATIONS: 1
MYALGIAS: 0

## 2019-09-17 NOTE — PROGRESS NOTES
Chief Complaint   Patient presents with   • Syncope     Subjective:   Phyllis Perez is a 57 y.o. female who presents today for follow up on tachycardia.    She is a patient of Dr. Butts in our office.     Hx of CAD (will obtain past records-normal PET in '15), SSS with PPM, Factor V Leiden with IVC filter for hx of DVT/PE on coumadin, COPD on oxygen, HLD, and prior uterine cancer.    She states she continues to have palpitations alongside frequent lightheadedness. She was worked up for syncope with improvements with hydration.    She is in the appointment with her dog.    Past Medical History:   Diagnosis Date   • Anemia    • Anticoagulation monitoring, special range     coumadin therapy   • CAD (coronary artery disease)     Old MI/POBA   • Clotting disorder (HCC)     Factor V Leiden, IVC filter for hx of DVT, PE   • Emphysema (subcutaneous) (surgical) resulting from a procedure     asthma   • Hot flashes    • Hyperlipemia, mixed    • Old MI (myocardial infarction)     Treated with POBA; Cath without obstructive DZ 2009   • Ovarian cancer (HCC)    • Sleep apnea     CPAP   • SSS (sick sinus syndrome) (HCC)     Tx with pacer   • Uterine cancer (HCC)      Past Surgical History:   Procedure Laterality Date   • CERVICAL FUSION POSTERIOR  4/12/2017    Procedure: CERVICAL FUSION POSTERIOR- WITH INSTRUMENTATION C4-5, C5-6;  Surgeon: Alejandro Rayo M.D.;  Location: SURGERY [facility];  Service:    • PACEMAKER INSERTION  2010    bradycardia   • ANGIOPLASTY      ?2004   • CHOLECYSTECTOMY     • ELBOW EXPLORATION      cts   • HYSTERECTOMY LAPAROSCOPY     • KNEE REPLACEMENT, TOTAL     • KNEE REVISION TOTAL     • LUMBAR EXPLORATION      buck   • NASAL RECONSTRUCTION     • OOPHORECTOMY     • OTHER SURGICAL PROCEDURE      IVC filter placement   • SHOULDER ARTHROPLASTY TOTAL REVERSED     • ZZZ CARDIAC CATH       Family History   Problem Relation Age of Onset   • Cancer Mother    • Cancer Father    • Cancer Maternal Aunt    •  Cancer Maternal Uncle      Social History     Socioeconomic History   • Marital status:      Spouse name: Not on file   • Number of children: Not on file   • Years of education: Not on file   • Highest education level: Not on file   Occupational History   • Not on file   Social Needs   • Financial resource strain: Not on file   • Food insecurity:     Worry: Not on file     Inability: Not on file   • Transportation needs:     Medical: Not on file     Non-medical: Not on file   Tobacco Use   • Smoking status: Former Smoker     Years: 15.00     Last attempt to quit: 7/10/2001     Years since quittin.2   • Smokeless tobacco: Never Used   Substance and Sexual Activity   • Alcohol use: Yes     Comment: Notes intermittent alcohol use   • Drug use: No   • Sexual activity: Not Currently     Partners: Male   Lifestyle   • Physical activity:     Days per week: Not on file     Minutes per session: Not on file   • Stress: Not on file   Relationships   • Social connections:     Talks on phone: Not on file     Gets together: Not on file     Attends [employer] service: Not on file     Active member of club or organization: Not on file     Attends meetings of clubs or organizations: Not on file     Relationship status: Not on file   • Intimate partner violence:     Fear of current or ex partner: Not on file     Emotionally abused: Not on file     Physically abused: Not on file     Forced sexual activity: Not on file   Other Topics Concern   • Not on file   Social History Narrative   • Not on file     Allergies   Allergen Reactions   • Bactrim [Sulfamethoxazole W-Trimethoprim]      constipation   • Morphine      Severe HA   • Other Misc Hives     cloraprep   • Tape      Blistering and then pop     Outpatient Encounter Medications as of 2019   Medication Sig Dispense Refill   • metoprolol SR (TOPROL XL) 25 MG TABLET SR 24 HR Take 0.5 Tabs by mouth every evening. 45 Tab 3   • ezetimibe (ZETIA) 10 MG Tab Take 1 Tab by  mouth every day. 90 Tab 3   • atorvastatin (LIPITOR) 40 MG Tab Take 1 Tab by mouth every day. Needs to be seen for further refills. Thank you 90 Tab 0   • warfarin (COUMADIN) 3 MG Tab TAKE 1 TO 1& 1/2 TABLETS BY MOUTH EVERY DAY AS DIRECTED AT COUMADIN CLINIC 135 Tab 1   • nitroglycerin (NITROSTAT) 0.4 MG SL Tab Place 1 Tab under tongue as needed for Chest Pain (As needed for chest pain). Up to 3 tablets every 5 minutes 25 Tab 2   • potassium chloride ER (KLOR-CON) 10 MEQ tablet Take 1 Tab by mouth every day.  5   • ropinirole (REQUIP) 4 MG tablet Take 4 mg by mouth every evening.     • temazepam (RESTORIL) 30 MG capsule Take 1 Cap by mouth at bedtime as needed for Sleep. Fill at monthly intervals or greater. 30 Cap 0   • fentanyl (DURAGESIC) 100 MCG/HR PATCH 72 HR Apply 1 Patch to skin as directed every 72 hours.  0   • ADVAIR DISKUS 250-50 MCG/DOSE AEROSOL POWDER, BREATH ACTIVATED Inhale 1 Puff by mouth every day.  11   • hydrocodone/acetaminophen (NORCO)  MG Tab Take 1 tablet by mouth every 4 hours as needed for breakthrough pain. The earliest date the pharmacy may fill the rx is 7/18/2016. 180 Tab 0   • carisoprodol (SOMA) 350 MG Tab Take 1 Tab by mouth every 8 hours as needed for Muscle Spasms. Fill at monthly intervals or greater. 90 Tab 0   • aspirin (ASA) 81 MG Chew Tab chewable tablet Take 81 mg by mouth every day.     • albuterol (VENTOLIN OR PROVENTIL) 108 (90 BASE) MCG/ACT AERS inhalation aerosol Inhale 2 Puffs by mouth every 6 hours as needed for Shortness of Breath. 8.5 g 3   • fluticasone (FLONASE) 50 MCG/ACT nasal spray Spray 1 Spray in nose every day. 16 g 11   • Loratadine (CLARITIN) 10 MG CAPS Take 1 Cap by mouth every day. 30 Cap 11   • ferrous sulfate 325 (65 FE) MG tablet Take 1 Tab by mouth every day. 30 Tab 3   • [DISCONTINUED] fludrocortisone (FLORINEF) 0.1 MG Tab Take 1 Tab by mouth every day. Needs to be seen for further refills. Thank you (Patient not taking: Reported on 7/9/2019) 90  "Tab 0   • [DISCONTINUED] enoxaparin (LOVENOX) 100 MG/ML Solution inj Inject 100 mg as instructed every day. 10 Syringe 0     No facility-administered encounter medications on file as of 9/17/2019.      Review of Systems   Constitutional: Positive for malaise/fatigue. Negative for fever.   Respiratory: Negative for cough and shortness of breath.    Cardiovascular: Positive for palpitations. Negative for chest pain, orthopnea, claudication, leg swelling and PND.   Gastrointestinal: Negative for abdominal pain.   Musculoskeletal: Negative for myalgias.   Neurological: Negative for dizziness and weakness.        Lightheadedness        Objective:   /84 (BP Location: Left arm, Patient Position: Sitting, BP Cuff Size: Adult)   Pulse 94   Ht 1.727 m (5' 7.99\")   Wt 83.2 kg (183 lb 5 oz)   SpO2 96%   BMI 27.88 kg/m²     Physical Exam   Constitutional: She is oriented to person, place, and time. She appears well-developed and well-nourished. She appears ill.   HENT:   Head: Normocephalic and atraumatic.   Eyes: EOM are normal.   Neck: No JVD present.   Cardiovascular: Normal rate, regular rhythm, normal heart sounds and intact distal pulses.   Pulmonary/Chest: Effort normal and breath sounds normal.   Musculoskeletal: Normal range of motion. She exhibits no edema.   Neurological: She is alert and oriented to person, place, and time.   Skin: Skin is warm and dry.   Nursing note and vitals reviewed.      Assessment:     1. Chronic anticoagulation     2. Coronary arteriosclerosis     3. Factor V deficiency (CMS-HCC): With history of DVT and pulmonary emboli     4. Mixed hyperlipidemia  Comp Metabolic Panel    Lipid Profile   5. S/P insertion of IVC (inferior vena caval) filter     6. Syncope, unspecified syncope type     7. Cardiac pacemaker in situ       Medical Decision Making:  Today's Assessment / Status / Plan:     1. CAD with prior MI  -need to obtain records from prior cath  -last cardiac PET was in '15 " negative for ischemia  -cont asa, statin  -follow symptoms clinically, no angina, PAUL chronic with COPD    2. HLD  -statin  -LDL goal <70 with CAD  -labs before next apt   -cont lipitor 80 mg QPM with zetia 10 mg     3. PSVT with SSS and PPM placement  -EP reviewed symptoms and ST  -sent to ER, full eval done at [place] with no concerns  -recommend start bb therapy lower dose in PM only  -start toprol 12.5 mg QPM to help with palpitations and ST    4. Factor V Leiden with prior hx of DVT/PE and IVC filter  -coumadin with anti-coag clinic    5. COPD  -managed with oxygen    6. Hypotension with prior syncope  -resolved  -follow with start of bb therapy    FU in clinic in 6 months with SC with PPM check    Patient verbalizes understanding and agrees with the plan of care.     Collaborating MD: Dk OLIVARES

## 2019-10-01 ENCOUNTER — ANTICOAGULATION MONITORING (OUTPATIENT)
Dept: VASCULAR LAB | Facility: MEDICAL CENTER | Age: 59
End: 2019-10-01

## 2019-10-01 LAB — INR PPP: 1.3 (ref 2–3.5)

## 2019-10-01 NOTE — PROGRESS NOTES
Anticoagulation Summary  As of 10/1/2019    INR goal:   2.5-3.5   TTR:   41.4 % (4.1 y)   INR used for dosin.30! (10/1/2019)   Warfarin maintenance plan:   4.5 mg (3 mg x 1.5) every day   Weekly warfarin total:   31.5 mg   Plan last modified:   Felix Bailey PharmD (10/1/2019)   Next INR check:   10/7/2019   Target end date:   Indefinite    Indications    Factor V Leiden (HCC) (Resolved) [D68.51]  Deep vein thrombosis [453.40] (Resolved) [I82.409]  Pulmonary embolism [415.19] (Resolved) [I26.99]             Anticoagulation Episode Summary     INR check location:   Anticoagulation Clinic    Preferred lab:       Send INR reminders to:       Comments:         Anticoagulation Care Providers     Provider Role Specialty Phone number    DERICK Diehl Referring Family Medicine [phone]    Eliseo Eduardo M.D. Responsible Cardiology [phone]    Roxie IrvingD Responsible          Anticoagulation Patient Findings        Spoke to patient on the phone.   INR  sub-therapeutic.   Surgery at [facility] on .  She preferred not using Lovenox today even though her INR was subtherapeutic.  She will need to stop warfarin for 5 days prior to her knee surgery and use Lovenox.  Denies signs/symptoms of bleeding and/or thrombosis.   Denies changes to diet or medications.   Follow up appointment in 1 week(s).    6mg x 3 days then continue weekly warfarin dose as noted      Felix Bailey, Sandhya

## 2019-10-23 ENCOUNTER — ANTICOAGULATION MONITORING (OUTPATIENT)
Dept: VASCULAR LAB | Facility: MEDICAL CENTER | Age: 59
End: 2019-10-23

## 2019-10-23 DIAGNOSIS — Z79.01 CHRONIC ANTICOAGULATION: ICD-10-CM

## 2019-10-23 LAB — INR PPP: 5.6 (ref 2–3.5)

## 2019-10-23 NOTE — PROGRESS NOTES
Anticoagulation Summary  As of 10/23/2019    INR goal:   2.5-3.5   TTR:   41.1 % (4.2 y)   INR used for dosin.60! (10/23/2019)   Warfarin maintenance plan:   4.5 mg (3 mg x 1.5) every day   Weekly warfarin total:   31.5 mg   Plan last modified:   Felix Bailey, PharmD (10/1/2019)   Next INR check:   2019   Target end date:   Indefinite    Indications    Factor V Leiden (HCC) (Resolved) [D68.51]  Deep vein thrombosis [453.40] (Resolved) [I82.409]  Pulmonary embolism [415.19] (Resolved) [I26.99]             Anticoagulation Episode Summary     INR check location:   Anticoagulation Clinic    Preferred lab:       Send INR reminders to:       Comments:   bello      Anticoagulation Care Providers     Provider Role Specialty Phone number    DERICK Diehl Referring Family Medicine [phone]    Eliseo Eduardo M.D. Responsible Cardiology [phone]    Deacon Novak, PharmD Responsible          Anticoagulation Patient Findings    Spoke with patient.  INR is SUPRA therapeutic.   Pt denies any unusual s/s of bleeding, bruising, clotting or any changes to diet or medications. Denies any etoh, cranberries, supplements, or illness.   Pt verifies warfarin weekly dosing.     Pt still having knee surgery with [facility] on 10/30/19, needs to be off warfarin.   Has used lovenox in the past, not sure if she still has some injections left from last procedure in . Sent lovenox 100mg injected daily to pharmacy.    Will have pt start holding her warfarin today in preparation for the procedure and then follow dosing instructions as below. Pt will be admitted to [facility] post op, so advised to have INR a few days post discharge.           Brittany Carlson, PharmD

## 2019-11-08 ENCOUNTER — ANTICOAGULATION MONITORING (OUTPATIENT)
Dept: VASCULAR LAB | Facility: MEDICAL CENTER | Age: 59
End: 2019-11-08

## 2019-11-08 LAB — INR PPP: 3.8 (ref 2–3.5)

## 2019-11-08 NOTE — PROGRESS NOTES
Anticoagulation Summary  As of 11/8/2019    INR goal:   2.5-3.5   TTR:   40.7 % (4.2 y)   INR used for dosing:   3.80! (11/8/2019)   Warfarin maintenance plan:   4.5 mg (3 mg x 1.5) every day   Weekly warfarin total:   31.5 mg   Plan last modified:   Felix Bailey, PharmD (10/1/2019)   Next INR check:   11/22/2019   Target end date:   Indefinite    Indications    Factor V Leiden (HCC) (Resolved) [D68.51]  Deep vein thrombosis [453.40] (Resolved) [I82.409]  Pulmonary embolism [415.19] (Resolved) [I26.99]             Anticoagulation Episode Summary     INR check location:   Anticoagulation Clinic    Preferred lab:       Send INR reminders to:       Comments:   bello      Anticoagulation Care Providers     Provider Role Specialty Phone number    DERICK Diehl Referring Family Medicine [phone]    Eliseo Eduardo M.D. Responsible Cardiology [phone]    Deacon Novak, PharmD Responsible          Anticoagulation Patient Findings  Patient Findings     Negatives:   Signs/symptoms of thrombosis, Signs/symptoms of bleeding, Laboratory test error suspected, Change in health, Change in alcohol use, Change in activity, Upcoming invasive procedure, Emergency department visit, Upcoming dental procedure, Missed doses, Extra doses, Change in medications, Change in diet/appetite, Hospital admission, Bruising, Other complaints        Spoke with the patient on the phone today, reporting a SUPRA-therapeutic INR of 3.8.  Confirmed the current warfarin dosing regimen and patient compliance. Patient had knee surgery and was bridging with Lovenox. Patient has a  nurse there for a few weeks and she instructed the patient to d/c lovenox as of Wednesday. She did NOT draw an INR so unsure as to why she was instructed to do so, but she has d/c lovenox.   Patient denies any interval changes to diet and/or medications. Patient denies any signs/symptoms of bleeding or clotting.  Patient had cracked/chapped lip that was  bleeding for several hours yesterday, so she reports skipping her dose last night. INR still above therapeutic range. Patient instructed to reduce dose to 3mg TONIGHT, then resume her current dosing regimen. Patient will retest INR again in 2 weeks (per pt preference).    Gustavo FaustinD

## 2019-12-05 ENCOUNTER — ANTICOAGULATION MONITORING (OUTPATIENT)
Dept: VASCULAR LAB | Facility: MEDICAL CENTER | Age: 59
End: 2019-12-05

## 2019-12-05 LAB — INR PPP: 1.7 (ref 2–3.5)

## 2019-12-05 NOTE — PROGRESS NOTES
Anticoagulation Summary  As of 2019    INR goal:   2.5-3.5   TTR:   40.8 % (4.3 y)   INR used for dosin.70! (2019)   Warfarin maintenance plan:   4.5 mg (3 mg x 1.5) every day   Weekly warfarin total:   31.5 mg   Plan last modified:   Felix Bailey PharmD (10/1/2019)   Next INR check:   2019   Target end date:   Indefinite    Indications    Factor V Leiden (HCC) (Resolved) [D68.51]  Deep vein thrombosis [453.40] (Resolved) [I82.409]  Pulmonary embolism [415.19] (Resolved) [I26.99]             Anticoagulation Episode Summary     INR check location:   Anticoagulation Clinic    Preferred lab:       Send INR reminders to:       Comments:   bello      Anticoagulation Care Providers     Provider Role Specialty Phone number    DERICK Diehl Referring Family Medicine [phone]    Eliseo Eduardo M.D. Responsible Cardiology [phone]    Roxie IrvingD Responsible          Anticoagulation Patient Findings      spoke to patient on the phone.   INR  sub-therapeutic.   Denies signs/symptoms of bleeding and/or thrombosis.     She reports having severe vomiting over the past few days.  She has been vomiting her pills.  Difficult with this scenario as a reduction in food intake can obviously increase her INR while vomiting the pill can decrease her INR.  We will elect to do a slightly higher dose today to increase her INR then resume her normal dose.  She says she is feeling better and hopes that she will be able to eat a normal meal today.  She will check early next week to make sure that we have INRs frequently while she is ill.  Follow up appointment in 1 week(s).    6mg x two Continue weekly warfarin dose as noted      Felix Bailey, Sandhya

## 2019-12-19 DIAGNOSIS — I25.10 CORONARY ARTERIOSCLEROSIS: ICD-10-CM

## 2019-12-19 DIAGNOSIS — E78.2 MIXED HYPERLIPIDEMIA: ICD-10-CM

## 2019-12-20 RX ORDER — ATORVASTATIN CALCIUM 40 MG/1
TABLET, FILM COATED ORAL
Qty: 90 TAB | Refills: 3 | Status: SHIPPED | OUTPATIENT
Start: 2019-12-20 | End: 2020-03-10

## 2019-12-24 LAB — INR PPP: 1.4 (ref 2–3.5)

## 2019-12-27 ENCOUNTER — TELEPHONE (OUTPATIENT)
Dept: VASCULAR LAB | Facility: MEDICAL CENTER | Age: 59
End: 2019-12-27

## 2019-12-28 NOTE — TELEPHONE ENCOUNTER
Left msg for INR reminder.     INR   Date Value Ref Range Status   12/05/2019 1.70  Final     Brittany Carlson, PharmD

## 2020-01-01 ENCOUNTER — ANTICOAGULATION MONITORING (OUTPATIENT)
Dept: VASCULAR LAB | Facility: MEDICAL CENTER | Age: 60
End: 2020-01-01

## 2020-01-01 ENCOUNTER — TELEPHONE (OUTPATIENT)
Dept: VASCULAR LAB | Facility: MEDICAL CENTER | Age: 60
End: 2020-01-01

## 2020-01-01 ENCOUNTER — HOSPITAL ENCOUNTER (OUTPATIENT)
Dept: RADIOLOGY | Facility: MEDICAL CENTER | Age: 60
End: 2020-11-16
Attending: ORTHOPAEDIC SURGERY
Payer: MEDICARE

## 2020-01-01 ENCOUNTER — OFFICE VISIT (OUTPATIENT)
Dept: CARDIOLOGY | Facility: MEDICAL CENTER | Age: 60
End: 2020-01-01
Payer: MEDICARE

## 2020-01-01 VITALS
DIASTOLIC BLOOD PRESSURE: 82 MMHG | HEIGHT: 68 IN | HEART RATE: 110 BPM | RESPIRATION RATE: 12 BRPM | WEIGHT: 195.7 LBS | OXYGEN SATURATION: 94 % | SYSTOLIC BLOOD PRESSURE: 94 MMHG | BODY MASS INDEX: 29.66 KG/M2

## 2020-01-01 DIAGNOSIS — M25.511 RIGHT SHOULDER PAIN, UNSPECIFIED CHRONICITY: ICD-10-CM

## 2020-01-01 DIAGNOSIS — Z79.01 CHRONIC ANTICOAGULATION: ICD-10-CM

## 2020-01-01 DIAGNOSIS — I25.10 CORONARY ARTERIOSCLEROSIS: ICD-10-CM

## 2020-01-01 DIAGNOSIS — E78.2 MIXED HYPERLIPIDEMIA: ICD-10-CM

## 2020-01-01 LAB
INR PPP: 3.4 (ref 2–3.5)
INR PPP: 3.8 (ref 2–3.5)
INR PPP: 3.8 (ref 2–3.5)
INR PPP: 4.6 (ref 2–3.5)
INR PPP: 5.2 (ref 2–3.5)

## 2020-01-01 PROCEDURE — 99213 OFFICE O/P EST LOW 20 MIN: CPT | Performed by: INTERNAL MEDICINE

## 2020-01-01 PROCEDURE — 700117 HCHG RX CONTRAST REV CODE 255: Performed by: ORTHOPAEDIC SURGERY

## 2020-01-01 PROCEDURE — 77002 NEEDLE LOCALIZATION BY XRAY: CPT | Mod: RT

## 2020-01-01 PROCEDURE — 73201 CT UPPER EXTREMITY W/DYE: CPT | Mod: RT

## 2020-01-01 RX ADMIN — IOHEXOL 5 ML: 300 INJECTION, SOLUTION INTRAVENOUS at 13:00

## 2020-01-01 ASSESSMENT — ENCOUNTER SYMPTOMS
COUGH: 0
DIZZINESS: 1
NECK PAIN: 1
BACK PAIN: 1
NERVOUS/ANXIOUS: 1
DEPRESSION: 1
BRUISES/BLEEDS EASILY: 0
FOCAL WEAKNESS: 0
VOMITING: 0
DIARRHEA: 1
CHILLS: 0
WEAKNESS: 0
FALLS: 1
WEIGHT LOSS: 0
PALPITATIONS: 1
HEADACHES: 1
NAUSEA: 1
CLAUDICATION: 0
ABDOMINAL PAIN: 0
DOUBLE VISION: 0
FEVER: 0
SHORTNESS OF BREATH: 1
LOSS OF CONSCIOUSNESS: 1
BLURRED VISION: 1
MYALGIAS: 1

## 2020-01-10 LAB — INR PPP: 3.2 (ref 2–3.5)

## 2020-01-15 ENCOUNTER — TELEPHONE (OUTPATIENT)
Dept: VASCULAR LAB | Facility: MEDICAL CENTER | Age: 60
End: 2020-01-15

## 2020-01-15 NOTE — TELEPHONE ENCOUNTER
Left message for pt to have INR checked  2nd call.  Christiane Caal, Clinical Pharmacist, CDE, CACP

## 2020-01-27 ENCOUNTER — TELEPHONE (OUTPATIENT)
Dept: VASCULAR LAB | Facility: MEDICAL CENTER | Age: 60
End: 2020-01-27

## 2020-02-13 ENCOUNTER — ANTICOAGULATION MONITORING (OUTPATIENT)
Dept: VASCULAR LAB | Facility: MEDICAL CENTER | Age: 60
End: 2020-02-13

## 2020-02-13 DIAGNOSIS — D68.2 FACTOR V DEFICIENCY (HCC): ICD-10-CM

## 2020-02-13 RX ORDER — WARFARIN SODIUM 3 MG/1
TABLET ORAL
Qty: 30 TAB | Refills: 0 | Status: SHIPPED | OUTPATIENT
Start: 2020-02-13 | End: 2020-06-15 | Stop reason: SDUPTHER

## 2020-02-18 DIAGNOSIS — Z79.01 CHRONIC ANTICOAGULATION: ICD-10-CM

## 2020-02-19 ENCOUNTER — ANTICOAGULATION MONITORING (OUTPATIENT)
Dept: VASCULAR LAB | Facility: MEDICAL CENTER | Age: 60
End: 2020-02-19

## 2020-02-19 LAB — INR PPP: 7.7 (ref 2–3.5)

## 2020-02-19 NOTE — PROGRESS NOTES
Anticoagulation Summary  As of 2020    INR goal:   2.5-3.5   TTR:   39.5 % (4.5 y)   INR used for dosin.70! (2020)   Warfarin maintenance plan:   4.5 mg (3 mg x 1.5) every day   Weekly warfarin total:   31.5 mg   Plan last modified:   Christiane M Filter (2020)   Next INR check:   2020   Target end date:   Indefinite    Indications    Factor V Leiden (HCC) (Resolved) [D68.51]  Deep vein thrombosis [453.40] (Resolved) [I82.409]  Pulmonary embolism [415.19] (Resolved) [I26.99]             Anticoagulation Episode Summary     INR check location:   Anticoagulation Clinic    Preferred lab:       Send INR reminders to:       Comments:   bello      Anticoagulation Care Providers     Provider Role Specialty Phone number    DERICK Diehl Referring Family Medicine [phone]    Eliseo Eduardo M.D. Responsible Cardiology [phone]    Deacon Novak, PharmD Responsible          Anticoagulation Patient Findings  Patient Findings     Negatives:   Signs/symptoms of thrombosis, Signs/symptoms of bleeding, Laboratory test error suspected, Change in health, Change in alcohol use, Change in activity, Upcoming invasive procedure, Emergency department visit, Upcoming dental procedure, Missed doses, Extra doses, Change in medications, Change in diet/appetite, Hospital admission, Bruising, Other complaints         Spoke with patient today regarding surpatherapeutic INR of 7.7.  Patient denies any signs/symptoms of bruising or bleeding or any changes in diet and medications.  Instructed patient to call clinic with any questions or concerns.  Instructed patient to HOLD X 2, then recheck INR.  Patient understands to seek emergency care for any bleeding/bruising out of ordinary.  Follow up in 2 days, to reduce risk of adverse events related to this high risk medication,  Warfarin.    Romero Gonzalez, RoxieD, BCACP

## 2020-02-28 ENCOUNTER — TELEPHONE (OUTPATIENT)
Dept: CARDIOLOGY | Facility: MEDICAL CENTER | Age: 60
End: 2020-02-28

## 2020-03-03 ENCOUNTER — HOSPITAL ENCOUNTER (OUTPATIENT)
Dept: LAB | Facility: MEDICAL CENTER | Age: 60
End: 2020-03-03
Attending: NURSE PRACTITIONER
Payer: MEDICARE

## 2020-03-03 DIAGNOSIS — E78.2 MIXED HYPERLIPIDEMIA: ICD-10-CM

## 2020-03-03 DIAGNOSIS — D68.51 FACTOR V LEIDEN (HCC): ICD-10-CM

## 2020-03-03 LAB
INR PPP: 3.18 (ref 0.87–1.13)
PROTHROMBIN TIME: 33.8 SEC (ref 12–14.6)

## 2020-03-03 PROCEDURE — 36415 COLL VENOUS BLD VENIPUNCTURE: CPT

## 2020-03-03 PROCEDURE — 80053 COMPREHEN METABOLIC PANEL: CPT

## 2020-03-03 PROCEDURE — 85610 PROTHROMBIN TIME: CPT

## 2020-03-03 PROCEDURE — 80061 LIPID PANEL: CPT

## 2020-03-04 ENCOUNTER — ANTICOAGULATION MONITORING (OUTPATIENT)
Dept: VASCULAR LAB | Facility: MEDICAL CENTER | Age: 60
End: 2020-03-04

## 2020-03-04 LAB
ALBUMIN SERPL BCP-MCNC: 3.6 G/DL (ref 3.2–4.9)
ALBUMIN/GLOB SERPL: 1.1 G/DL
ALP SERPL-CCNC: 112 U/L (ref 30–99)
ALT SERPL-CCNC: 12 U/L (ref 2–50)
ANION GAP SERPL CALC-SCNC: 12 MMOL/L (ref 0–11.9)
AST SERPL-CCNC: 15 U/L (ref 12–45)
BILIRUB SERPL-MCNC: 0.3 MG/DL (ref 0.1–1.5)
BUN SERPL-MCNC: 11 MG/DL (ref 8–22)
CALCIUM SERPL-MCNC: 8.6 MG/DL (ref 8.5–10.5)
CHLORIDE SERPL-SCNC: 108 MMOL/L (ref 96–112)
CHOLEST SERPL-MCNC: 140 MG/DL (ref 100–199)
CO2 SERPL-SCNC: 23 MMOL/L (ref 20–33)
CREAT SERPL-MCNC: 0.75 MG/DL (ref 0.5–1.4)
FASTING STATUS PATIENT QL REPORTED: NORMAL
GLOBULIN SER CALC-MCNC: 3.2 G/DL (ref 1.9–3.5)
GLUCOSE SERPL-MCNC: 98 MG/DL (ref 65–99)
HDLC SERPL-MCNC: 50 MG/DL
LDLC SERPL CALC-MCNC: 65 MG/DL
POTASSIUM SERPL-SCNC: 4.1 MMOL/L (ref 3.6–5.5)
PROT SERPL-MCNC: 6.8 G/DL (ref 6–8.2)
SODIUM SERPL-SCNC: 143 MMOL/L (ref 135–145)
TRIGL SERPL-MCNC: 126 MG/DL (ref 0–149)

## 2020-03-04 NOTE — PROGRESS NOTES
Anticoagulation Summary  As of 3/4/2020    INR goal:   2.5-3.5   TTR:   39.2 % (4.5 y)   INR used for dosing:   3.18 (3/3/2020)   Warfarin maintenance plan:   4.5 mg (3 mg x 1.5) every day   Weekly warfarin total:   31.5 mg   Plan last modified:   Christiane M Filter (2/13/2020)   Next INR check:   3/11/2020   Target end date:   Indefinite    Indications    Factor V Leiden (HCC) (Resolved) [D68.51]  Deep vein thrombosis [453.40] (Resolved) [I82.409]  Pulmonary embolism [415.19] (Resolved) [I26.99]             Anticoagulation Episode Summary     INR check location:   Anticoagulation Clinic    Preferred lab:       Send INR reminders to:       Comments:   bello      Anticoagulation Care Providers     Provider Role Specialty Phone number    DERICK Diehl Referring Family Medicine [phone]    Eliseo Eduardo M.D. Responsible Cardiology [phone]    Deacon Novak, PharmD Responsible          Anticoagulation Patient Findings  Patient Findings     Negatives:   Signs/symptoms of thrombosis, Signs/symptoms of bleeding, Laboratory test error suspected, Change in health, Change in alcohol use, Change in activity, Upcoming invasive procedure, Emergency department visit, Upcoming dental procedure, Missed doses, Extra doses, Change in medications, Change in diet/appetite, Hospital admission, Bruising, Other complaints         Spoke with patient today regarding therapeutic INR of 3.18.  Patient denies any signs/symptoms of bruising or bleeding or any changes in diet and medications.  Instructed patient to call clinic with any questions or concerns.  Pt is to continue with current warfarin dosing regimen.  Pt is to continue with current warfarin dosing regimen.  Follow up in 1 weeks, to reduce risk of adverse events related to this high risk medication,  Warfarin.    Romero Gonzalez, PharmD, BCACP

## 2020-03-05 ENCOUNTER — TELEPHONE (OUTPATIENT)
Dept: CARDIOLOGY | Facility: MEDICAL CENTER | Age: 60
End: 2020-03-05

## 2020-03-05 NOTE — TELEPHONE ENCOUNTER
Called patient and left VM on lab results. Will discuss lab results in clinic in 5 days. There are no immediate concerns at this time and can be addressed at upcoming follow up apt. SC

## 2020-03-05 NOTE — TELEPHONE ENCOUNTER
----- Message from MAYE Stevens sent at 3/4/2020  2:38 PM PST -----  CMP showed alk phos and anion gap elevation-recommend PCP review. Cholesterol under great control with CAD. Fu as planned in one week. SC

## 2020-03-10 ENCOUNTER — NON-PROVIDER VISIT (OUTPATIENT)
Dept: CARDIOLOGY | Facility: MEDICAL CENTER | Age: 60
End: 2020-03-10
Payer: MEDICARE

## 2020-03-10 ENCOUNTER — OFFICE VISIT (OUTPATIENT)
Dept: CARDIOLOGY | Facility: MEDICAL CENTER | Age: 60
End: 2020-03-10
Payer: MEDICARE

## 2020-03-10 VITALS
BODY MASS INDEX: 27.13 KG/M2 | DIASTOLIC BLOOD PRESSURE: 60 MMHG | SYSTOLIC BLOOD PRESSURE: 112 MMHG | OXYGEN SATURATION: 95 % | WEIGHT: 179 LBS | HEIGHT: 68 IN | RESPIRATION RATE: 16 BRPM | HEART RATE: 114 BPM

## 2020-03-10 DIAGNOSIS — R00.9 ABNORMAL HEART RATE: ICD-10-CM

## 2020-03-10 DIAGNOSIS — Z95.0 CARDIAC PACEMAKER IN SITU: ICD-10-CM

## 2020-03-10 DIAGNOSIS — I25.10 CORONARY ARTERIOSCLEROSIS: ICD-10-CM

## 2020-03-10 DIAGNOSIS — Z79.01 CHRONIC ANTICOAGULATION: ICD-10-CM

## 2020-03-10 DIAGNOSIS — Z95.828 S/P INSERTION OF IVC (INFERIOR VENA CAVAL) FILTER: ICD-10-CM

## 2020-03-10 DIAGNOSIS — I49.5 SICK SINUS SYNDROME (HCC): ICD-10-CM

## 2020-03-10 DIAGNOSIS — E78.2 MIXED HYPERLIPIDEMIA: ICD-10-CM

## 2020-03-10 DIAGNOSIS — D68.2 FACTOR V DEFICIENCY (HCC): ICD-10-CM

## 2020-03-10 LAB — EKG IMPRESSION: NORMAL

## 2020-03-10 PROCEDURE — 93000 ELECTROCARDIOGRAM COMPLETE: CPT | Performed by: INTERNAL MEDICINE

## 2020-03-10 PROCEDURE — 93280 PM DEVICE PROGR EVAL DUAL: CPT | Performed by: INTERNAL MEDICINE

## 2020-03-10 PROCEDURE — 99214 OFFICE O/P EST MOD 30 MIN: CPT | Performed by: NURSE PRACTITIONER

## 2020-03-10 RX ORDER — NITROGLYCERIN 400 UG/1
1 SPRAY ORAL PRN
Qty: 1 BOTTLE | Refills: 1 | Status: SHIPPED | OUTPATIENT
Start: 2020-03-10

## 2020-03-10 RX ORDER — METOPROLOL SUCCINATE 25 MG/1
12.5 TABLET, EXTENDED RELEASE ORAL EVERY EVENING
Qty: 45 TAB | Refills: 3 | Status: SHIPPED | OUTPATIENT
Start: 2020-03-10

## 2020-03-10 RX ORDER — IPRATROPIUM BROMIDE 21 UG/1
SPRAY, METERED NASAL
COMMUNITY
Start: 2020-01-05

## 2020-03-10 RX ORDER — FENTANYL 25 UG/1
PATCH TRANSDERMAL
COMMUNITY
Start: 2020-02-12 | End: 2020-03-10

## 2020-03-10 RX ORDER — AMITRIPTYLINE HYDROCHLORIDE 25 MG/1
TABLET, FILM COATED ORAL
COMMUNITY
Start: 2020-02-11 | End: 2021-01-01 | Stop reason: SDUPTHER

## 2020-03-10 RX ORDER — ATORVASTATIN CALCIUM 20 MG/1
TABLET, FILM COATED ORAL DAILY
COMMUNITY
Start: 2019-12-19

## 2020-03-10 ASSESSMENT — ENCOUNTER SYMPTOMS
FEVER: 0
SHORTNESS OF BREATH: 0
ABDOMINAL PAIN: 0
DIZZINESS: 0
WEAKNESS: 0
PND: 0
ORTHOPNEA: 0
COUGH: 1
PALPITATIONS: 1
MYALGIAS: 0
CLAUDICATION: 0

## 2020-03-10 NOTE — PROGRESS NOTES
Chief Complaint   Patient presents with   • Follow-Up     Subjective:   Phyllis Perez is a 57 y.o. female who presents today for 6 month follow up on the history below.    She is a prior patient of Dr. Butts in our office, she will re-establish with Dr. Redd or Dr. Trivedi in our office.    Hx of CAD (normal PET in '15), SSS with PPM placement, Factor V Leiden with IVC filter for hx of DVT/PE on coumadin, COPD on oxygen, HLD, and prior uterine cancer.    She is in the appointment with her dog and has a walker for mobility.    Her COPD has been stable.    She states she did use her nitro spray in December but can't remember why.    She has no cardiac complaints except palpitations with exertion.    Past Medical History:   Diagnosis Date   • Anemia    • Anticoagulation monitoring, special range     coumadin therapy   • CAD (coronary artery disease)     Old MI/POBA   • Clotting disorder (HCC)     Factor V Leiden, IVC filter for hx of DVT, PE   • Emphysema (subcutaneous) (surgical) resulting from a procedure     asthma   • Hot flashes    • Hyperlipemia, mixed    • Old MI (myocardial infarction)     Treated with POBA; Cath without obstructive DZ 2009   • Ovarian cancer (HCC)    • Sleep apnea     CPAP   • SSS (sick sinus syndrome) (HCC)     Tx with pacer   • Uterine cancer (HCC)      Past Surgical History:   Procedure Laterality Date   • CERVICAL FUSION POSTERIOR  4/12/2017    Procedure: CERVICAL FUSION POSTERIOR- WITH INSTRUMENTATION C4-5, C5-6;  Surgeon: Alejandro Rayo M.D.;  Location: SURGERY [facility];  Service:    • PACEMAKER INSERTION  2010    bradycardia   • ANGIOPLASTY      ?2004   • CHOLECYSTECTOMY     • ELBOW EXPLORATION      cts   • HYSTERECTOMY LAPAROSCOPY     • KNEE REPLACEMENT, TOTAL     • KNEE REVISION TOTAL     • LUMBAR EXPLORATION      buck   • NASAL RECONSTRUCTION     • OOPHORECTOMY     • OTHER SURGICAL PROCEDURE      IVC filter placement   • SHOULDER ARTHROPLASTY TOTAL REVERSED     • ZZZ  CARDIAC CATH       Family History   Problem Relation Age of Onset   • Cancer Mother    • Cancer Father    • Cancer Maternal Aunt    • Cancer Maternal Uncle      Social History     Socioeconomic History   • Marital status:      Spouse name: Not on file   • Number of children: Not on file   • Years of education: Not on file   • Highest education level: Not on file   Occupational History   • Not on file   Social Needs   • Financial resource strain: Not on file   • Food insecurity     Worry: Not on file     Inability: Not on file   • Transportation needs     Medical: Not on file     Non-medical: Not on file   Tobacco Use   • Smoking status: Former Smoker     Years: 15.00     Last attempt to quit: 7/10/2001     Years since quittin.6   • Smokeless tobacco: Never Used   Substance and Sexual Activity   • Alcohol use: Yes     Comment: Notes intermittent alcohol use   • Drug use: No   • Sexual activity: Not Currently     Partners: Male   Lifestyle   • Physical activity     Days per week: Not on file     Minutes per session: Not on file   • Stress: Not on file   Relationships   • Social connections     Talks on phone: Not on file     Gets together: Not on file     Attends Jew service: Not on file     Active member of club or organization: Not on file     Attends meetings of clubs or organizations: Not on file     Relationship status: Not on file   • Intimate partner violence     Fear of current or ex partner: Not on file     Emotionally abused: Not on file     Physically abused: Not on file     Forced sexual activity: Not on file   Other Topics Concern   • Not on file   Social History Narrative   • Not on file     Allergies   Allergen Reactions   • Bactrim [Sulfamethoxazole W-Trimethoprim]      constipation   • Morphine      Severe HA   • Other Misc Hives     cloraprep   • Tape      Blistering and then pop     Outpatient Encounter Medications as of 3/10/2020   Medication Sig Dispense Refill   • atorvastatin  (LIPITOR) 20 MG Tab Take  by mouth every day. Take 1 tablet by mouth daily     • amitriptyline (ELAVIL) 25 MG Tab TK 1 TO 4 TS PO QHS     • ipratropium (ATROVENT) 0.03 % Solution INSTILL 2 SPRAYS INTO EACH NOSTRIL BID OR TID     • nitroglycerin (NITROLINGUAL) 0.4 MG/SPRAY Solution Place 1 Spray under tongue as needed (chest pain). 1 Bottle 1   • metoprolol SR (TOPROL XL) 25 MG TABLET SR 24 HR Take 0.5 Tabs by mouth every evening. 45 Tab 3   • warfarin (COUMADIN) 3 MG Tab Take one and one-half tablets daily as directed. 30 Tab 0   • ezetimibe (ZETIA) 10 MG Tab Take 1 Tab by mouth every day. 90 Tab 3   • potassium chloride ER (KLOR-CON) 10 MEQ tablet Take 1 Tab by mouth every day.  5   • ropinirole (REQUIP) 4 MG tablet Take 4 mg by mouth every evening.     • temazepam (RESTORIL) 30 MG capsule Take 1 Cap by mouth at bedtime as needed for Sleep. Fill at monthly intervals or greater. 30 Cap 0   • fentanyl (DURAGESIC) 100 MCG/HR PATCH 72 HR Apply 1 Patch to skin as directed every 72 hours.  0   • ADVAIR DISKUS 250-50 MCG/DOSE AEROSOL POWDER, BREATH ACTIVATED Inhale 1 Puff by mouth every day.  11   • hydrocodone/acetaminophen (NORCO)  MG Tab Take 1 tablet by mouth every 4 hours as needed for breakthrough pain. The earliest date the pharmacy may fill the rx is 7/18/2016. 180 Tab 0   • carisoprodol (SOMA) 350 MG Tab Take 1 Tab by mouth every 8 hours as needed for Muscle Spasms. Fill at monthly intervals or greater. 90 Tab 0   • aspirin (ASA) 81 MG Chew Tab chewable tablet Take 81 mg by mouth every day.     • albuterol (VENTOLIN OR PROVENTIL) 108 (90 BASE) MCG/ACT AERS inhalation aerosol Inhale 2 Puffs by mouth every 6 hours as needed for Shortness of Breath. 8.5 g 3   • fluticasone (FLONASE) 50 MCG/ACT nasal spray Spray 1 Spray in nose every day. 16 g 11   • Loratadine (CLARITIN) 10 MG CAPS Take 1 Cap by mouth every day. 30 Cap 11   • ferrous sulfate 325 (65 FE) MG tablet Take 1 Tab by mouth every day. 30 Tab 3   •  "[DISCONTINUED] fentaNYL (DURAGESIC) 25 MCG/HR PATCH 72 HR APPLY 1 PATCH TO THE SKIN Q 72 HOURS     • [DISCONTINUED] atorvastatin (LIPITOR) 40 MG Tab TAKE 1 TABLET BY MOUTH EVERY DAY (Patient not taking: Reported on 3/10/2020) 90 Tab 3   • [DISCONTINUED] enoxaparin (LOVENOX) 100 MG/ML Solution inj Inject 100 mg as instructed every day. 10 Syringe 0   • [DISCONTINUED] metoprolol SR (TOPROL XL) 25 MG TABLET SR 24 HR Take 0.5 Tabs by mouth every evening. 45 Tab 3   • [DISCONTINUED] nitroglycerin (NITROSTAT) 0.4 MG SL Tab Place 1 Tab under tongue as needed for Chest Pain (As needed for chest pain). Up to 3 tablets every 5 minutes 25 Tab 2     No facility-administered encounter medications on file as of 3/10/2020.      Review of Systems   Constitutional: Negative for fever and malaise/fatigue.   Respiratory: Positive for cough. Negative for shortness of breath.    Cardiovascular: Positive for palpitations. Negative for chest pain, orthopnea, claudication, leg swelling and PND.        HR speeds up when doing anything    Gastrointestinal: Negative for abdominal pain.   Musculoskeletal: Negative for myalgias.   Neurological: Negative for dizziness and weakness.        Objective:   /60 (BP Location: Left arm, Patient Position: Sitting, BP Cuff Size: Adult)   Pulse (!) 114   Resp 16   Ht 1.727 m (5' 8\")   Wt 81.2 kg (179 lb)   SpO2 95%   BMI 27.22 kg/m²     Physical Exam   Constitutional: She is oriented to person, place, and time. She appears well-developed and well-nourished. She appears ill.   HENT:   Head: Normocephalic and atraumatic.   Eyes: EOM are normal.   Neck: No JVD present.   Cardiovascular: Normal rate, regular rhythm, normal heart sounds and intact distal pulses.   Pacemaker present in left upper chest without evidence   of erosion, drainage,or erythema.     Pulmonary/Chest: Effort normal and breath sounds normal.   Musculoskeletal:         General: No edema.      Comments: Walker for mobility  "   Neurological: She is alert and oriented to person, place, and time.   Skin: Skin is warm and dry.   Nursing note and vitals reviewed.      Assessment:     1. Sick sinus syndrome (HCC)  EKG    EKG   2. Abnormal heart rate  EKG    EKG   3. S/P insertion of IVC (inferior vena caval) filter     4. Mixed hyperlipidemia     5. Factor V deficiency (CMS-HCC): With history of DVT and pulmonary emboli     6. Coronary arteriosclerosis     7. Chronic anticoagulation     8. Cardiac pacemaker in situ       Medical Decision Making:  Today's Assessment / Status / Plan:     1. CAD with prior MI  -no prior cath records in chart, unable to obtain  -last cardiac PET was in '15 negative for ischemia  -cont asa, statin  -follow symptoms clinically, no angina, PAUL chronic with COPD  -she will call if she has to take nitro again  -echo in '18 with good EF    2. HLD  -statin, good control  -LDL goal <70 with CAD  -cont lipitor 80 mg QPM with zetia 10 mg     3. PSVT with SSS and PPM placement  -rates elevated but didn't start toprol after our last apt  -recommend start toprol 12.5 mg QPM to help with palpitations and ST  -call if doesn't improve symptoms    4. Factor V Leiden with prior hx of DVT/PE and IVC filter  -coumadin with anti-coag clinic  -tolerating well    5. COPD  -managed with oxygen and inhalers, stable per patient    FU in clinic in 6 months with MD with PPM check    Patient verbalizes understanding and agrees with the plan of care.     Collaborating MD: Jaylen OLIVARES

## 2020-03-12 ENCOUNTER — TELEPHONE (OUTPATIENT)
Dept: CARDIOLOGY | Facility: MEDICAL CENTER | Age: 60
End: 2020-03-12

## 2020-03-12 NOTE — TELEPHONE ENCOUNTER
Message   Received: Yesterday   Message Contents   Misa Acevedo, Med Ass't  Minna Ramirez R.N.             The patient's plan does not cover the spray   nitroglycerin (NITROLINGUAL) 0.4 MG/SPRAY Solution     Is there a medical reason that the sublingual tablets are not okay?      -------------------------------------------------------    Contacted the patient to get more information. She said that she requested and prefers the Nitro spray because she said its just easier than the tablets. Explained that she can call the pharmacy and see how much it would be to pay for out of pocket. She would like to do this. She will call or have pharmacy send another request if she indeed needs to tablets.

## 2020-04-01 ENCOUNTER — TELEPHONE (OUTPATIENT)
Dept: VASCULAR LAB | Facility: MEDICAL CENTER | Age: 60
End: 2020-04-01

## 2020-04-02 NOTE — TELEPHONE ENCOUNTER
Left message for pt to have INR checked  Christiane Filter, Clinical Pharmacist, CDE, CACP     Subjective   Dorita Lara is a 34 y.o. female.   This was an audio and video enabled telemedicine encounter.  History of Present Illness   She is still working on weight loss; weight is down  Dorita Lara 31 y.o. female who presents for ADD/ADHD follow up. They are well controlled on their current Rx.  No new problems with insomnia, tachycardia,or note weight is down with diet and exercise.  The patient has read and signed the [facility] Controlled Substance Contract.  I will continue to see patient for regular follow up appointments and give the lowest effective dose.  They are well controlled on their medication at the lowest effective dose.  She has a previous history of a diagnosis of  ADD/ADHD.   SHARMAINE has been reviewed by me and is updated every 3 months. The patient is aware of the potential for addiction and dependence.  The Rx continues to help them concentrate, finish tasks in timely manor, and succeed.  She denies current suicidal and homicidal ideation.  Reviewed causes for potential of discontinuation of stimulant medication,  including but not limited to consideration for diversion, obtaining other controlled substances from other license practitioners, or  inappropriate office behavior.  Patient understands to use a controlled substance as prescribed by physician and to avoid improper use of controlled substances.  Patient will also verbalized understanding that prescriptions to be filled at the same pharmacy  unless office staff is made aware of this.  Patient understands they can be submitted to random urine drug screens and/or random pill counts on any request.  Patient understands they are not to receive early refills.  The patient must produce an official police report for any effort to replace controlled substances that are lost or stolen.  No use of illegal street drugs while receiving controlled substances from this prescribing provider.  The patient is to take medication as  prescribed  and not deviate from the normal schedule without consultation from the provider.  Patient is not to share the medication with others or to take medication with alcohol or other sedatives.  Use caution when driving or operating machinery.  Must always keep the prescription in the original container.  Patient is to store controlled substances in a locked cabinet or other secure storage unit that is cool, dry and out of sunlight.  Patient must immediately notify office if this controlled substances is  improperly taken by another individual.  Reviewed risk for physical dependence, tolerance and addiction.    There is no evidence of aberrant behavior or any red flags.  .       Stop Adderall if ever + HCG  Weight is coming down with diet and exercise     The following portions of the patient's history were reviewed and updated as appropriate: allergies, current medications, past family history, past medical history, past social history, past surgical history and problem list.    Review of Systems   Constitutional: Negative for activity change, appetite change, fatigue and unexpected weight change.   HENT: Negative for nosebleeds and trouble swallowing.    Eyes: Negative for pain and visual disturbance.   Respiratory: Negative for chest tightness, shortness of breath and wheezing.    Cardiovascular: Negative for chest pain and palpitations.   Gastrointestinal: Negative for abdominal pain and blood in stool.   Endocrine: Negative.    Genitourinary: Negative for difficulty urinating and hematuria.   Musculoskeletal: Negative for joint swelling.   Skin: Negative for color change and rash.   Allergic/Immunologic: Negative.    Neurological: Negative for syncope and speech difficulty.   Hematological: Negative for adenopathy.   Psychiatric/Behavioral: Negative for agitation, confusion, dysphoric mood, sleep disturbance and suicidal ideas. The patient is not nervous/anxious.    All other systems reviewed and are  negative.      Objective   Physical Exam   Constitutional: She is oriented to person, place, and time. She appears well-developed and well-nourished. No distress.   HENT:   Head: Normocephalic.   Eyes: Pupils are equal, round, and reactive to light.   Neck: Normal range of motion.   Pulmonary/Chest: Effort normal.   Musculoskeletal: Normal range of motion.   Neurological: She is alert and oriented to person, place, and time.   Skin: Skin is dry. She is not diaphoretic.   Psychiatric: She has a normal mood and affect. Her behavior is normal. Judgment and thought content normal. Her affect is not inappropriate. She is not actively hallucinating. Cognition and memory are normal.   Nursing note and vitals reviewed.      Assessment/Plan   Diagnoses and all orders for this visit:    Attention deficit disorder, unspecified hyperactivity presence       Time spent 10 minutes

## 2020-04-21 ENCOUNTER — ANTICOAGULATION MONITORING (OUTPATIENT)
Dept: VASCULAR LAB | Facility: MEDICAL CENTER | Age: 60
End: 2020-04-21

## 2020-04-21 ENCOUNTER — TELEPHONE (OUTPATIENT)
Dept: VASCULAR LAB | Facility: MEDICAL CENTER | Age: 60
End: 2020-04-21

## 2020-04-21 LAB — INR PPP: 3.6 (ref 2–3.5)

## 2020-04-21 NOTE — TELEPHONE ENCOUNTER
Left message for pt to have INR checked  2nd call  Christiane Caal, Clinical Pharmacist, CDE, CACP

## 2020-04-21 NOTE — PROGRESS NOTES
Anticoagulation Summary  As of 4/21/2020    INR goal:   2.5-3.5   TTR:   40.3 % (4.7 y)   INR used for dosing:   3.60! (4/21/2020)   Warfarin maintenance plan:   4.5 mg (3 mg x 1.5) every day   Weekly warfarin total:   31.5 mg   Plan last modified:   Christiane M Filter (2/13/2020)   Next INR check:   5/5/2020   Target end date:   Indefinite    Indications    Factor V Leiden (HCC) (Resolved) [D68.51]  Deep vein thrombosis [453.40] (Resolved) [I82.409]  Pulmonary embolism [415.19] (Resolved) [I26.99]             Anticoagulation Episode Summary     INR check location:   Anticoagulation Clinic    Preferred lab:       Send INR reminders to:       Comments:   bello      Anticoagulation Care Providers     Provider Role Specialty Phone number    DERICK Diehl Referring Family Medicine [phone]    Eliseo Eduardo M.D. Responsible Cardiology [phone]    Deacon Novak, PharmD Responsible          Anticoagulation Patient Findings  Patient Findings     Negatives:   Signs/symptoms of thrombosis, Signs/symptoms of bleeding, Laboratory test error suspected, Change in health, Change in alcohol use, Change in activity, Upcoming invasive procedure, Emergency department visit, Upcoming dental procedure, Missed doses, Extra doses, Change in medications, Change in diet/appetite, Hospital admission, Bruising, Other complaints        Spoke with patient today regarding supratherapeutic INR of 3.6.  Patient denies any signs/symptoms of bruising or bleeding or any changes in diet and medications.  Instructed patient to call clinic with any questions or concerns.  Instructed patient to decrease today's dose to 3mg, then resume current warfarin regimen.  Follow up in 2 weeks, to reduce risk of adverse events related to this high risk medication,  Warfarin.    Romero Gonzalez, RoxieD, BCACP

## 2020-05-19 ENCOUNTER — TELEPHONE (OUTPATIENT)
Dept: VASCULAR LAB | Facility: MEDICAL CENTER | Age: 60
End: 2020-05-19

## 2020-06-01 ENCOUNTER — ANTICOAGULATION MONITORING (OUTPATIENT)
Dept: VASCULAR LAB | Facility: MEDICAL CENTER | Age: 60
End: 2020-06-01

## 2020-06-01 LAB — INR PPP: 2.6 (ref 2–3.5)

## 2020-06-01 NOTE — PROGRESS NOTES
Anticoagulation Summary  As of 2020    INR goal:   2.5-3.5   TTR:   41.4 % (4.8 y)   INR used for dosin.60 (2020)   Warfarin maintenance plan:   4.5 mg (3 mg x 1.5) every day   Weekly warfarin total:   31.5 mg   Plan last modified:   Felix Bailey PharmD (2020)   Next INR check:   2020   Target end date:   Indefinite    Indications    Factor V Leiden (HCC) (Resolved) [D68.51]  Deep vein thrombosis [453.40] (Resolved) [I82.409]  Pulmonary embolism [415.19] (Resolved) [I26.99]             Anticoagulation Episode Summary     INR check location:   Anticoagulation Clinic    Preferred lab:       Send INR reminders to:       Comments:   bello      Anticoagulation Care Providers     Provider Role Specialty Phone number    EDRICK Diehl Referring Family Medicine [phone]    Eliseo Eduardo M.D. Responsible Cardiology [phone]    Roxie IrvingD Responsible          Anticoagulation Patient Findings        Spoke to patient on the phone.   INR  therapeutic.   Denies signs/symptoms of bleeding and/or thrombosis.   Denies changes to diet or medications.   Follow up appointment in 6 week(s).    Continue weekly warfarin dose as noted      Felix Bailey PharmD, MS, BCACP, LCC    This note was created using voice recognition software (Dragon). The accuracy of the dictation is limited by the abilities of the software. I have reviewed the note prior to signing, however some errors in grammar and context are still possible. If you have any questions related to this note please do not hesitate to contact our office.

## 2020-06-15 DIAGNOSIS — D68.2 FACTOR V DEFICIENCY (HCC): ICD-10-CM

## 2020-06-15 RX ORDER — WARFARIN SODIUM 3 MG/1
TABLET ORAL
Qty: 135 TAB | Refills: 1 | Status: SHIPPED | OUTPATIENT
Start: 2020-06-15 | End: 2021-01-01 | Stop reason: SDUPTHER

## 2020-07-28 LAB
INR PPP: 2.5 (ref 2–3.5)
INR PPP: 2.5 (ref 2–3.5)

## 2020-08-04 ENCOUNTER — NON-PROVIDER VISIT (OUTPATIENT)
Dept: CARDIOLOGY | Facility: MEDICAL CENTER | Age: 60
End: 2020-08-04
Payer: MEDICARE

## 2020-08-04 ENCOUNTER — TELEPHONE (OUTPATIENT)
Dept: VASCULAR LAB | Facility: MEDICAL CENTER | Age: 60
End: 2020-08-04

## 2020-08-04 ENCOUNTER — OFFICE VISIT (OUTPATIENT)
Dept: CARDIOLOGY | Facility: MEDICAL CENTER | Age: 60
End: 2020-08-04
Payer: MEDICARE

## 2020-08-04 VITALS
HEIGHT: 68 IN | BODY MASS INDEX: 29.2 KG/M2 | DIASTOLIC BLOOD PRESSURE: 78 MMHG | HEART RATE: 100 BPM | OXYGEN SATURATION: 96 % | SYSTOLIC BLOOD PRESSURE: 94 MMHG | WEIGHT: 192.68 LBS | RESPIRATION RATE: 12 BRPM

## 2020-08-04 DIAGNOSIS — R07.89 OTHER CHEST PAIN: ICD-10-CM

## 2020-08-04 DIAGNOSIS — Z95.0 CARDIAC PACEMAKER IN SITU: ICD-10-CM

## 2020-08-04 DIAGNOSIS — R06.02 SHORTNESS OF BREATH: ICD-10-CM

## 2020-08-04 DIAGNOSIS — E78.2 MIXED HYPERLIPIDEMIA: ICD-10-CM

## 2020-08-04 DIAGNOSIS — I25.10 CORONARY ARTERIOSCLEROSIS: ICD-10-CM

## 2020-08-04 PROCEDURE — 99214 OFFICE O/P EST MOD 30 MIN: CPT | Performed by: INTERNAL MEDICINE

## 2020-08-04 PROCEDURE — 93280 PM DEVICE PROGR EVAL DUAL: CPT | Performed by: INTERNAL MEDICINE

## 2020-08-04 ASSESSMENT — ENCOUNTER SYMPTOMS
PALPITATIONS: 0
CONSTITUTIONAL NEGATIVE: 1
CHILLS: 0
SHORTNESS OF BREATH: 1
DEPRESSION: 0
DOUBLE VISION: 0
COUGH: 0
HEADACHES: 0
MYALGIAS: 0
NEUROLOGICAL NEGATIVE: 1
CLAUDICATION: 0
MUSCULOSKELETAL NEGATIVE: 1
EYES NEGATIVE: 1
GASTROINTESTINAL NEGATIVE: 1
NERVOUS/ANXIOUS: 0
WEAKNESS: 0
ABDOMINAL PAIN: 0
VOMITING: 0
FOCAL WEAKNESS: 0
WEIGHT LOSS: 0
PSYCHIATRIC NEGATIVE: 1
DIZZINESS: 0
NAUSEA: 0
BRUISES/BLEEDS EASILY: 0
BLURRED VISION: 0
FEVER: 0

## 2020-08-04 NOTE — PROGRESS NOTES
Chief Complaint   Patient presents with   • Hyperlipidemia     PP of [place]       Subjective:   Phyllis Perez is a 60 y.o. female who presents today for follow up of coronary artery disease.    Since the patient's last visit on 03/10/20 with Chastity Henderson, she has been doing well clinically. She has chest pain and shortness of breath due to chronic obstructive pulmonary disease. She denies palpitations, nausea/vomiting or diaphoresis. She was previously seen in our office by Brain Owens.    Past Medical History:   Diagnosis Date   • Anemia    • Anticoagulation monitoring, special range     coumadin therapy   • CAD (coronary artery disease)     Old MI/POBA   • Clotting disorder (HCC)     Factor V Leiden, IVC filter for hx of DVT, PE   • Emphysema (subcutaneous) (surgical) resulting from a procedure     asthma   • Hot flashes    • Hyperlipemia, mixed    • Old MI (myocardial infarction)     Treated with POBA; Cath without obstructive DZ 2009   • Ovarian cancer (HCC)    • Sleep apnea     CPAP   • SSS (sick sinus syndrome) (HCC)     Tx with pacer   • Uterine cancer (HCC)      Past Surgical History:   Procedure Laterality Date   • CERVICAL FUSION POSTERIOR  4/12/2017    Procedure: CERVICAL FUSION POSTERIOR- WITH INSTRUMENTATION C4-5, C5-6;  Surgeon: Alejandro Rayo M.D.;  Location: SURGERY [facility];  Service:    • PACEMAKER INSERTION  2010    bradycardia   • ANGIOPLASTY      ?2004   • CHOLECYSTECTOMY     • ELBOW EXPLORATION      cts   • HYSTERECTOMY LAPAROSCOPY     • KNEE REPLACEMENT, TOTAL     • KNEE REVISION TOTAL     • LUMBAR EXPLORATION      buck   • NASAL RECONSTRUCTION     • OOPHORECTOMY     • OTHER SURGICAL PROCEDURE      IVC filter placement   • SHOULDER ARTHROPLASTY TOTAL REVERSED     • ZZZ CARDIAC CATH       Family History   Problem Relation Age of Onset   • Cancer Mother    • Cancer Father    • Cancer Maternal Aunt    • Cancer Maternal Uncle    • Heart Disease Neg Hx      Social History      Socioeconomic History   • Marital status:      Spouse name: Not on file   • Number of children: Not on file   • Years of education: Not on file   • Highest education level: Not on file   Occupational History   • Not on file   Social Needs   • Financial resource strain: Not on file   • Food insecurity     Worry: Not on file     Inability: Not on file   • Transportation needs     Medical: Not on file     Non-medical: Not on file   Tobacco Use   • Smoking status: Former Smoker     Years: 15.00     Last attempt to quit: 7/10/2001     Years since quittin.0   • Smokeless tobacco: Never Used   Substance and Sexual Activity   • Alcohol use: Yes     Comment: Notes intermittent alcohol use   • Drug use: No   • Sexual activity: Not Currently     Partners: Male   Lifestyle   • Physical activity     Days per week: Not on file     Minutes per session: Not on file   • Stress: Not on file   Relationships   • Social connections     Talks on phone: Not on file     Gets together: Not on file     Attends Gnosticist service: Not on file     Active member of club or organization: Not on file     Attends meetings of clubs or organizations: Not on file     Relationship status: Not on file   • Intimate partner violence     Fear of current or ex partner: Not on file     Emotionally abused: Not on file     Physically abused: Not on file     Forced sexual activity: Not on file   Other Topics Concern   • Not on file   Social History Narrative   • Not on file     Allergies   Allergen Reactions   • Bactrim [Sulfamethoxazole W-Trimethoprim]      constipation   • Morphine      Severe HA   • Other Misc Hives     cloraprep   • Tape      Blistering and then pop     (Medications reviewed.)  Outpatient Encounter Medications as of 2020   Medication Sig Dispense Refill   • warfarin (COUMADIN) 3 MG Tab Take one and one-half tablets daily as directed by [facility] anticoagulation services 135 Tab 1   • atorvastatin (LIPITOR) 20 MG Tab Take   by mouth every day. Take 1 tablet by mouth daily     • amitriptyline (ELAVIL) 25 MG Tab TK 1 TO 4 TS PO QHS     • ipratropium (ATROVENT) 0.03 % Solution INSTILL 2 SPRAYS INTO EACH NOSTRIL BID OR TID     • nitroglycerin (NITROLINGUAL) 0.4 MG/SPRAY Solution Place 1 Spray under tongue as needed (chest pain). 1 Bottle 1   • metoprolol SR (TOPROL XL) 25 MG TABLET SR 24 HR Take 0.5 Tabs by mouth every evening. 45 Tab 3   • ezetimibe (ZETIA) 10 MG Tab Take 1 Tab by mouth every day. 90 Tab 3   • potassium chloride ER (KLOR-CON) 10 MEQ tablet Take 1 Tab by mouth every day.  5   • ropinirole (REQUIP) 4 MG tablet Take 4 mg by mouth every evening.     • temazepam (RESTORIL) 30 MG capsule Take 1 Cap by mouth at bedtime as needed for Sleep. Fill at monthly intervals or greater. 30 Cap 0   • fentanyl (DURAGESIC) 100 MCG/HR PATCH 72 HR Apply 1 Patch to skin as directed every 72 hours.  0   • ADVAIR DISKUS 250-50 MCG/DOSE AEROSOL POWDER, BREATH ACTIVATED Inhale 1 Puff by mouth every day.  11   • hydrocodone/acetaminophen (NORCO)  MG Tab Take 1 tablet by mouth every 4 hours as needed for breakthrough pain. The earliest date the pharmacy may fill the rx is 7/18/2016. 180 Tab 0   • carisoprodol (SOMA) 350 MG Tab Take 1 Tab by mouth every 8 hours as needed for Muscle Spasms. Fill at monthly intervals or greater. 90 Tab 0   • aspirin (ASA) 81 MG Chew Tab chewable tablet Take 81 mg by mouth every day.     • albuterol (VENTOLIN OR PROVENTIL) 108 (90 BASE) MCG/ACT AERS inhalation aerosol Inhale 2 Puffs by mouth every 6 hours as needed for Shortness of Breath. 8.5 g 3   • fluticasone (FLONASE) 50 MCG/ACT nasal spray Spray 1 Spray in nose every day. 16 g 11   • Loratadine (CLARITIN) 10 MG CAPS Take 1 Cap by mouth every day. 30 Cap 11   • ferrous sulfate 325 (65 FE) MG tablet Take 1 Tab by mouth every day. 30 Tab 3     No facility-administered encounter medications on file as of 8/4/2020.      Review of Systems   Constitutional:  "Negative.  Negative for chills, fever, malaise/fatigue and weight loss.   HENT: Negative.  Negative for hearing loss.    Eyes: Negative.  Negative for blurred vision and double vision.   Respiratory: Positive for shortness of breath. Negative for cough.    Cardiovascular: Positive for chest pain. Negative for palpitations, claudication and leg swelling.   Gastrointestinal: Negative.  Negative for abdominal pain, nausea and vomiting.   Genitourinary: Negative.  Negative for dysuria and urgency.   Musculoskeletal: Negative.  Negative for joint pain and myalgias.   Skin: Negative.  Negative for itching and rash.   Neurological: Negative.  Negative for dizziness, focal weakness, weakness and headaches.   Endo/Heme/Allergies: Negative.  Does not bruise/bleed easily.   Psychiatric/Behavioral: Negative.  Negative for depression. The patient is not nervous/anxious.         Objective:   BP (!) 94/78 (BP Location: Left arm, Patient Position: Sitting, BP Cuff Size: Adult)   Pulse 100   Resp 12   Ht 1.727 m (5' 8\")   Wt 87.4 kg (192 lb 10.9 oz)   SpO2 96%   BMI 29.30 kg/m²     Physical Exam   Constitutional: She is oriented to person, place, and time. She appears well-developed and well-nourished.   Using walker.   HENT:   Head: Normocephalic and atraumatic.   Eyes: EOM are normal.   Neck: No JVD present.   Cardiovascular: Normal rate, regular rhythm and normal heart sounds.   Pulmonary/Chest: Effort normal and breath sounds normal.   Abdominal: Soft. Bowel sounds are normal.   No hepatosplenomegaly.   Musculoskeletal: Normal range of motion.   Lymphadenopathy:     She has no cervical adenopathy.   Neurological: She is alert and oriented to person, place, and time.   Skin: Skin is warm and dry.   Psychiatric: She has a normal mood and affect.     CARDIAC STUDIES/PROCEDURES:    CAROTID ULTRASOUND (11/02/15)  Minimal atherosclerotic plaque involving the right internal carotid artery.   No evidence of a hemodynamically " significant stenosis.  No significant atherosclerotic plaque involving the left internal carotid artery.   No evidence of a hemodynamically significant stenosis.   Normal antegrade vertebral artery flow bilaterally.  (study result reviewed)    ECHOCARDIOGRAM CONCLUSIONS (04/26/18)  Normal left ventricular chamber size.    Normal left ventricular wall thickness.   Mild mid septal hypokinesis.   Left ventricular ejection fraction is visually estimated to be 55%.     Grade I diastolic dysfunction.  Compared to the images of the prior study done 9/2015 -    there has been no significant change.   (study result reviewed)    EKG performed on (03/10/20) was reviewed: EKG personally interpreted shows sinus rhythm with early R/S transition.    Laboratory results of (03/03/20) were reviewed. Cholesterol profile of 140/126/50/65 noted.    PACEMAKER PLACEMENT in [place]   [facility]    PET SCAN (10/21/15)  1.  Normal PET myocardial stress perfusion test for ischemia.  2.  No arrhythmias.  3.  Preserved ejection fraction 74%.  (study result reviewed)    TILT TABLE EVALUATION (09/12/18)  1.  Symptomatic bradycardia and sinus tachycardia with tilt table.  No syncope   appreciated.  2.  Above findings are likely compatible with vasovagal reaction.  (study result reviewed)    Assessment:     1. Coronary arteriosclerosis     2. Other chest pain     3. Shortness of breath     4. Mixed hyperlipidemia     5. Cardiac pacemaker in situ       Medical Decision Making:  Today's Assessment / Status / Plan:     1. Coronary artery disease (Remote diagnosis in [place]) with chest pain: She is experiencing chest pain and shortness of breathas described above. We will perform a myocardial perfusion imaging study and an echocardiogram and follow up with her. I will continue with current medical care including metoprolol and atorvastatin.  2. Hyperlipidemia: She is doing well on statin therapy without myalgia symptoms.  3. Pacemaker  placement: The pacemake is functioning well.  We will continue with device interrogation.  4. Factor V Leiden with IVC filter and chronic anticoagulation therapy.  5. Chronic obstructive pulmonary disease on chronic oxygen therapy (3 L/min)    We will follow up after her tests to reassess his symptoms.    CC Polina Rowell

## 2020-08-06 NOTE — TELEPHONE ENCOUNTER
Patient informed that yes she can bring in her surgical clearance form to be addressed at her office visit.   Biopsy Type: H and E

## 2020-08-07 ENCOUNTER — ANTICOAGULATION MONITORING (OUTPATIENT)
Dept: VASCULAR LAB | Facility: MEDICAL CENTER | Age: 60
End: 2020-08-07

## 2020-08-07 NOTE — PROGRESS NOTES
OP   Telephone Anticoagulation Service Note      Anticoagulation Summary  As of 2020    INR goal:  2.5-3.5   TTR:  43.3 % (4.9 y)   INR used for dosin.50 (2020)   Warfarin maintenance plan:  4.5 mg (3 mg x 1.5) every day   Weekly warfarin total:  31.5 mg   Plan last modified:  Roxie CourtneyD (2020)   Next INR check:  2020   Target end date:  Indefinite    Indications    Factor V Leiden (HCC) (Resolved) [D68.51]  Deep vein thrombosis [453.40] (Resolved) [I82.409]  Pulmonary embolism [415.19] (Resolved) [I26.99]             Anticoagulation Episode Summary     INR check location:  Anticoagulation Clinic    Preferred lab:      Send INR reminders to:      Comments:  bello      Anticoagulation Care Providers     Provider Role Specialty Phone number    DERICK Diehl Referring Family Medicine [phone]    Eliseo Eduardo M.D. Responsible Cardiology [phone]    Deacon Novak, PharmD Responsible          Anticoagulation Patient Findings  Patient Findings     Negatives:  Signs/symptoms of thrombosis, Signs/symptoms of bleeding, Laboratory test error suspected, Change in health, Change in alcohol use, Change in activity, Upcoming invasive procedure, Emergency department visit, Upcoming dental procedure, Missed doses, Extra doses, Change in medications, Change in diet/appetite, Hospital admission, Bruising, Other complaints        Spoke with the patient on the phone today, reporting a therapeutic INR of 2.5. Patient reported that this result was from 20. Confirmed the current warfarin dosing regimen and patient compliance. Patient denies any interval changes to diet and/or medications. Patient denies any signs/symptoms of bleeding or clotting.  Patient instructed to continue with the current warfarin dosing regimen, and asked to follow up again in 4 weeks (per pt preference).     Gustavo FaustinD

## 2020-08-17 ENCOUNTER — HOSPITAL ENCOUNTER (OUTPATIENT)
Dept: RADIOLOGY | Facility: MEDICAL CENTER | Age: 60
End: 2020-08-17
Attending: INTERNAL MEDICINE
Payer: MEDICARE

## 2020-08-17 ENCOUNTER — HOSPITAL ENCOUNTER (OUTPATIENT)
Dept: CARDIOLOGY | Facility: MEDICAL CENTER | Age: 60
End: 2020-08-17
Attending: INTERNAL MEDICINE
Payer: MEDICARE

## 2020-08-17 DIAGNOSIS — R06.02 SHORTNESS OF BREATH: ICD-10-CM

## 2020-08-17 DIAGNOSIS — E78.2 MIXED HYPERLIPIDEMIA: ICD-10-CM

## 2020-08-17 DIAGNOSIS — I25.10 CORONARY ARTERIOSCLEROSIS: ICD-10-CM

## 2020-08-17 DIAGNOSIS — R07.89 OTHER CHEST PAIN: ICD-10-CM

## 2020-08-17 PROCEDURE — 700111 HCHG RX REV CODE 636 W/ 250 OVERRIDE (IP)

## 2020-08-17 PROCEDURE — 93018 CV STRESS TEST I&R ONLY: CPT | Performed by: INTERNAL MEDICINE

## 2020-08-17 PROCEDURE — A9502 TC99M TETROFOSMIN: HCPCS | Mod: ME

## 2020-08-17 PROCEDURE — 78452 HT MUSCLE IMAGE SPECT MULT: CPT | Mod: 26 | Performed by: INTERNAL MEDICINE

## 2020-08-17 PROCEDURE — 700117 HCHG RX CONTRAST REV CODE 255: Performed by: INTERNAL MEDICINE

## 2020-08-17 PROCEDURE — 93306 TTE W/DOPPLER COMPLETE: CPT

## 2020-08-17 RX ORDER — REGADENOSON 0.08 MG/ML
0.4 INJECTION, SOLUTION INTRAVENOUS ONCE
Status: COMPLETED | OUTPATIENT
Start: 2020-08-17 | End: 2020-08-17

## 2020-08-17 RX ORDER — REGADENOSON 0.08 MG/ML
INJECTION, SOLUTION INTRAVENOUS
Status: COMPLETED
Start: 2020-08-17 | End: 2020-08-17

## 2020-08-17 RX ADMIN — REGADENOSON 0.4 MG: 0.08 INJECTION, SOLUTION INTRAVENOUS at 11:19

## 2020-08-17 RX ADMIN — HUMAN ALBUMIN MICROSPHERES AND PERFLUTREN 3 ML: 10; .22 INJECTION, SOLUTION INTRAVENOUS at 11:52

## 2020-08-18 ENCOUNTER — TELEPHONE (OUTPATIENT)
Dept: CARDIOLOGY | Facility: MEDICAL CENTER | Age: 60
End: 2020-08-18

## 2020-08-18 LAB
LV EJECT FRACT  99904: 60
LV EJECT FRACT MOD 2C 99903: 54.46
LV EJECT FRACT MOD 4C 99902: 48.67
LV EJECT FRACT MOD BP 99901: 49

## 2020-08-18 PROCEDURE — 93306 TTE W/DOPPLER COMPLETE: CPT | Mod: 26 | Performed by: INTERNAL MEDICINE

## 2020-08-18 NOTE — TELEPHONE ENCOUNTER
----- Message -----   From: Vipin Redd M.D.   Sent: 8/18/2020   8:15 AM PDT   To: Minna Ramirez R.N.     Please call with unremarkable studies myocardial perfusion scan and echocardiogram.   Thanks.  KULDIP       Attempted to call pt, no answer, LM for her to call back.

## 2020-08-18 NOTE — LETTER
August 20, 2020        Phyllis Perez  [address] [place] [address]          Dear Phyllis,      Dr. Redd reviewed your stress test and echocardiogram. Your results were normal. Please keep your follow up visit scheduled for 11/10/20 at 9:20am.    If you have any questions, please don't hesitate to give us a call at [phone].      Thank you,    Donya SAUER for Dr. Redd  Cardiology

## 2020-08-20 RX ORDER — EZETIMIBE 10 MG/1
TABLET ORAL
Qty: 90 TAB | Refills: 3 | Status: SHIPPED | OUTPATIENT
Start: 2020-08-20

## 2020-08-20 NOTE — TELEPHONE ENCOUNTER
Attempted to call pt again this afternoon, no answer, LM for her to call back. Letter drafted and mailed to pt.

## 2020-09-14 ENCOUNTER — ANTICOAGULATION MONITORING (OUTPATIENT)
Dept: MEDICAL GROUP | Facility: PHYSICIAN GROUP | Age: 60
End: 2020-09-14

## 2020-09-14 LAB — INR PPP: 5.8 (ref 2–3.5)

## 2020-09-14 NOTE — PROGRESS NOTES
OP   Telephone Anticoagulation Service Note      Anticoagulation Summary  As of 2020    INR goal:  2.5-3.5   TTR:  42.9 % (5.1 y)   INR used for dosin.80 (2020)   Warfarin maintenance plan:  3 mg (3 mg x 1) every Wed, Sat; 4.5 mg (3 mg x 1.5) all other days   Weekly warfarin total:  28.5 mg   Plan last modified:  Elida Stoner (2020)   Next INR check:  2020   Target end date:  Indefinite    Indications    Factor V Leiden (HCC) (Resolved) [D68.51]  Deep vein thrombosis [453.40] (Resolved) [I82.409]  Pulmonary embolism [415.19] (Resolved) [I26.99]             Anticoagulation Episode Summary     INR check location:  Anticoagulation Clinic    Preferred lab:      Send INR reminders to:      Comments:  bello      Anticoagulation Care Providers     Provider Role Specialty Phone number    DERICK Diehl Referring Family Medicine [phone]    Eliseo Eduardo M.D. Responsible Cardiology [phone]    Deacon Novak, PharmD Responsible          Anticoagulation Patient Findings  Patient Findings     Positives:  Change in medications    Negatives:  Signs/symptoms of thrombosis, Signs/symptoms of bleeding, Laboratory test error suspected, Change in health, Change in alcohol use, Change in activity, Upcoming invasive procedure, Emergency department visit, Upcoming dental procedure, Missed doses, Extra doses, Change in diet/appetite, Hospital admission, Bruising, Other complaints    Comments:  Patient started on trazodone and diet pills (Mega)        Spoke with the patient on the phone today, reporting a SUPRA-therapeutic INR of 5.8. Confirmed the current warfarin dosing regimen and patient compliance. Patient denies any cranberries, EtOH or illness. Patient reports starting on 2 new medications (Trazodone and OTC diet pills- Mega)  Patient denies any interval changes to diet. Patient denies any signs/symptoms of bleeding or clotting.  Patient instructed to HOLD warfarin x 2 days,  then to begin reduced regimen of 3mg on Wed and Sat and 4.5mg ROW. Patient agrees to retest again in 1 week. (per pt preference).    Gustavo FaustinD

## 2020-09-22 NOTE — PROGRESS NOTES
Anticoagulation Summary  As of 9/22/2020    INR goal:  2.5-3.5   TTR:  42.8 % (5.1 y)   INR used for dosing:  3.40 (9/21/2020)   Warfarin maintenance plan:  3 mg (3 mg x 1) every Wed, Sat; 4.5 mg (3 mg x 1.5) all other days   Weekly warfarin total:  28.5 mg   Plan last modified:  Christiane Caal (9/22/2020)   Next INR check:  9/28/2020   Target end date:  Indefinite    Indications    Factor V Leiden (HCC) (Resolved) [D68.51]  Deep vein thrombosis [453.40] (Resolved) [I82.409]  Pulmonary embolism [415.19] (Resolved) [I26.99]             Anticoagulation Episode Summary     INR check location:  Anticoagulation Clinic    Preferred lab:      Send INR reminders to:      Comments:  bello      Anticoagulation Care Providers     Provider Role Specialty Phone number    DERICK Diehl Referring Family Medicine [phone]    Eliseo Eduardo M.D. Responsible Cardiology [phone]    Deacon Novak, PharmD Responsible          Anticoagulation Patient Findings    Left voicemail message to report a  therapeutic INR of 3.4.  Pt to continue with current warfarin dosing regimen. Requested pt contact the clinic for any s/s of unusual bleeding, bruising, clotting or any changes to diet or medication.  Follow up in 1 weeks, to reduce the risk of adverse events related to this high risk medication, warfarin.    Christiane Caal, Clinical Pharmacist

## 2020-10-20 NOTE — PROGRESS NOTES
OP   Telephone Anticoagulation Service Note      Anticoagulation Summary  As of 10/20/2020    INR goal:  2.5-3.5   TTR:  42.2 % (5.2 y)   INR used for dosin.20 (10/20/2020)   Warfarin maintenance plan:  3 mg (3 mg x 1) every Mon, Wed, Fri; 4.5 mg (3 mg x 1.5) all other days   Weekly warfarin total:  27 mg   Plan last modified:  Elida Stoner (10/20/2020)   Next INR check:  11/3/2020   Target end date:  Indefinite    Indications    Factor V Leiden (HCC) (Resolved) [D68.51]  Deep vein thrombosis [453.40] (Resolved) [I82.409]  Pulmonary embolism [415.19] (Resolved) [I26.99]             Anticoagulation Episode Summary     INR check location:  Anticoagulation Clinic    Preferred lab:      Send INR reminders to:      Comments:  bello      Anticoagulation Care Providers     Provider Role Specialty Phone number    MAYE Diehl Referring Family Medicine [phone]    Eliseo Eduardo M.D. Responsible Cardiology [phone]    Deacon Novak, PharmD Responsible          Anticoagulation Patient Findings  Patient Findings     Positives:  Change in diet/appetite         Spoke with the patient on the phone today, reporting a SUPRA-therapeutic INR of 5.2. Confirmed the current warfarin dosing regimen and patient compliance. Patient denies any cranberries, EtOH or illness. Patient reports she may have been eating slightly less greens.  Patient denies any interval changes to medications. Patient denies any signs/symptoms of bleeding or clotting.  Patient will HOLD warfarin x 2 days, and begin slightly reduced regimen of 3mg on Mon, Wed, Fri and 4.5mg ROW. Patient will retest again in 2 weeks (per pt preference).     Gustavo Stoner  PharmD

## 2020-11-04 NOTE — PROGRESS NOTES
Anticoagulation Summary  As of 2020    INR goal:  2.5-3.5   TTR:  41.9 % (5.2 y)   INR used for dosin.60 (2020)   Warfarin maintenance plan:  3 mg (3 mg x 1) every Mon, Wed, Fri; 4.5 mg (3 mg x 1.5) all other days   Weekly warfarin total:  27 mg   Plan last modified:  Romero Gonzalez, PharmD (2020)   Next INR check:  11/10/2020   Target end date:  Indefinite    Indications    Factor V Leiden (HCC) (Resolved) [D68.51]  Deep vein thrombosis [453.40] (Resolved) [I82.409]  Pulmonary embolism [415.19] (Resolved) [I26.99]             Anticoagulation Episode Summary     INR check location:  Anticoagulation Clinic    Preferred lab:      Send INR reminders to:      Comments:  bello      Anticoagulation Care Providers     Provider Role Specialty Phone number    MAYE Diehl Referring Family Medicine [phone]    Eliseo Eduardo M.D. Responsible Cardiology [phone]    Deacon Novak, PharmD Responsible          Anticoagulation Patient Findings  Patient Findings     Positives:  Upcoming invasive procedure    Negatives:  Signs/symptoms of thrombosis, Signs/symptoms of bleeding, Laboratory test error suspected, Change in health, Change in alcohol use, Change in activity, Emergency department visit, Upcoming dental procedure, Missed doses, Extra doses, Change in medications, Change in diet/appetite, Hospital admission, Bruising, Other complaints        Spoke with patient today regarding supratherapeutic INR of 4.6.  Patient denies any signs/symptoms of bruising or bleeding or any changes in diet and medications.  Instructed patient to call clinic with any questions or concerns.  Invasive shoulder procedure scheduled for 20, will need to hold warfarin five days prior to procedure due to hx of Factor V Leiden and multiple VTE.  Schedule below emailed to patient.    11/10:  Check INR, may need to hold warfarin if INR still high.  :  NO warfarin  :  NO warfarin, Lovenox 120mg in  the PM  11/13:  NO warfarin, Lovenox 120mg in the PM  11/14:  NO warfarin, Lovenox 120mg in the PM  11/15:  NO warfarin, NO Lovenox  11/16:  Procedure day, restart warfarin (4.5mg) at operating physician discretion  11/17:  Warfarin 4.5mg, restart Lovenox 120mg in the PM  11/18:  Warfarin 4.5mg, Lovenox 120mg in the PM  11/19:  Warfarin 4.5mg, Lovenox 120mg in the PM    Although INR's have trended high, will only have her hold one dose today, then resume current regimen until next INR.    Follow up in 1 weeks, to reduce risk of adverse events related to this high risk medication,  Warfarin.    Romero Gonzalez, PharmD, BCACP

## 2020-11-10 NOTE — PROGRESS NOTES
Chief Complaint   Patient presents with   • Hyperlipidemia     follow up       Subjective:   Phyllis Perez is a 60 y.o. female who presents today for follow up of coronary artery disease and study result review.    Since the patient's last visit on 08/04/20, she has been doing well clinically. She denies nausea/vomiting or diaphoresis. She suffers with chronic chest pain and shortness of breath due to chronic obstructive pulmonary disease. She underwent unremarkable cardiac studies as described.    Past Medical History:   Diagnosis Date   • Anemia    • Anticoagulation monitoring, special range     coumadin therapy   • CAD (coronary artery disease)     Old MI/POBA   • Clotting disorder (HCC)     Factor V Leiden, IVC filter for hx of DVT, PE   • Emphysema (subcutaneous) (surgical) resulting from a procedure     asthma   • Hot flashes    • Hyperlipemia, mixed    • Old MI (myocardial infarction)     Treated with POBA; Cath without obstructive DZ 2009   • Ovarian cancer (HCC)    • Sleep apnea     CPAP   • SSS (sick sinus syndrome) (HCC)     Tx with pacer   • Uterine cancer (HCC)      Past Surgical History:   Procedure Laterality Date   • CERVICAL FUSION POSTERIOR  4/12/2017    Procedure: CERVICAL FUSION POSTERIOR- WITH INSTRUMENTATION C4-5, C5-6;  Surgeon: Alejandro Rayo M.D.;  Location: SURGERY [facility];  Service:    • PACEMAKER INSERTION  2010    bradycardia   • ANGIOPLASTY      ?2004   • CHOLECYSTECTOMY     • ELBOW EXPLORATION      cts   • HYSTERECTOMY LAPAROSCOPY     • KNEE REPLACEMENT, TOTAL     • KNEE REVISION TOTAL     • LUMBAR EXPLORATION      buck   • NASAL RECONSTRUCTION     • OOPHORECTOMY     • OTHER SURGICAL PROCEDURE      IVC filter placement   • SHOULDER ARTHROPLASTY TOTAL REVERSED     • ZZZ CARDIAC CATH       Family History   Problem Relation Age of Onset   • Cancer Mother    • Cancer Father    • Cancer Maternal Aunt    • Cancer Maternal Uncle    • Heart Disease Neg Hx      Social History      Socioeconomic History   • Marital status:      Spouse name: Not on file   • Number of children: Not on file   • Years of education: Not on file   • Highest education level: Not on file   Occupational History   • Not on file   Social Needs   • Financial resource strain: Not on file   • Food insecurity     Worry: Not on file     Inability: Not on file   • Transportation needs     Medical: Not on file     Non-medical: Not on file   Tobacco Use   • Smoking status: Former Smoker     Years: 15.00     Quit date: 7/10/2001     Years since quittin.3   • Smokeless tobacco: Never Used   Substance and Sexual Activity   • Alcohol use: Yes     Comment: Notes intermittent alcohol use   • Drug use: No   • Sexual activity: Not Currently     Partners: Male   Lifestyle   • Physical activity     Days per week: Not on file     Minutes per session: Not on file   • Stress: Not on file   Relationships   • Social connections     Talks on phone: Not on file     Gets together: Not on file     Attends Scientologist service: Not on file     Active member of club or organization: Not on file     Attends meetings of clubs or organizations: Not on file     Relationship status: Not on file   • Intimate partner violence     Fear of current or ex partner: Not on file     Emotionally abused: Not on file     Physically abused: Not on file     Forced sexual activity: Not on file   Other Topics Concern   • Not on file   Social History Narrative   • Not on file     Allergies   Allergen Reactions   • Sulfamethoxazole-Trimethoprim      Other reaction(s): Diarrhea   • Trimethoprim      Other reaction(s): GI Problems   • Bactrim [Sulfamethoxazole W-Trimethoprim]      constipation   • Morphine      Severe HA   • Other Misc Hives     cloraprep   • Tape      Blistering and then pop     (Medications reviewed.)  Outpatient Encounter Medications as of 11/10/2020   Medication Sig Dispense Refill   • enoxaparin (LOVENOX) 120 MG/0.8ML Solution inj Inject  120 mg as instructed every day. 10 Each 1   • ezetimibe (ZETIA) 10 MG Tab TAKE 1 TABLET BY MOUTH DAILY 90 Tab 3   • warfarin (COUMADIN) 3 MG Tab Take one and one-half tablets daily as directed by [facility] anticoagulation services 135 Tab 1   • atorvastatin (LIPITOR) 20 MG Tab Take  by mouth every day. Take 1 tablet by mouth daily     • amitriptyline (ELAVIL) 25 MG Tab TK 1 TO 4 TS PO QHS     • ipratropium (ATROVENT) 0.03 % Solution INSTILL 2 SPRAYS INTO EACH NOSTRIL BID OR TID     • nitroglycerin (NITROLINGUAL) 0.4 MG/SPRAY Solution Place 1 Spray under tongue as needed (chest pain). 1 Bottle 1   • metoprolol SR (TOPROL XL) 25 MG TABLET SR 24 HR Take 0.5 Tabs by mouth every evening. 45 Tab 3   • potassium chloride ER (KLOR-CON) 10 MEQ tablet Take 1 Tab by mouth every day.  5   • ropinirole (REQUIP) 4 MG tablet Take 4 mg by mouth every evening.     • temazepam (RESTORIL) 30 MG capsule Take 1 Cap by mouth at bedtime as needed for Sleep. Fill at monthly intervals or greater. 30 Cap 0   • fentanyl (DURAGESIC) 100 MCG/HR PATCH 72 HR Apply 1 Patch to skin as directed every 72 hours.  0   • ADVAIR DISKUS 250-50 MCG/DOSE AEROSOL POWDER, BREATH ACTIVATED Inhale 1 Puff by mouth every day.  11   • hydrocodone/acetaminophen (NORCO)  MG Tab Take 1 tablet by mouth every 4 hours as needed for breakthrough pain. The earliest date the pharmacy may fill the rx is 7/18/2016. 180 Tab 0   • carisoprodol (SOMA) 350 MG Tab Take 1 Tab by mouth every 8 hours as needed for Muscle Spasms. Fill at monthly intervals or greater. 90 Tab 0   • aspirin (ASA) 81 MG Chew Tab chewable tablet Take 81 mg by mouth every day.     • albuterol (VENTOLIN OR PROVENTIL) 108 (90 BASE) MCG/ACT AERS inhalation aerosol Inhale 2 Puffs by mouth every 6 hours as needed for Shortness of Breath. 8.5 g 3   • fluticasone (FLONASE) 50 MCG/ACT nasal spray Spray 1 Spray in nose every day. 16 g 11   • Loratadine (CLARITIN) 10 MG CAPS Take 1 Cap by mouth every day. 30 Cap  "11   • ferrous sulfate 325 (65 FE) MG tablet Take 1 Tab by mouth every day. 30 Tab 3     No facility-administered encounter medications on file as of 11/10/2020.      Review of Systems   Constitutional: Positive for malaise/fatigue. Negative for chills, fever and weight loss.   HENT: Positive for congestion and tinnitus. Negative for hearing loss.    Eyes: Positive for blurred vision. Negative for double vision.   Respiratory: Positive for shortness of breath. Negative for cough.    Cardiovascular: Positive for chest pain and palpitations. Negative for claudication and leg swelling.   Gastrointestinal: Positive for diarrhea and nausea. Negative for abdominal pain and vomiting.   Genitourinary: Negative.  Negative for dysuria and urgency.   Musculoskeletal: Positive for back pain, falls, joint pain, myalgias and neck pain.   Skin: Negative.  Negative for itching and rash.   Neurological: Positive for dizziness, loss of consciousness and headaches. Negative for focal weakness and weakness.   Endo/Heme/Allergies: Positive for environmental allergies. Does not bruise/bleed easily.   Psychiatric/Behavioral: Positive for depression. The patient is nervous/anxious.         Objective:   BP (!) 94/82 (BP Location: Left arm, Patient Position: Sitting, BP Cuff Size: Adult)   Pulse (!) 110   Resp 12   Ht 1.727 m (5' 8\")   Wt 88.8 kg (195 lb 11.2 oz)   SpO2 94%   BMI 29.76 kg/m²     Physical Exam   Constitutional: She is oriented to person, place, and time. She appears well-developed and well-nourished.   HENT:   Head: Normocephalic and atraumatic.   Eyes: EOM are normal.   Neck: No JVD present.   Cardiovascular: Normal rate, regular rhythm and normal heart sounds.   Pulmonary/Chest: Effort normal and breath sounds normal.   Abdominal: Soft. Bowel sounds are normal.   No hepatosplenomegaly.   Musculoskeletal: Normal range of motion.   Lymphadenopathy:     She has no cervical adenopathy.   Neurological: She is alert and " oriented to person, place, and time.   Skin: Skin is warm and dry.   Psychiatric: She has a normal mood and affect.     CARDIAC STUDIES/PROCEDURES:     CAROTID ULTRASOUND (11/02/15)  Minimal atherosclerotic plaque involving the right internal carotid artery.   No evidence of a hemodynamically significant stenosis.  No significant atherosclerotic plaque involving the left internal carotid artery.   No evidence of a hemodynamically significant stenosis.   Normal antegrade vertebral artery flow bilaterally.    ECHOCARDIOGRAM CONCLUSIONS (08/17/20)  Prior study done 4/26/18, compared to the report of the study done -   there has been no significant change.   Normal left ventricular systolic function.  Left ventricular ejection fraction is visually estimated to be 60%.  Pacer/ICD wire seen in right ventricle.  No significant valve abnormalities.   Estimated right ventricular systolic pressure is 20 mmHg.  (study result reviewed)     ECHOCARDIOGRAM CONCLUSIONS (04/26/18)  Normal left ventricular chamber size.    Normal left ventricular wall thickness.   Mild mid septal hypokinesis.   Left ventricular ejection fraction is visually estimated to be 55%.     Grade I diastolic dysfunction.  Compared to the images of the prior study done 9/2015 -    there has been no significant change.     EKG performed on (03/10/20) EKG shows sinus rhythm with early R/S transition.     Laboratory results of (03/03/20) Cholesterol profile of 140/126/50/65 noted.    MYOCARDIAL PERFUSION STUDY CONCLUSIONS (08/17/20)  No evidence of significant jeopardized viable myocardium or prior myocardial infarction.  Normal left ventricular size, ejection fraction, and wall motion.  LV ejection fraction = 69%.  ECG INTERPRETATION  Negative stress ECG for ischemia.  (study result reviewed)     PACEMAKER PLACEMENT in [place]   [facility]     PET SCAN (10/21/15)  1.  Normal PET myocardial stress perfusion test for ischemia.  2.  No arrhythmias.  3.   Preserved ejection fraction 74%.     TILT TABLE EVALUATION (09/12/18)  1.  Symptomatic bradycardia and sinus tachycardia with tilt table.  No syncope   appreciated.  2.  Above findings are likely compatible with vasovagal reaction.    Assessment:     1. Coronary arteriosclerosis     2. Mixed hyperlipidemia       Medical Decision Making:  Today's Assessment / Status / Plan:     1. Coronary artery disease (Remote diagnosis in [place]): She remains clinically doing well. I will continue with current medical care including metoprolol and atorvastatin. We will discontinue aspirin.  2. Hyperlipidemia: She is doing well on statin therapy without myalgia symptoms and ezetimibe. (Managed by primary care physician)  3. Pacemaker placement: The pacemake is functioning well.  We will continue with device interrogation.  4. Factor V Leiden with IVC filter and chronic anticoagulation therapy (warfarin).  5. Chronic obstructive pulmonary disease on chronic oxygen therapy (3 L/min)     We will follow up after her tests to reassess his symptoms.     CC Polina Rowell

## 2020-11-11 NOTE — PROGRESS NOTES
Anticoagulation Summary  As of 11/11/2020    INR goal:  2.5-3.5   TTR:  41.8 % (5.2 y)   INR used for dosing:  3.80 (11/10/2020)   Warfarin maintenance plan:  3 mg (3 mg x 1) every Mon, Wed, Fri; 4.5 mg (3 mg x 1.5) all other days   Weekly warfarin total:  27 mg   Plan last modified:  Romero Gonzalez, PharmD (11/4/2020)   Next INR check:  11/19/2020   Target end date:  Indefinite    Indications    Factor V Leiden (HCC) (Resolved) [D68.51]  Deep vein thrombosis [453.40] (Resolved) [I82.409]  Pulmonary embolism [415.19] (Resolved) [I26.99]             Anticoagulation Episode Summary     INR check location:  Anticoagulation Clinic    Preferred lab:      Send INR reminders to:      Comments:  bello      Anticoagulation Care Providers     Provider Role Specialty Phone number    MAYE Diehl Referring Family Medicine [phone]    Eliseo Eduardo M.D. Responsible Cardiology [phone]    Deacon Novak, PharmD Responsible          Anticoagulation Patient Findings      Spoke with patient to report a supratherapeutic INR. Pt has surgery coming on 11/16. Pt was instructed to hold warfarin today through 11/15 then resume at 4.5 mg on 11/16 after surgery.  Pt instructed to start lovenox from 11/12 thru 11/14, hold day before and day of surgery and resume day after surgery. Will f/u on 11/19    Philip Subramanian, Pharmacy Intern

## 2020-11-30 NOTE — PROGRESS NOTES
Anticoagulation Summary  As of 11/30/2020    INR goal:  2.5-3.5   TTR:  41.3 % (5.3 y)   INR used for dosing:  3.80 (11/30/2020)   Warfarin maintenance plan:  4.5 mg (3 mg x 1.5) every Mon, Wed, Fri; 3 mg (3 mg x 1) all other days   Weekly warfarin total:  25.5 mg   Plan last modified:  Inocencia Still PharmD (11/30/2020)   Next INR check:  12/14/2020   Target end date:  Indefinite    Indications    Factor V Leiden (HCC) (Resolved) [D68.51]  Deep vein thrombosis [453.40] (Resolved) [I82.409]  Pulmonary embolism [415.19] (Resolved) [I26.99]             Anticoagulation Episode Summary     INR check location:  Anticoagulation Clinic    Preferred lab:      Send INR reminders to:      Comments:  bello      Anticoagulation Care Providers     Provider Role Specialty Phone number    MAYE Diehl Referring Family Medicine [phone]    Eliseo Eduardo M.D. Responsible Cardiology [phone]    Deacon Novak, PharmD Responsible          Anticoagulation Patient Findings      Spoke with pt.  INR is supra therapeutic.   Pt denies any unusual s/s of bleeding, bruising, clotting or any changes to diet or medications. Denies any etoh, cranberries, supplements, or illness.   Pt verifies warfarin weekly dosing.   Pt reports she did check her INR on 11/19, but was unable to leave a message with our clinic. Verified that she called [phone]. Pt said she tried reporting her INR to [employer], but per the [employer] report we received today, there was no INR from 11/19 documented. Apologized for the miscommunication.    Will have pt reduce weekly regimen.    Repeat INR in 2 week(s).     Inocencia Still, PharmD

## 2021-01-01 ENCOUNTER — NON-PROVIDER VISIT (OUTPATIENT)
Dept: CARDIOLOGY | Facility: MEDICAL CENTER | Age: 61
End: 2021-01-01
Payer: MEDICARE

## 2021-01-01 ENCOUNTER — TELEPHONE (OUTPATIENT)
Dept: VASCULAR LAB | Facility: MEDICAL CENTER | Age: 61
End: 2021-01-01

## 2021-01-01 ENCOUNTER — DOCUMENTATION (OUTPATIENT)
Dept: VASCULAR LAB | Facility: MEDICAL CENTER | Age: 61
End: 2021-01-01

## 2021-01-01 ENCOUNTER — ANTICOAGULATION MONITORING (OUTPATIENT)
Dept: VASCULAR LAB | Facility: MEDICAL CENTER | Age: 61
End: 2021-01-01

## 2021-01-01 ENCOUNTER — PRE-ADMISSION TESTING (OUTPATIENT)
Dept: ADMISSIONS | Facility: MEDICAL CENTER | Age: 61
End: 2021-01-01
Attending: ORTHOPAEDIC SURGERY
Payer: MEDICARE

## 2021-01-01 ENCOUNTER — HOSPITAL ENCOUNTER (OUTPATIENT)
Facility: MEDICAL CENTER | Age: 61
End: 2021-09-16
Attending: ORTHOPAEDIC SURGERY | Admitting: ORTHOPAEDIC SURGERY
Payer: MEDICARE

## 2021-01-01 ENCOUNTER — ANESTHESIA EVENT (OUTPATIENT)
Dept: SURGERY | Facility: MEDICAL CENTER | Age: 61
End: 2021-01-01
Payer: MEDICARE

## 2021-01-01 ENCOUNTER — TELEPHONE (OUTPATIENT)
Dept: CARDIOLOGY | Facility: MEDICAL CENTER | Age: 61
End: 2021-01-01

## 2021-01-01 ENCOUNTER — ANTICOAGULATION MONITORING (OUTPATIENT)
Dept: MEDICAL GROUP | Facility: PHYSICIAN GROUP | Age: 61
End: 2021-01-01

## 2021-01-01 ENCOUNTER — ANESTHESIA (OUTPATIENT)
Dept: SURGERY | Facility: MEDICAL CENTER | Age: 61
End: 2021-01-01
Payer: MEDICARE

## 2021-01-01 ENCOUNTER — OFFICE VISIT (OUTPATIENT)
Dept: CARDIOLOGY | Facility: MEDICAL CENTER | Age: 61
End: 2021-01-01
Payer: MEDICARE

## 2021-01-01 ENCOUNTER — APPOINTMENT (OUTPATIENT)
Dept: VASCULAR LAB | Facility: MEDICAL CENTER | Age: 61
End: 2021-01-01
Payer: MEDICARE

## 2021-01-01 VITALS — WEIGHT: 200 LBS | BODY MASS INDEX: 30.41 KG/M2

## 2021-01-01 VITALS
DIASTOLIC BLOOD PRESSURE: 68 MMHG | OXYGEN SATURATION: 97 % | RESPIRATION RATE: 14 BRPM | HEIGHT: 68 IN | WEIGHT: 190 LBS | HEART RATE: 103 BPM | BODY MASS INDEX: 28.79 KG/M2 | SYSTOLIC BLOOD PRESSURE: 102 MMHG

## 2021-01-01 VITALS
HEART RATE: 85 BPM | DIASTOLIC BLOOD PRESSURE: 69 MMHG | BODY MASS INDEX: 32.48 KG/M2 | TEMPERATURE: 96.8 F | RESPIRATION RATE: 16 BRPM | WEIGHT: 214.29 LBS | OXYGEN SATURATION: 92 % | HEIGHT: 68 IN | SYSTOLIC BLOOD PRESSURE: 98 MMHG

## 2021-01-01 DIAGNOSIS — Z01.810 PRE-OPERATIVE CARDIOVASCULAR EXAMINATION: ICD-10-CM

## 2021-01-01 DIAGNOSIS — I25.10 CORONARY ARTERIOSCLEROSIS: ICD-10-CM

## 2021-01-01 DIAGNOSIS — Z95.0 CARDIAC PACEMAKER IN SITU: ICD-10-CM

## 2021-01-01 DIAGNOSIS — Z79.01 CHRONIC ANTICOAGULATION: Primary | ICD-10-CM

## 2021-01-01 DIAGNOSIS — I49.5 SICK SINUS SYNDROME (HCC): ICD-10-CM

## 2021-01-01 DIAGNOSIS — D68.2 FACTOR V DEFICIENCY (HCC): ICD-10-CM

## 2021-01-01 DIAGNOSIS — M75.41 ROTATOR CUFF IMPINGEMENT SYNDROME OF RIGHT SHOULDER: ICD-10-CM

## 2021-01-01 DIAGNOSIS — D68.2 FACTOR V DEFICIENCY (HCC): Primary | ICD-10-CM

## 2021-01-01 DIAGNOSIS — Z01.812 PRE-OPERATIVE LABORATORY EXAMINATION: ICD-10-CM

## 2021-01-01 DIAGNOSIS — E78.2 MIXED HYPERLIPIDEMIA: ICD-10-CM

## 2021-01-01 DIAGNOSIS — Z01.810 PREOPERATIVE CARDIOVASCULAR EXAMINATION: ICD-10-CM

## 2021-01-01 DIAGNOSIS — Z79.01 CHRONIC ANTICOAGULATION: ICD-10-CM

## 2021-01-01 DIAGNOSIS — G89.18 POST-OPERATIVE PAIN: ICD-10-CM

## 2021-01-01 LAB
ANION GAP SERPL CALC-SCNC: 12 MMOL/L (ref 7–16)
ANION GAP SERPL CALC-SCNC: 13 MMOL/L (ref 7–16)
BASOPHILS # BLD AUTO: 0.4 % (ref 0–1.8)
BASOPHILS # BLD: 0.04 K/UL (ref 0–0.12)
BUN SERPL-MCNC: 7 MG/DL (ref 8–22)
BUN SERPL-MCNC: 9 MG/DL (ref 8–22)
CALCIUM SERPL-MCNC: 9.1 MG/DL (ref 8.4–10.2)
CALCIUM SERPL-MCNC: 9.3 MG/DL (ref 8.4–10.2)
CHLORIDE SERPL-SCNC: 101 MMOL/L (ref 96–112)
CHLORIDE SERPL-SCNC: 104 MMOL/L (ref 96–112)
CO2 SERPL-SCNC: 23 MMOL/L (ref 20–33)
CO2 SERPL-SCNC: 24 MMOL/L (ref 20–33)
CREAT SERPL-MCNC: 0.77 MG/DL (ref 0.5–1.4)
CREAT SERPL-MCNC: 0.79 MG/DL (ref 0.5–1.4)
EKG IMPRESSION: NORMAL
EOSINOPHIL # BLD AUTO: 0.21 K/UL (ref 0–0.51)
EOSINOPHIL NFR BLD: 2.2 % (ref 0–6.9)
ERYTHROCYTE [DISTWIDTH] IN BLOOD BY AUTOMATED COUNT: 46.6 FL (ref 35.9–50)
ERYTHROCYTE [DISTWIDTH] IN BLOOD BY AUTOMATED COUNT: 47.8 FL (ref 35.9–50)
GLUCOSE SERPL-MCNC: 103 MG/DL (ref 65–99)
GLUCOSE SERPL-MCNC: 87 MG/DL (ref 65–99)
HCT VFR BLD AUTO: 46.1 % (ref 37–47)
HCT VFR BLD AUTO: 47.4 % (ref 37–47)
HGB BLD-MCNC: 14.6 G/DL (ref 12–16)
HGB BLD-MCNC: 14.7 G/DL (ref 12–16)
IMM GRANULOCYTES # BLD AUTO: 0.04 K/UL (ref 0–0.11)
IMM GRANULOCYTES NFR BLD AUTO: 0.4 % (ref 0–0.9)
INR PPP: 1.09 (ref 0.87–1.13)
INR PPP: 2.1 (ref 2–3.5)
INR PPP: 2.7 (ref 2–3.5)
INR PPP: 3.1 (ref 2–3.5)
INR PPP: 3.1 (ref 2–3.5)
INR PPP: 3.8 (ref 2–3.5)
INR PPP: 3.8 (ref 2–3.5)
INR PPP: 3.9 (ref 2–3.5)
INR PPP: 4.8 (ref 2–3.5)
LYMPHOCYTES # BLD AUTO: 2.44 K/UL (ref 1–4.8)
LYMPHOCYTES NFR BLD: 26.1 % (ref 22–41)
MCH RBC QN AUTO: 28.6 PG (ref 27–33)
MCH RBC QN AUTO: 29.3 PG (ref 27–33)
MCHC RBC AUTO-ENTMCNC: 31 G/DL (ref 33.6–35)
MCHC RBC AUTO-ENTMCNC: 31.7 G/DL (ref 33.6–35)
MCV RBC AUTO: 92.2 FL (ref 81.4–97.8)
MCV RBC AUTO: 92.4 FL (ref 81.4–97.8)
MONOCYTES # BLD AUTO: 0.48 K/UL (ref 0–0.85)
MONOCYTES NFR BLD AUTO: 5.1 % (ref 0–13.4)
NEUTROPHILS # BLD AUTO: 6.13 K/UL (ref 2–7.15)
NEUTROPHILS NFR BLD: 65.8 % (ref 44–72)
NRBC # BLD AUTO: 0 K/UL
NRBC BLD-RTO: 0 /100 WBC
PLATELET # BLD AUTO: 281 K/UL (ref 164–446)
PLATELET # BLD AUTO: 336 K/UL (ref 164–446)
PMV BLD AUTO: 10 FL (ref 9–12.9)
PMV BLD AUTO: 9.9 FL (ref 9–12.9)
POTASSIUM SERPL-SCNC: 4.6 MMOL/L (ref 3.6–5.5)
POTASSIUM SERPL-SCNC: 4.7 MMOL/L (ref 3.6–5.5)
PROTHROMBIN TIME: 13.3 SEC (ref 12–14.6)
RBC # BLD AUTO: 4.99 M/UL (ref 4.2–5.4)
RBC # BLD AUTO: 5.14 M/UL (ref 4.2–5.4)
SARS-COV-2 RNA RESP QL NAA+PROBE: NOTDETECTED
SARS-COV-2 RNA RESP QL NAA+PROBE: NOTDETECTED
SODIUM SERPL-SCNC: 137 MMOL/L (ref 135–145)
SODIUM SERPL-SCNC: 140 MMOL/L (ref 135–145)
SPECIMEN SOURCE: NORMAL
SPECIMEN SOURCE: NORMAL
WBC # BLD AUTO: 7.5 K/UL (ref 4.8–10.8)
WBC # BLD AUTO: 9.3 K/UL (ref 4.8–10.8)

## 2021-01-01 PROCEDURE — A9270 NON-COVERED ITEM OR SERVICE: HCPCS | Performed by: ANESTHESIOLOGY

## 2021-01-01 PROCEDURE — A9270 NON-COVERED ITEM OR SERVICE: HCPCS | Performed by: ORTHOPAEDIC SURGERY

## 2021-01-01 PROCEDURE — 64418 NJX AA&/STRD SPRSCAP NRV: CPT | Performed by: ORTHOPAEDIC SURGERY

## 2021-01-01 PROCEDURE — 700111 HCHG RX REV CODE 636 W/ 250 OVERRIDE (IP): Performed by: ANESTHESIOLOGY

## 2021-01-01 PROCEDURE — U0003 INFECTIOUS AGENT DETECTION BY NUCLEIC ACID (DNA OR RNA); SEVERE ACUTE RESPIRATORY SYNDROME CORONAVIRUS 2 (SARS-COV-2) (CORONAVIRUS DISEASE [COVID-19]), AMPLIFIED PROBE TECHNIQUE, MAKING USE OF HIGH THROUGHPUT TECHNOLOGIES AS DESCRIBED BY CMS-2020-01-R: HCPCS

## 2021-01-01 PROCEDURE — 160009 HCHG ANES TIME/MIN: Performed by: ORTHOPAEDIC SURGERY

## 2021-01-01 PROCEDURE — 502581 HCHG PACK, SHOULDER ARTHROSCOPY: Performed by: ORTHOPAEDIC SURGERY

## 2021-01-01 PROCEDURE — 93280 PM DEVICE PROGR EVAL DUAL: CPT | Performed by: INTERNAL MEDICINE

## 2021-01-01 PROCEDURE — 85025 COMPLETE CBC W/AUTO DIFF WBC: CPT

## 2021-01-01 PROCEDURE — 160002 HCHG RECOVERY MINUTES (STAT): Performed by: ORTHOPAEDIC SURGERY

## 2021-01-01 PROCEDURE — 99214 OFFICE O/P EST MOD 30 MIN: CPT | Performed by: INTERNAL MEDICINE

## 2021-01-01 PROCEDURE — 93005 ELECTROCARDIOGRAM TRACING: CPT

## 2021-01-01 PROCEDURE — 500144 HCHG CANNULA KIT, UNIV GREY-SHOULDER: Performed by: ORTHOPAEDIC SURGERY

## 2021-01-01 PROCEDURE — 160041 HCHG SURGERY MINUTES - EA ADDL 1 MIN LEVEL 4: Performed by: ORTHOPAEDIC SURGERY

## 2021-01-01 PROCEDURE — 501838 HCHG SUTURE GENERAL: Performed by: ORTHOPAEDIC SURGERY

## 2021-01-01 PROCEDURE — 36415 COLL VENOUS BLD VENIPUNCTURE: CPT

## 2021-01-01 PROCEDURE — 85027 COMPLETE CBC AUTOMATED: CPT

## 2021-01-01 PROCEDURE — 500028 HCHG ARTHROWAND TURBOVAC 3.5/90 SUCT.: Performed by: ORTHOPAEDIC SURGERY

## 2021-01-01 PROCEDURE — 700101 HCHG RX REV CODE 250: Performed by: ORTHOPAEDIC SURGERY

## 2021-01-01 PROCEDURE — 29822 SHO ARTHRS SRG LMTD DBRDMT: CPT | Mod: 59 | Performed by: ORTHOPAEDIC SURGERY

## 2021-01-01 PROCEDURE — 29826 SHO ARTHRS SRG DECOMPRESSION: CPT | Performed by: ORTHOPAEDIC SURGERY

## 2021-01-01 PROCEDURE — 160046 HCHG PACU - 1ST 60 MINS PHASE II: Performed by: ORTHOPAEDIC SURGERY

## 2021-01-01 PROCEDURE — 700101 HCHG RX REV CODE 250: Performed by: ANESTHESIOLOGY

## 2021-01-01 PROCEDURE — 160029 HCHG SURGERY MINUTES - 1ST 30 MINS LEVEL 4: Performed by: ORTHOPAEDIC SURGERY

## 2021-01-01 PROCEDURE — 80048 BASIC METABOLIC PNL TOTAL CA: CPT

## 2021-01-01 PROCEDURE — 160048 HCHG OR STATISTICAL LEVEL 1-5: Performed by: ORTHOPAEDIC SURGERY

## 2021-01-01 PROCEDURE — 160036 HCHG PACU - EA ADDL 30 MINS PHASE I: Performed by: ORTHOPAEDIC SURGERY

## 2021-01-01 PROCEDURE — 500151 HCHG CANNULA, THRDED 8.4: Performed by: ORTHOPAEDIC SURGERY

## 2021-01-01 PROCEDURE — U0005 INFEC AGEN DETEC AMPLI PROBE: HCPCS

## 2021-01-01 PROCEDURE — 160025 RECOVERY II MINUTES (STATS): Performed by: ORTHOPAEDIC SURGERY

## 2021-01-01 PROCEDURE — 700105 HCHG RX REV CODE 258: Performed by: ORTHOPAEDIC SURGERY

## 2021-01-01 PROCEDURE — 93010 ELECTROCARDIOGRAM REPORT: CPT | Performed by: INTERNAL MEDICINE

## 2021-01-01 PROCEDURE — 29826 SHO ARTHRS SRG DECOMPRESSION: CPT | Mod: ASROC | Performed by: SPECIALIST/TECHNOLOGIST, OTHER

## 2021-01-01 PROCEDURE — 700105 HCHG RX REV CODE 258: Performed by: ANESTHESIOLOGY

## 2021-01-01 PROCEDURE — C9803 HOPD COVID-19 SPEC COLLECT: HCPCS

## 2021-01-01 PROCEDURE — 700102 HCHG RX REV CODE 250 W/ 637 OVERRIDE(OP): Performed by: ANESTHESIOLOGY

## 2021-01-01 PROCEDURE — 29827 SHO ARTHRS SRG RT8TR CUF RPR: CPT | Mod: RT | Performed by: ORTHOPAEDIC SURGERY

## 2021-01-01 PROCEDURE — 160035 HCHG PACU - 1ST 60 MINS PHASE I: Performed by: ORTHOPAEDIC SURGERY

## 2021-01-01 PROCEDURE — C1713 ANCHOR/SCREW BN/BN,TIS/BN: HCPCS | Performed by: ORTHOPAEDIC SURGERY

## 2021-01-01 PROCEDURE — 700102 HCHG RX REV CODE 250 W/ 637 OVERRIDE(OP): Performed by: ORTHOPAEDIC SURGERY

## 2021-01-01 PROCEDURE — 85610 PROTHROMBIN TIME: CPT

## 2021-01-01 DEVICE — ANCHOR SUTURE OMEGA PEEK OD4.75 MM 2 KNOTLESS STERILE LATEX FREE: Type: IMPLANTABLE DEVICE | Site: SHOULDER | Status: FUNCTIONAL

## 2021-01-01 RX ORDER — HYDROMORPHONE HYDROCHLORIDE 1 MG/ML
0.1 INJECTION, SOLUTION INTRAMUSCULAR; INTRAVENOUS; SUBCUTANEOUS
Status: DISCONTINUED | OUTPATIENT
Start: 2021-01-01 | End: 2021-01-01 | Stop reason: HOSPADM

## 2021-01-01 RX ORDER — ROPIVACAINE HYDROCHLORIDE 5 MG/ML
INJECTION, SOLUTION EPIDURAL; INFILTRATION; PERINEURAL PRN
Status: DISCONTINUED | OUTPATIENT
Start: 2021-01-01 | End: 2021-01-01 | Stop reason: SURG

## 2021-01-01 RX ORDER — BUPIVACAINE HYDROCHLORIDE AND EPINEPHRINE 5; 5 MG/ML; UG/ML
INJECTION, SOLUTION EPIDURAL; INTRACAUDAL; PERINEURAL
Status: DISCONTINUED | OUTPATIENT
Start: 2021-01-01 | End: 2021-01-01 | Stop reason: HOSPADM

## 2021-01-01 RX ORDER — ONDANSETRON 2 MG/ML
4 INJECTION INTRAMUSCULAR; INTRAVENOUS
Status: DISCONTINUED | OUTPATIENT
Start: 2021-01-01 | End: 2021-01-01 | Stop reason: HOSPADM

## 2021-01-01 RX ORDER — CEFAZOLIN SODIUM 1 G/3ML
INJECTION, POWDER, FOR SOLUTION INTRAMUSCULAR; INTRAVENOUS PRN
Status: DISCONTINUED | OUTPATIENT
Start: 2021-01-01 | End: 2021-01-01 | Stop reason: SURG

## 2021-01-01 RX ORDER — HALOPERIDOL 5 MG/ML
1 INJECTION INTRAMUSCULAR
Status: DISCONTINUED | OUTPATIENT
Start: 2021-01-01 | End: 2021-01-01 | Stop reason: HOSPADM

## 2021-01-01 RX ORDER — DEXAMETHASONE SODIUM PHOSPHATE 4 MG/ML
INJECTION, SOLUTION INTRA-ARTICULAR; INTRALESIONAL; INTRAMUSCULAR; INTRAVENOUS; SOFT TISSUE PRN
Status: DISCONTINUED | OUTPATIENT
Start: 2021-01-01 | End: 2021-01-01 | Stop reason: SURG

## 2021-01-01 RX ORDER — ACETAMINOPHEN 500 MG
1000 TABLET ORAL ONCE
Status: COMPLETED | OUTPATIENT
Start: 2021-01-01 | End: 2021-01-01

## 2021-01-01 RX ORDER — SCOLOPAMINE TRANSDERMAL SYSTEM 1 MG/1
1 PATCH, EXTENDED RELEASE TRANSDERMAL
Status: DISCONTINUED | OUTPATIENT
Start: 2021-01-01 | End: 2021-01-01 | Stop reason: HOSPADM

## 2021-01-01 RX ORDER — WARFARIN SODIUM 3 MG/1
TABLET ORAL
Qty: 135 TABLET | Refills: 1 | Status: SHIPPED | OUTPATIENT
Start: 2021-01-01

## 2021-01-01 RX ORDER — IPRATROPIUM BROMIDE AND ALBUTEROL SULFATE 2.5; .5 MG/3ML; MG/3ML
3 SOLUTION RESPIRATORY (INHALATION)
Status: DISCONTINUED | OUTPATIENT
Start: 2021-01-01 | End: 2021-01-01 | Stop reason: HOSPADM

## 2021-01-01 RX ORDER — ONDANSETRON 2 MG/ML
INJECTION INTRAMUSCULAR; INTRAVENOUS PRN
Status: DISCONTINUED | OUTPATIENT
Start: 2021-01-01 | End: 2021-01-01 | Stop reason: SURG

## 2021-01-01 RX ORDER — HYDROMORPHONE HYDROCHLORIDE 1 MG/ML
0.4 INJECTION, SOLUTION INTRAMUSCULAR; INTRAVENOUS; SUBCUTANEOUS
Status: DISCONTINUED | OUTPATIENT
Start: 2021-01-01 | End: 2021-01-01 | Stop reason: HOSPADM

## 2021-01-01 RX ORDER — HYDROMORPHONE HYDROCHLORIDE 2 MG/ML
INJECTION, SOLUTION INTRAMUSCULAR; INTRAVENOUS; SUBCUTANEOUS PRN
Status: DISCONTINUED | OUTPATIENT
Start: 2021-01-01 | End: 2021-01-01 | Stop reason: SURG

## 2021-01-01 RX ORDER — CEFAZOLIN SODIUM IN 0.9 % NACL 2 G/100 ML
2 PLASTIC BAG, INJECTION (ML) INTRAVENOUS ONCE
Status: DISCONTINUED | OUTPATIENT
Start: 2021-01-01 | End: 2021-01-01 | Stop reason: HOSPADM

## 2021-01-01 RX ORDER — GABAPENTIN 100 MG/1
CAPSULE ORAL
COMMUNITY
Start: 2021-01-01

## 2021-01-01 RX ORDER — SODIUM CHLORIDE, SODIUM LACTATE, POTASSIUM CHLORIDE, CALCIUM CHLORIDE 600; 310; 30; 20 MG/100ML; MG/100ML; MG/100ML; MG/100ML
INJECTION, SOLUTION INTRAVENOUS CONTINUOUS
Status: ACTIVE | OUTPATIENT
Start: 2021-01-01 | End: 2021-01-01

## 2021-01-01 RX ORDER — WARFARIN SODIUM 3 MG/1
TABLET ORAL
Qty: 135 TAB | Refills: 1 | Status: SHIPPED | OUTPATIENT
Start: 2021-01-01 | End: 2021-01-01

## 2021-01-01 RX ORDER — OXYCODONE HYDROCHLORIDE AND ACETAMINOPHEN 5; 325 MG/1; MG/1
1 TABLET ORAL EVERY 4 HOURS PRN
Qty: 42 TABLET | Refills: 0 | Status: SHIPPED | OUTPATIENT
Start: 2021-01-01 | End: 2021-09-23

## 2021-01-01 RX ORDER — OXYCODONE HCL 10 MG/1
10 TABLET, FILM COATED, EXTENDED RELEASE ORAL ONCE
Status: COMPLETED | OUTPATIENT
Start: 2021-01-01 | End: 2021-01-01

## 2021-01-01 RX ORDER — MIDAZOLAM HYDROCHLORIDE 1 MG/ML
INJECTION INTRAMUSCULAR; INTRAVENOUS PRN
Status: DISCONTINUED | OUTPATIENT
Start: 2021-01-01 | End: 2021-01-01 | Stop reason: SURG

## 2021-01-01 RX ORDER — OXYCODONE HCL 5 MG/5 ML
5 SOLUTION, ORAL ORAL
Status: COMPLETED | OUTPATIENT
Start: 2021-01-01 | End: 2021-01-01

## 2021-01-01 RX ORDER — PHENYLEPHRINE HYDROCHLORIDE 10 MG/ML
INJECTION, SOLUTION INTRAMUSCULAR; INTRAVENOUS; SUBCUTANEOUS PRN
Status: DISCONTINUED | OUTPATIENT
Start: 2021-01-01 | End: 2021-01-01 | Stop reason: SURG

## 2021-01-01 RX ORDER — CARISOPRODOL 350 MG/1
350 TABLET ORAL
COMMUNITY

## 2021-01-01 RX ORDER — DEXMEDETOMIDINE HYDROCHLORIDE 100 UG/ML
INJECTION, SOLUTION INTRAVENOUS PRN
Status: DISCONTINUED | OUTPATIENT
Start: 2021-01-01 | End: 2021-01-01 | Stop reason: SURG

## 2021-01-01 RX ORDER — HYDROMORPHONE HYDROCHLORIDE 1 MG/ML
0.2 INJECTION, SOLUTION INTRAMUSCULAR; INTRAVENOUS; SUBCUTANEOUS
Status: DISCONTINUED | OUTPATIENT
Start: 2021-01-01 | End: 2021-01-01 | Stop reason: HOSPADM

## 2021-01-01 RX ORDER — OXYCODONE HCL 5 MG/5 ML
10 SOLUTION, ORAL ORAL
Status: COMPLETED | OUTPATIENT
Start: 2021-01-01 | End: 2021-01-01

## 2021-01-01 RX ORDER — KETOROLAC TROMETHAMINE 30 MG/ML
INJECTION, SOLUTION INTRAMUSCULAR; INTRAVENOUS PRN
Status: DISCONTINUED | OUTPATIENT
Start: 2021-01-01 | End: 2021-01-01 | Stop reason: SURG

## 2021-01-01 RX ORDER — AZELASTINE 1 MG/ML
2 SPRAY, METERED NASAL 2 TIMES DAILY
COMMUNITY
Start: 2021-01-01

## 2021-01-01 RX ORDER — BACITRACIN ZINC 500 [USP'U]/G
OINTMENT TOPICAL
Status: DISCONTINUED | OUTPATIENT
Start: 2021-01-01 | End: 2021-01-01 | Stop reason: HOSPADM

## 2021-01-01 RX ADMIN — ONDANSETRON 4 MG: 2 INJECTION INTRAMUSCULAR; INTRAVENOUS at 07:59

## 2021-01-01 RX ADMIN — SCOPOLAMINE 1 PATCH: 1.5 PATCH, EXTENDED RELEASE TRANSDERMAL at 06:44

## 2021-01-01 RX ADMIN — EPHEDRINE SULFATE 5 MG: 50 INJECTION INTRAVENOUS at 08:54

## 2021-01-01 RX ADMIN — DEXMEDETOMIDINE 30 MCG: 200 INJECTION, SOLUTION INTRAVENOUS at 06:49

## 2021-01-01 RX ADMIN — OXYCODONE HYDROCHLORIDE 10 MG: 5 SOLUTION ORAL at 09:22

## 2021-01-01 RX ADMIN — SODIUM CHLORIDE, POTASSIUM CHLORIDE, SODIUM LACTATE AND CALCIUM CHLORIDE: 600; 310; 30; 20 INJECTION, SOLUTION INTRAVENOUS at 06:45

## 2021-01-01 RX ADMIN — KETOROLAC TROMETHAMINE 15 MG: 30 INJECTION, SOLUTION INTRAMUSCULAR at 07:59

## 2021-01-01 RX ADMIN — HYDROMORPHONE HYDROCHLORIDE 2 MG: 2 INJECTION, SOLUTION INTRAMUSCULAR; INTRAVENOUS; SUBCUTANEOUS at 07:05

## 2021-01-01 RX ADMIN — EPHEDRINE SULFATE 5 MG: 50 INJECTION INTRAVENOUS at 09:11

## 2021-01-01 RX ADMIN — PHENYLEPHRINE HYDROCHLORIDE 200 MCG: 10 INJECTION INTRAVENOUS at 07:17

## 2021-01-01 RX ADMIN — CEFAZOLIN 2 G: 330 INJECTION, POWDER, FOR SOLUTION INTRAMUSCULAR; INTRAVENOUS at 07:09

## 2021-01-01 RX ADMIN — EPHEDRINE SULFATE 50 MG: 50 INJECTION INTRAMUSCULAR; INTRAVENOUS; SUBCUTANEOUS at 08:05

## 2021-01-01 RX ADMIN — PHENYLEPHRINE HYDROCHLORIDE 200 MCG: 10 INJECTION INTRAVENOUS at 07:09

## 2021-01-01 RX ADMIN — ROPIVACAINE HYDROCHLORIDE 10 ML: 5 INJECTION, SOLUTION EPIDURAL; INFILTRATION; PERINEURAL at 06:50

## 2021-01-01 RX ADMIN — MIDAZOLAM HYDROCHLORIDE 3 MG: 1 INJECTION, SOLUTION INTRAMUSCULAR; INTRAVENOUS at 06:40

## 2021-01-01 RX ADMIN — DEXMEDETOMIDINE 30 MCG: 200 INJECTION, SOLUTION INTRAVENOUS at 06:50

## 2021-01-01 RX ADMIN — ROPIVACAINE HYDROCHLORIDE 10 ML: 5 INJECTION, SOLUTION EPIDURAL; INFILTRATION; PERINEURAL at 06:49

## 2021-01-01 RX ADMIN — DEXAMETHASONE SODIUM PHOSPHATE 4 MG: 4 INJECTION, SOLUTION INTRAMUSCULAR; INTRAVENOUS at 07:11

## 2021-01-01 RX ADMIN — PHENYLEPHRINE HYDROCHLORIDE 100 MCG/MIN: 10 INJECTION INTRAVENOUS at 07:17

## 2021-01-01 RX ADMIN — PROPOFOL 150 MG: 10 INJECTION, EMULSION INTRAVENOUS at 07:09

## 2021-01-01 RX ADMIN — ACETAMINOPHEN 1000 MG: 500 TABLET, FILM COATED ORAL at 06:44

## 2021-01-01 RX ADMIN — OXYCODONE HYDROCHLORIDE 10 MG: 10 TABLET, FILM COATED, EXTENDED RELEASE ORAL at 06:44

## 2021-01-01 RX ADMIN — MIDAZOLAM HYDROCHLORIDE 2 MG: 1 INJECTION, SOLUTION INTRAMUSCULAR; INTRAVENOUS at 06:45

## 2021-01-01 ASSESSMENT — ENCOUNTER SYMPTOMS
CHILLS: 0
GASTROINTESTINAL NEGATIVE: 1
NAUSEA: 0
RESPIRATORY NEGATIVE: 1
FEVER: 0
BLURRED VISION: 0
DEPRESSION: 0
CARDIOVASCULAR NEGATIVE: 1
WEIGHT LOSS: 0
SHORTNESS OF BREATH: 0
HEADACHES: 0
CLAUDICATION: 0
ABDOMINAL PAIN: 0
CONSTITUTIONAL NEGATIVE: 1
MYALGIAS: 0
VOMITING: 0
COUGH: 0
EYES NEGATIVE: 1
NERVOUS/ANXIOUS: 0
DIZZINESS: 0
BRUISES/BLEEDS EASILY: 0
PSYCHIATRIC NEGATIVE: 1
PALPITATIONS: 0
DOUBLE VISION: 0
FOCAL WEAKNESS: 0
NEUROLOGICAL NEGATIVE: 1
MUSCULOSKELETAL NEGATIVE: 1
WEAKNESS: 0

## 2021-01-01 ASSESSMENT — PAIN DESCRIPTION - PAIN TYPE
TYPE: CHRONIC PAIN;SURGICAL PAIN
TYPE: CHRONIC PAIN
TYPE: CHRONIC PAIN;SURGICAL PAIN

## 2021-01-01 ASSESSMENT — PAIN SCALES - GENERAL: PAIN_LEVEL: 8

## 2021-01-01 ASSESSMENT — FIBROSIS 4 INDEX
FIB4 SCORE: 0.79
FIB4 SCORE: 0.94
FIB4 SCORE: 0.79

## 2021-02-04 NOTE — PROGRESS NOTES
OP   Telephone Anticoagulation Service Note      Anticoagulation Summary  As of 2/4/2021    INR goal:  2.5-3.5   TTR:  40.2 % (5.4 y)   INR used for dosing:  3.80 (1/21/2021)   Warfarin maintenance plan:  4.5 mg (3 mg x 1.5) every Mon, Wed, Fri; 3 mg (3 mg x 1) all other days   Weekly warfarin total:  25.5 mg   Plan last modified:  Roxie IslasD (11/30/2020)   Next INR check:  2/9/2021   Target end date:  Indefinite    Indications    Factor V Leiden (HCC) (Resolved) [D68.51]  Deep vein thrombosis [453.40] (Resolved) [I82.409]  Pulmonary embolism [415.19] (Resolved) [I26.99]             Anticoagulation Episode Summary     INR check location:  Anticoagulation Clinic    Preferred lab:      Send INR reminders to:      Comments:  aces      Anticoagulation Care Providers     Provider Role Specialty Phone number    MAYE Diehl Referring Family Medicine [phone]    Eliseo Eduardo M.D. Responsible Cardiology [phone]    Deacon Novak, PharmD Responsible          Anticoagulation Patient Findings     Spoke with the patient on the phone today, reporting a SUPRA-therapeutic INR of 3.8 from 2 weeks ago.   Confirmed the current warfarin dosing regimen and patient compliance.  Patient reports that since she did not hear back,  she SELF DOSED and took only 3mg the following day.   Patient denies any interval changes to diet and/or medications. Patient denies any signs/symptoms of bleeding or clotting.  Patient informed that she is due for next INR.  Patient reports she will continue with the current dosing regimen and will retest INR again on Tuesday.    Gustavo FaustinD

## 2021-02-26 NOTE — PROGRESS NOTES
OP   Telephone Anticoagulation Service Note      Anticoagulation Summary  As of 2/25/2021    INR goal:  2.5-3.5   TTR:  39.5 % (5.5 y)   INR used for dosing:  3.90 (2/25/2021)   Warfarin maintenance plan:  4.5 mg (3 mg x 1.5) every Mon, Wed; 3 mg (3 mg x 1) all other days   Weekly warfarin total:  24 mg   Plan last modified:  Elida Stoner (2/25/2021)   Next INR check:  3/11/2021   Target end date:  Indefinite    Indications    Factor V Leiden (HCC) (Resolved) [D68.51]  Deep vein thrombosis [453.40] (Resolved) [I82.409]  Pulmonary embolism [415.19] (Resolved) [I26.99]             Anticoagulation Episode Summary     INR check location:  Anticoagulation Clinic    Preferred lab:      Send INR reminders to:      Comments:  [facility]      Anticoagulation Care Providers     Provider Role Specialty Phone number    MAYE Diehl Referring Family Medicine [phone]    Eliseo Eduardo M.D. Responsible Cardiology [phone]    Deacon Novak, PharmD Responsible          Anticoagulation Patient Findings  Patient Findings     Negatives:  Signs/symptoms of thrombosis, Signs/symptoms of bleeding, Laboratory test error suspected, Change in health, Change in alcohol use, Change in activity, Upcoming invasive procedure, Emergency department visit, Upcoming dental procedure, Missed doses, Extra doses, Change in medications, Change in diet/appetite, Hospital admission, Bruising, Other complaints         Spoke with the patient on the phone today, reporting a SUPRA-therapeutic INR of 3.9.  Confirmed the current warfarin dosing regimen and patient compliance.  Patient denies any interval changes to diet and/or medications. Patient denies any signs/symptoms of bleeding or clotting.  Patient will begin reduced weekly regimen of 4.5mg on Mon and Wed and 3mg ROW.   Patient will retest again in 2 weeks.     Gustavo FaustinD

## 2021-03-16 NOTE — PROGRESS NOTES
Anticoagulation Summary  As of 3/16/2021    INR goal:  2.5-3.5   TTR:  39.2 % (5.6 y)   INR used for dosin.80 (3/16/2021)   Warfarin maintenance plan:  4.5 mg (3 mg x 1.5) every Mon, Wed; 3 mg (3 mg x 1) all other days   Weekly warfarin total:  24 mg   Plan last modified:  Elida Stoner (2021)   Next INR check:  3/22/2021   Target end date:  Indefinite    Indications    Factor V Leiden (HCC) (Resolved) [D68.51]  Deep vein thrombosis [453.40] (Resolved) [I82.409]  Pulmonary embolism [415.19] (Resolved) [I26.99]             Anticoagulation Episode Summary     INR check location:  Anticoagulation Clinic    Preferred lab:      Send INR reminders to:      Comments:  bello      Anticoagulation Care Providers     Provider Role Specialty Phone number    MAYE Diehl Referring Family Medicine [phone]    Eliseo Eduardo M.D. Responsible Cardiology [phone]    Deacon Novak, PharmD Responsible          Anticoagulation Patient Findings    Spoke with patients significant other, Jeet, on the phone.  Patient's INR is Supratherapeutic today at 4.80.      Changes in medications: Denies  Changes in diet: Denies  S/S of abnormal bleeding/brusing/clotting: Denies  Missed doses: Denies    Dosing: Patient has been SUPRA-therapeutic for 7 consecutive INR checks. Will hold for one day, then decrease weekly dose by 12.5% Will take 3 mg (1 tablet) daily. Patients significant other expressed understanding and would relay the message.      Will follow-up with patient in 6  day(s)    Consulted with PharmD prior to making recommendation.     Mitzi Fry, Pharmacy Intern

## 2021-04-06 NOTE — TELEPHONE ENCOUNTER
[facility] [place] [facility]      Pt is over due for PT/INR for warfarin monitoring. Left message for patient to have INR checked ASAP.   Clinic phone number left for any questions or concerns.    Gustavo Stoner  PharmD

## 2021-04-19 NOTE — PROGRESS NOTES
Anticoagulation Summary  As of 4/19/2021    INR goal:  2.5-3.5   TTR:  38.9 % (5.7 y)   INR used for dosing:  3.10 (4/19/2021)   Warfarin maintenance plan:  3 mg (3 mg x 1) every day   Weekly warfarin total:  21 mg   Plan last modified:  Mitzi Fry, Pharmacy Intern (3/16/2021)   Next INR check:     Target end date:  Indefinite    Indications    Factor V Leiden (HCC) (Resolved) [D68.51]  Deep vein thrombosis [453.40] (Resolved) [I82.409]  Pulmonary embolism [415.19] (Resolved) [I26.99]             Anticoagulation Episode Summary     INR check location:  Anticoagulation Clinic    Preferred lab:      Send INR reminders to:      Comments:  bello      Anticoagulation Care Providers     Provider Role Specialty Phone number    MAYE Diehl Referring Family Medicine [phone]    Eliseo Eduardo M.D. Responsible Cardiology [phone]    Deacon Novak, PharmD Responsible          Anticoagulation Patient Findings     Spoke with patient today regarding therapeutic INR of 3.10.  Patient denies any signs/symptoms of bruising or bleeding or any changes in diet and medications.  Instructed patient to call clinic with any questions or concerns. Patient has changed dose to 4.5 mg on Mondays and Fridays and 3 mg on all other days.     Follow up in 3 weeks, to reduce risk of adverse events related to this high risk medication,  Warfarin.    Christiano Monson - Student Pharmacist

## 2021-05-26 NOTE — PROGRESS NOTES
Anticoagulation Summary  As of 5/26/2021    INR goal:  2.5-3.5   TTR:  40.0 % (5.8 y)   INR used for dosing:  3.10 (5/26/2021)   Warfarin maintenance plan:  4.5 mg (3 mg x 1.5) every Mon, Fri; 3 mg (3 mg x 1) all other days   Weekly warfarin total:  24 mg   Plan last modified:  Nicolas Serrato (4/19/2021)   Next INR check:  6/9/2021   Target end date:  Indefinite    Indications    Factor V Leiden (HCC) (Resolved) [D68.51]  Deep vein thrombosis [453.40] (Resolved) [I82.409]  Pulmonary embolism [415.19] (Resolved) [I26.99]             Anticoagulation Episode Summary     INR check location:  Anticoagulation Clinic    Preferred lab:      Send INR reminders to:      Comments:  bello      Anticoagulation Care Providers     Provider Role Specialty Phone number    MAYE Diehl Referring Family Medicine [phone]    Eliseo Eduardo M.D. Responsible Cardiology [phone]    Deacon Novak, PharmD Responsible          Anticoagulation Patient Findings  Patient Findings     Negatives:  Signs/symptoms of thrombosis, Signs/symptoms of bleeding, Laboratory test error suspected, Change in health, Change in alcohol use, Change in activity, Upcoming invasive procedure, Emergency department visit, Upcoming dental procedure, Missed doses, Extra doses, Change in medications, Change in diet/appetite, Hospital admission, Bruising, Other complaints              Spoke with patient today regarding therapeutic INR of 3.1.  Patient denies any signs/symptoms of bruising or bleeding or any changes in diet and medications.  Instructed patient to call clinic with any questions or concerns.    Pt is to continue with current warfarin dosing regimen.    Follow up in 2 weeks, to reduce risk of adverse events related to this high risk medication,  Warfarin.    Charlotte Lyles, Pharmacy Intern

## 2021-07-07 NOTE — PROGRESS NOTES
Anticoagulation Summary  As of 7/6/2021    INR goal:  2.5-3.5   TTR:  40.3 % (5.9 y)   INR used for dosing:  3.80 (7/6/2021)   Warfarin maintenance plan:  4.5 mg (3 mg x 1.5) every Mon, Fri; 3 mg (3 mg x 1) all other days   Weekly warfarin total:  24 mg   Plan last modified:  Nicolas Serrato (4/19/2021)   Next INR check:  7/20/2021   Target end date:  Indefinite    Indications    Factor V Leiden (HCC) (Resolved) [D68.51]  Deep vein thrombosis [453.40] (Resolved) [I82.409]  Pulmonary embolism [415.19] (Resolved) [I26.99]             Anticoagulation Episode Summary     INR check location:  Anticoagulation Clinic    Preferred lab:      Send INR reminders to:      Comments:  bello      Anticoagulation Care Providers     Provider Role Specialty Phone number    MAYE Diehl Referring Family Medicine [phone]    Eliseo Eduardo M.D. Responsible Cardiology [phone]    Deaocn Novak, PharmD Responsible          Anticoagulation Patient Findings      Spoke with pt.  INR is supratherapeutic.   Pt denies any unusual s/s of bleeding, bruising, clotting or any changes to diet or medications. Denies any etoh, cranberries, supplements, or illness.   Pt verifies warfarin weekly dosing.     Will have pt reduce dose x 1 day then continue regimen    Repeat INR in 2 week(s).     Inocencia Still, PharmD

## 2021-07-14 PROBLEM — M54.12 CERVICAL RADICULITIS: Status: ACTIVE | Noted: 2021-01-01

## 2021-07-14 PROBLEM — M54.10 RADICULITIS: Status: ACTIVE | Noted: 2021-01-01

## 2021-07-27 PROBLEM — M75.100 ROTATOR CUFF TEAR: Status: ACTIVE | Noted: 2021-01-01

## 2021-07-27 PROBLEM — M75.40 ROTATOR CUFF IMPINGEMENT SYNDROME: Status: ACTIVE | Noted: 2021-01-01

## 2021-07-27 PROBLEM — S43.439A SLAP TEAR OF SHOULDER: Status: ACTIVE | Noted: 2021-01-01

## 2021-08-05 NOTE — OR NURSING
"Preadmit appointment: \" Preparing for your Procedure information\" sheet given to patient with verbal and written instructions. Patient instructed to continue prescribed medications through the day before surgery, instructed to take the following medications the day of surgery per anesthesia protocol:  ADVAIR, ALBUTEROL, verbal and written instructions given to bring DOS, SOMA, DURAGESIC, FLONASE, NEURONTIN, NORCO, ATROVENT, CLARITIN, TOPROL, NITOLINGUAL          Verbal and written instructions on self isolation prior to surgery and covid symptoms to watch for given to patient, pt advised to notify MD if any symptoms develop.    CBC. BMP, ECG, Covid ordered and collected 08/05/21.    Fall Risk ordered and protocol initiated, STOP/BANG protocol initiated.    Verbal and written instructions given to bring CPAP machine and mask and oxygen DOS.    Pacemaker Information Request form FAX to [facility]    Pt. States she will contact [facility] for Coumadin and aspirin bridging and discontinuation instructions. States she has \"done this many times for surgeries\".     "

## 2021-08-06 NOTE — OR NURSING
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 She is followed by the [facility] and [facility], she is on Coumadin. She will be contacting them to get instructions on when to stop her Coumadin. Today we collected q CBC w/diff, BMP and ECG which I have included, although it has not been confirmed. Would you like a cardiac clearance for this pt or anything else?    Thank you for looking at this pt,  Cira KENDALL RN          Anesthesia Summary Report           Patient Name: Phyllis Perez MRN: [id number] Admission Date: Patient not admitted

## 2021-08-06 NOTE — PROGRESS NOTES
[facility]    Pt to have procedure on 8/12/21. Due to pt history lovenox bridging is indicated.   Pt to follow instructions as below, after procedure to ask operating MD when safe to resume anticoagulation, if no instructions given, pt will follow as below.     Spoke with pt via phone regarding upcoming procedure. Pt has used Lovenox in the past and familiar with bridging instructions. Pt was able to repeat back instructions for anticoagulation, and asked to call clinic with any issues/concerns.      CHADSvasc n/a  Clot history DVT + PE (INR Goal 2.5-3.5)    Current weight: 95 kg  CrCl = ~112 mL/min    Lab Results   Component Value Date/Time    BUN 9 08/05/2021 02:50 PM    CREATININE 0.79 08/05/2021 02:50 PM          Used Lovenox before Yes        Date  Warfarin dosing PM Lovenox   5 Days before procedure 8/7 0mg NONE   4 Days before procedure 8/8 0mg Lovenox injection   3 Days before procedure 8/9 0mg Lovenox injection   2 Days before procedure 8/10 0mg Lovenox injection   1 Days before procedure 8/11 0mg NONE   PROCEDURE 8/12 4.5mg (if appropriate per provider) NONE   1 Day after procedure 8/13 4.5mg Restart Lovenox injections      2 Days after procedure 8/14 4.5mg Lovenox injection   3 Days after procedure 8/15 3mg Lovenox injection   4 Days after procedure 8/16 4.5mg GET INR checked     Rashaad Martins, RoxieD, [facility]

## 2021-08-06 NOTE — OR NURSING
Dr Joe's response:    This is an extremely complicated patient with multi system disease. I think we should start with a PCP clearance and possible referral to cardiology and maybe pulmonary for further evaluation prior to the surgery. I’m afraid that this surgery might have to be delayed.  Thank you.   Nancy Joe M.D.  [facility]    Called Dr Arellano's office and JAYNA fairchild/ Ning regarding above request and faxed this response to Dr Arellano's office.

## 2021-08-09 NOTE — OR NURSING
LM for Sofia PETE @ Dr Arellano's office regarding need for PCP clearance, possible cardiology and pulmonary clearance, and per Dr Joe, the case may need to be delayed. Request she call PAT RN back.    Spoke with Ning @ Dr Arellano's office to inform of above and also need consent for surgery updated with the specific side of procedure. She will follow up both.

## 2021-08-12 NOTE — TELEPHONE ENCOUNTER
----- Message from Vipin Redd M.D. sent at 8/12/2021 11:17 AM PDT -----  Please send surgical clearance to Eliseo Bal for NATASHA with moderate risk on beta blockade therapy and bridging which she is already on.     Thanks.  JOSEPH

## 2021-08-12 NOTE — PROGRESS NOTES
"Chief Complaint   Patient presents with   • Hyperlipidemia     F/V Dx: Mixed hyperlipidemia   • Shortness of Breath       Subjective:   Phyllis Perez is a 61 y.o. female who presents today for preoperative cardiovascular examination.    Since the patient's last visit on 11/10/20, she has been doing well clinically. She admits shortness of breath due to lung issue. She denies chest pain,  palpitations, nausea/vomiting or diaphoresis. She was scheduled for right shoulder surgery today which was cancelled due to her abnormal EKG.    Past Medical History:   Diagnosis Date   • Anemia    • Anemia 2018   • Anginal syndrome (HCC) 2010   • Anticoagulation monitoring, special range     coumadin therapy   • Asthma    • Blood clotting disorder ([facility])     DVT left leg, right arm, PE right lung, Factor 5 Leiden   • Breath shortness    • CAD (coronary artery disease)     Old MI/POBA   • Cancer (HCC) 1986    Uterine CX   • Cataract     Left eye   • Clotting disorder ([facility])     Factor V Leiden, IVC filter for hx of DVT, PE   • Dental disorder     Bilateral Upper X2 Implants; Bilateral Bottom X2 Implants   • Emphysema (subcutaneous) (surgical) resulting from a procedure     asthma   • Heart murmur    • High cholesterol    • Hot flashes    • Hyperlipemia, mixed    • Hypertension    • Muscle spasm    • Old MI (myocardial infarction) 2003, 2010    Treated with POBA; Cath without obstructive DZ 2009   • Ovarian cancer (HCC)    • Oxygen dependent     24hrs/day 3L   • Pacemaker    • Pain     \"Head to toe\"   • Pneumonia 2019   • Psychiatric problem     depression   • Restless leg syndrome    • Sleep apnea     CPAP   • SSS (sick sinus syndrome) ([facility])     Tx with pacer   • Uterine cancer ([facility])      Past Surgical History:   Procedure Laterality Date   • CERVICAL FUSION POSTERIOR  4/12/2017    Procedure: CERVICAL FUSION POSTERIOR- WITH INSTRUMENTATION C4-5, C5-6;  Surgeon: Alejandro Rayo M.D.;  Location: SURGERY [facility];  " Service:    • PACEMAKER INSERTION      bradycardia   • ANGIOPLASTY      ?   • CHOLECYSTECTOMY     • ELBOW EXPLORATION      cts   • HYSTERECTOMY LAPAROSCOPY     • KNEE REPLACEMENT, TOTAL     • KNEE REVISION TOTAL     • LUMBAR EXPLORATION      buck   • NASAL RECONSTRUCTION     • OOPHORECTOMY     • OTHER SURGICAL PROCEDURE      IVC filter placement   • SHOULDER ARTHROPLASTY TOTAL REVERSED     • ZZZ CARDIAC CATH       Family History   Problem Relation Age of Onset   • Cancer Mother    • Cancer Father    • Cancer Maternal Aunt    • Cancer Maternal Uncle    • Heart Disease Neg Hx      Social History     Socioeconomic History   • Marital status:      Spouse name: Not on file   • Number of children: Not on file   • Years of education: Not on file   • Highest education level: Not on file   Occupational History   • Not on file   Tobacco Use   • Smoking status: Former Smoker     Packs/day: 0.50     Years: 15.00     Pack years: 7.50     Quit date: 7/10/2001     Years since quittin.1   • Smokeless tobacco: Never Used   Vaping Use   • Vaping Use: Never used   Substance and Sexual Activity   • Alcohol use: Yes     Alcohol/week: 0.6 oz     Types: 1 Cans of beer per week     Comment: Very rarely once a yr   • Drug use: No   • Sexual activity: Not Currently     Partners: Male   Other Topics Concern   • Not on file   Social History Narrative   • Not on file     Social Determinants of Health     Financial Resource Strain:    • Difficulty of Paying Living Expenses:    Food Insecurity:    • Worried About Running Out of Food in the Last Year:    • Ran Out of Food in the Last Year:    Transportation Needs:    • Lack of Transportation (Medical):    • Lack of Transportation (Non-Medical):    Physical Activity:    • Days of Exercise per Week:    • Minutes of Exercise per Session:    Stress:    • Feeling of Stress :    Social Connections:    • Frequency of Communication with Friends and Family:    • Frequency of Social  Gatherings with Friends and Family:    • Attends Yazidism Services:    • Active Member of Clubs or Organizations:    • Attends Club or Organization Meetings:    • Marital Status:    Intimate Partner Violence:    • Fear of Current or Ex-Partner:    • Emotionally Abused:    • Physically Abused:    • Sexually Abused:      Allergies   Allergen Reactions   • Sulfamethoxazole-Trimethoprim      Other reaction(s): Diarrhea   • Trimethoprim      Other reaction(s): GI Problems   • Bactrim [Sulfamethoxazole W-Trimethoprim]      constipation   • Morphine      Severe HA   • Other Misc Hives     cloraprep   • Tape Anaphylaxis     Blistering and then pop, paper tape is ok     (Medications reviewed.)  Outpatient Encounter Medications as of 8/12/2021   Medication Sig Dispense Refill   • fentaNYL (DURAGESIC) 75 MCG/HR PATCH 72 HR 1 to skin every 72 hrs    30 day supply 10 Patch 0   • carisoprodol (SOMA) 350 MG Tab 1 po tid prn spasms   30 day supply 90 tablet 0   • amitriptyline (ELAVIL) 25 MG Tab 1-4 po qhs prn 80 tablet 0   • HYDROcodone/acetaminophen (NORCO)  MG Tab 1 po q 4-6 prn pain    30 day supply 180 tablet 0   • enoxaparin (LOVENOX) 150 MG/ML Solution Inject 150 mg under the skin every day for 10 days. 10 Each 1   • gabapentin (NEURONTIN) 100 MG Cap      • warfarin (COUMADIN) 3 MG Tab Take one and one-half tablets daily as directed by [facility] 135 Tab 1   • ezetimibe (ZETIA) 10 MG Tab TAKE 1 TABLET BY MOUTH DAILY 90 Tab 3   • atorvastatin (LIPITOR) 20 MG Tab Take  by mouth every day. Take 1 tablet by mouth daily     • ipratropium (ATROVENT) 0.03 % Solution INSTILL 2 SPRAYS INTO EACH NOSTRIL BID OR TID     • nitroglycerin (NITROLINGUAL) 0.4 MG/SPRAY Solution Place 1 Spray under tongue as needed (chest pain). 1 Bottle 1   • metoprolol SR (TOPROL XL) 25 MG TABLET SR 24 HR Take 0.5 Tabs by mouth every evening. 45 Tab 3   • potassium chloride ER (KLOR-CON) 10 MEQ tablet Take 1 Tab by mouth every  day.  5   • ropinirole (REQUIP) 4 MG tablet Take 4 mg by mouth every evening.     • temazepam (RESTORIL) 30 MG capsule Take 1 Cap by mouth at bedtime as needed for Sleep. Fill at monthly intervals or greater. 30 Cap 0   • ADVAIR DISKUS 250-50 MCG/DOSE AEROSOL POWDER, BREATH ACTIVATED Inhale 1 Puff by mouth every day.  11   • hydrocodone/acetaminophen (NORCO)  MG Tab Take 1 tablet by mouth every 4 hours as needed for breakthrough pain. The earliest date the pharmacy may fill the rx is 7/18/2016. 180 Tab 0   • aspirin (ASA) 81 MG Chew Tab chewable tablet Take 81 mg by mouth every day.     • albuterol (VENTOLIN OR PROVENTIL) 108 (90 BASE) MCG/ACT AERS inhalation aerosol Inhale 2 Puffs by mouth every 6 hours as needed for Shortness of Breath. 8.5 g 3   • fluticasone (FLONASE) 50 MCG/ACT nasal spray Spray 1 Spray in nose every day. 16 g 11   • Loratadine (CLARITIN) 10 MG CAPS Take 1 Cap by mouth every day. 30 Cap 11   • ferrous sulfate 325 (65 FE) MG tablet Take 1 Tab by mouth every day. 30 Tab 3   • fentanyl (DURAGESIC) 100 MCG/HR PATCH 72 HR Apply 1 Patch to skin as directed every 72 hours. (Patient not taking: Reported on 8/12/2021)  0     No facility-administered encounter medications on file as of 8/12/2021.     Review of Systems   Constitutional: Negative.  Negative for chills, fever, malaise/fatigue and weight loss.   HENT: Negative.  Negative for hearing loss.    Eyes: Negative.  Negative for blurred vision and double vision.   Respiratory: Negative.  Negative for cough and shortness of breath.    Cardiovascular: Negative.  Negative for chest pain, palpitations, claudication and leg swelling.   Gastrointestinal: Negative.  Negative for abdominal pain, nausea and vomiting.   Genitourinary: Negative.  Negative for dysuria and urgency.   Musculoskeletal: Negative.  Negative for joint pain and myalgias.   Skin: Negative.  Negative for itching and rash.   Neurological: Negative.  Negative for dizziness, focal  "weakness, weakness and headaches.   Endo/Heme/Allergies: Negative.  Does not bruise/bleed easily.   Psychiatric/Behavioral: Negative.  Negative for depression. The patient is not nervous/anxious.         Objective:   /68 (BP Location: Left arm, Patient Position: Sitting, BP Cuff Size: Adult)   Pulse (!) 103   Resp 14   Ht 1.727 m (5' 8\")   Wt 86.2 kg (190 lb)   SpO2 97%   BMI 28.89 kg/m²     Physical Exam   Constitutional: She is oriented to person, place, and time. She appears well-developed.   HENT:   Head: Normocephalic and atraumatic.   Neck: No JVD present.   Cardiovascular: Normal rate, regular rhythm and normal heart sounds.   Pulmonary/Chest: Effort normal and breath sounds normal.   Abdominal: Soft. Bowel sounds are normal.   No hepatosplenomegaly.   Musculoskeletal:         General: Normal range of motion.   Lymphadenopathy:     She has no cervical adenopathy.   Neurological: She is alert and oriented to person, place, and time.   Skin: Skin is warm and dry.     CARDIAC STUDIES/PROCEDURES:     CAROTID ULTRASOUND (11/02/15)  Minimal atherosclerotic plaque involving the right internal carotid artery.   No evidence of a hemodynamically significant stenosis.  No significant atherosclerotic plaque involving the left internal carotid artery.   No evidence of a hemodynamically significant stenosis.   Normal antegrade vertebral artery flow bilaterally.     ECHOCARDIOGRAM CONCLUSIONS (08/17/20)  Prior study done 4/26/18, compared to the report of the study done -   there has been no significant change.   Normal left ventricular systolic function.  Left ventricular ejection fraction is visually estimated to be 60%.  Pacer/ICD wire seen in right ventricle.  No significant valve abnormalities.   Estimated right ventricular systolic pressure is 20 mmHg.     ECHOCARDIOGRAM CONCLUSIONS (04/26/18)  Normal left ventricular chamber size.    Normal left ventricular wall thickness.   Mild mid septal hypokinesis. "   Left ventricular ejection fraction is visually estimated to be 55%.     Grade I diastolic dysfunction.  Compared to the images of the prior study done 9/2015 -    there has been no significant change.      EKG performed on (08/05/21) was reviewed: EKG personally interpreted shows sinus rhythm with premature ventricular contractions.  EKG performed on (03/10/20) EKG shows sinus rhythm with early R/S transition.     Laboratory results of (03/03/20) Cholesterol profile of 140/126/50/65 noted.     MYOCARDIAL PERFUSION STUDY CONCLUSIONS (08/17/20)  No evidence of significant jeopardized viable myocardium or prior myocardial infarction.  Normal left ventricular size, ejection fraction, and wall motion.  LV ejection fraction = 69%.  ECG INTERPRETATION  Negative stress ECG for ischemia.     PACEMAKER PLACEMENT in [place]   [facility]     PET SCAN (10/21/15)  1.  Normal PET myocardial stress perfusion test for ischemia.  2.  No arrhythmias.  3.  Preserved ejection fraction 74%.     TILT TABLE EVALUATION (09/12/18)  1.  Symptomatic bradycardia and sinus tachycardia with tilt table.    No syncope appreciated.  2.  Above findings are likely compatible with vasovagal reaction.    Assessment:     1. Preoperative cardiovascular examination     2. Coronary arteriosclerosis     3. Mixed hyperlipidemia         Medical Decision Making:  Today's Assessment / Status / Plan:     1. Presurgical evaluation: She is pending surgery and is doing well from cardiac standpoint. She may proceed with surgery from cardiac standpoint with moderate risk on beta blockade therapy with bridging.  2. Coronary artery disease (Remote diagnosis in [place]): She remains clinically doing well. I will continue with current medical care including metoprolol and atorvastatin.  3. Hyperlipidemia: She is doing well on statin therapy without myalgia symptoms.  4. Pacemaker placement: The pacemake is functioning well.  We will continue with device  interrogation.  5. Factor V Leiden with IVC filter and chronic anticoagulation therapy (warfarin).  6. Chronic obstructive pulmonary disease on chronic oxygen therapy (3 L/min)     We will follow up in 3 months.    CC Polina Rowell

## 2021-08-12 NOTE — LETTER
PROCEDURE/SURGERY CLEARANCE FORM      Encounter Date: 8/12/2021    Patient: Phyllis Perez  YOB: 1960    CARDIOLOGIST:  Vipin Redd MD    REFERRING DOCTOR:  Eliseo Arellano MD    The above patient may proceed from a cardiac standpoint with moderate risk on beta blockade therapy and anticoagulation bridging therapy with the following procedure/surgery: Right shoulder arthroscopic labral debridement, possible rotator cuff repair, subacromial decompression and repairs as indicated.                                           Additional comments: May hold warfarin as needed with bridging therapy as directed by anticoagulation clinic                 Vipin Redd MD  Electronically signed

## 2021-08-13 NOTE — PROGRESS NOTES
[facility]    Received phone call from pt reporting her procedure has been cancelled. She has been holding warfarin and bridging with lovenox. She did not restart warfarin as of yet. Pt advised to restart warfarin as instructed in previous anticoagulation note (8/6/21) and will test INR in 5 days.      Rashaad Martins, PharmD, [facility] at [phone], fax [phone]

## 2021-08-21 NOTE — PROGRESS NOTES
Anticoagulation Summary  As of 2021    INR goal:  2.5-3.5   TTR:  40.7 % (6 y)   INR used for dosin.10 (2021)   Warfarin maintenance plan:  4.5 mg (3 mg x 1.5) every Mon, Fri; 3 mg (3 mg x 1) all other days   Weekly warfarin total:  24 mg   Plan last modified:  Nicolas Serrato (2021)   Next INR check:  2021   Target end date:  Indefinite    Indications    Factor V Leiden (HCC) (Resolved) [D68.51]  Deep vein thrombosis [453.40] (Resolved) [I82.409]  Pulmonary embolism [415.19] (Resolved) [I26.99]             Anticoagulation Episode Summary     INR check location:  Anticoagulation Clinic    Preferred lab:      Send INR reminders to:      Comments:  bello      Anticoagulation Care Providers     Provider Role Specialty Phone number    MAYE Diehl Referring Family Medicine [phone]    Eliseo Eduardo M.D. Responsible Cardiology [phone]    Deacon Novak, PharmD Responsible          Anticoagulation Patient Findings          HPI:  Phyllis Ryan Ana, on anticoagulation therapy with warfarin for Factor V.  Changes to current medical/health status since last appt: none  Denies signs/symptoms of bleeding and/or thrombosis since the last appt.    Denies any interval changes to diet  Denies any interval changes to medications since last appt.   Denies any complications or cost restrictions with current therapy.     A/P   INR  SUB-therapeutic.   Bolus warfarin 4.5 mg daily until next INR.  Restart enoxaparin until next INR.     Next INR in 3 days.     Russell Fraire, PharmD

## 2021-08-24 NOTE — PROGRESS NOTES
OP Telephone Anticoagulation Service Note    Date: 2021      Anticoagulation Summary  As of 2021    INR goal:  2.5-3.5   TTR:  40.7 % (6 y)   INR used for dosin.70 (2021)   Warfarin maintenance plan:  4.5 mg (3 mg x 1.5) every Mon, Fri; 3 mg (3 mg x 1) all other days   Weekly warfarin total:  24 mg   Plan last modified:  Nicolas Serrato (2021)   Next INR check:  2021   Target end date:  Indefinite    Indications    Factor V Leiden (HCC) (Resolved) [D68.51]  Deep vein thrombosis [453.40] (Resolved) [I82.409]  Pulmonary embolism [415.19] (Resolved) [I26.99]             Anticoagulation Episode Summary     INR check location:  Anticoagulation Clinic    Preferred lab:      Send INR reminders to:      Comments:  bello      Anticoagulation Care Providers     Provider Role Specialty Phone number    MAYE Diehl Referring Family Medicine [phone]    Eliseo Eduardo M.D. Responsible Cardiology [phone]    Deacon Novak, PharmD Responsible          Anticoagulation Patient Findings  Patient Findings     Negatives:  Signs/symptoms of thrombosis, Signs/symptoms of bleeding, Laboratory test error suspected, Change in health, Change in alcohol use, Change in activity, Upcoming invasive procedure, Emergency department visit, Upcoming dental procedure, Missed doses, Extra doses, Change in medications, Change in diet/appetite, Hospital admission, Bruising, Other complaints            Plan: Spoke with patient on the phone.   Patient is  therapeutic today.   Confirmed dosing. She is currently bridging with lkovenox    Pt on antiplatelet therapy Aspirin 81mg for CAD   Instructed patient to call clinic if any unusual bleeding or bruising occurs.   Will continue dosing as outlined. Stop lovenox  Will follow-up with patient in 1 week(s).          Carmencita Gandhi, PharmD

## 2021-09-08 NOTE — PROGRESS NOTES
Pt to have procedure on 9/16/21. Due to pt history lovenox bridging is indicated.   Pt to follow instructions as below, after procedure to ask operating MD when safe to resume anticoagulation, if no instructions given, pt will follow as below    CHADSvasc - N/A  Clot history - Hx of DVT/PE d/t FVL    Current weight 86.2 kg  CrCl = ~ 88 mL/min  Lab Results   Component Value Date/Time    BUN 9 08/05/2021 02:50 PM    CREATININE 0.79 08/05/2021 02:50 PM        Used Lovenox before - Yes        Date  Warfarin dosing AM Lovenox PM Lovenox   5 Days before procedure 9/11 0mg NONE NONE   4 Days before procedure 9/12 0mg Lovenox injection Lovenox injection   3 Days before procedure 9/13 0mg Lovenox injection Lovenox injection   2 Days before procedure 9/14 0mg Lovenox injection Lovenox injection   1 Days before procedure 9/15 0mg NONE NONE   PROCEDURE 9/16 6 mg NONE NONE   1 Day after procedure 9/17 6 mg Restart Lovenox injections    Lovenox injection   2 Days after procedure 9/18 3 mg Lovenox injection Lovenox injection   3 Days after procedure 9/19 3 mg Lovenox injection Lovenox injection   4 Days after procedure 9/20 4.5 mg Lovenox injection Lovenox injection   5 Days after procedure 9/21 - Lovenox injection GET INR checked       Instructions emailed to pt ([email or web address]) per pt request. Lovenox Rx sent to pt's preferred drug outlet.    Roxie WrightD

## 2021-09-13 NOTE — PREPROCEDURE INSTRUCTIONS
"Pre-admit appointment completed. \"Preparing for your Procedure\" instructions given to Pt with verbal, written, and video content. Pt states all instructions given are understood and to call pre-admit or Dr's office for additional questions or any symptoms of illness/covid develop prior to DOS. Medications the patient will take the morning of surgery per anesthesia protocol: Soma, Gabapentin, Norco. Instructed to take other prescribed medications through the day before surgery.    Pt states that she has received instructions of when to stop [facility]madin [facility].    Advised to bring CPAP on day of surgery.    Denies anesthesia complications      "

## 2021-09-14 NOTE — PROGRESS NOTES
Received call from pt asking if it is ok to take acetaminophen. Ensure pt it is safe to do so with warfarin.  Inocencia Still, RoxieD

## 2021-09-16 PROBLEM — G47.33 OBSTRUCTIVE SLEEP APNEA SYNDROME: Status: ACTIVE | Noted: 2021-01-01

## 2021-09-16 NOTE — LETTER
September 7, 2021    [facility]  [address] [address]  [phone]    Patient Name: Phyllis Perez  Surgeon Name: Surgeon(s):  Santiago Arellano M.D.  Surgery Facility: [facility] ([address] [facility])  Surgery Date: 9/16/2021    The time of your surgery is not final and may change up to and until the day of your surgery. You will be contacted 24-48 hours prior to your surgery date with your check-in and surgery time.    If you will not be at one of the below numbers please call/text the surgery scheduler at [facility] ([phone])  Cell Phone: [phone]    BEFORE YOUR SURGERY  Pre Registration and/or Lab Work must be done within and no earlier than 28 days prior to your surgery date. Please call [phone] for an appointment as soon as possible.    Please refrain from smoking any substance after midnight prior to surgery. Smoking may interfere with the anesthetic and frequently produces nausea during the recovery period.    Continue taking all lifesaving medications. Including the morning of your surgery with small sip of water.    Please read the MEDICATION INSTRUCTIONS below completely.    DAY OF YOUR SURGERY  Nothing to eat or drink after midnight     Please arrive at the hospital/surgery center at the check-in time provided.     An adult will need to bring you and take you home after your surgery.     AFTER YOUR SURGERY  Post op Appointment:   Date: 09/29/2021   Time: 10:15AM   With: SANTIAGO ARELLANO MD   Location: [address] [facility] [phone]    - Therapy- Your first appointment should be 3  day(s) after your surgery. For your convenience we have 4 Physical Therapy locations: [place], [facility], [place], and [facility]. Call our office ASAP to schedule an appointment at [phone] or take the enclosed Therapy Prescription to a facility of your choice.    TIME OFF WORK  FMLA or Disability forms can be faxed directly to:  [phone] or you may drop them off at [address] [place] [phone]. Our office charges a $35.00 fee per form. Forms will be completed within 10 business days of drop off and payment received. For the status of your forms you may contact our disability office directly [phone].    MEDICATION INSTRUCTIONS  The following medications should be stopped a minimum of 10 days prior to surgery:  All over the counter, Supplements & Herbal medications    Anorectics: Phentermine (Adipex-P, Lomaira and Suprenza), Phentermine-topiramate (Qsymia), Bupropion-naltrexone (Contrave)    Opiod Partial Agonists/Opioid Antagonists: Buprenorphine (Subocone, Belbuca, Butrans, Probuphine Implant, Sublocade), Naltrexone (ReVia, Vivitrol), Naloxone    Amphetamines: Dextroamphetamine/Amphetamine (Adderall, Mydayis), Methylphenidate Hydrochloride (Concerta, Metadate, Methylin, Ritalin)    The following medications should be stopped 5 days prior to surgery:  Blood Thinners: Any Aspirin, Aspirin products, anti-inflammatories such as ibuprofen and any blood thinners such as Coumadin and Plavix. Please consult your prescribing physician if you are on life saving blood thinners, in regards to when to stop medications prior to surgery.     The following medications should be stopped a minimum of 3 days prior to surgery:  PDE-5 inhibitors: Sildenafil (Viagra), Tadalafil (Cialis), Vardenafil (Levitra), Avanafil (Stendra)    MAO Inhibitors: Rasagiline (Azilect), Selegiline (Eldepryl, Emsam, Selapar), Isocarboxazid (Marplan), Phenelzine (Nardil)

## 2021-09-16 NOTE — OR NURSING
0961 Pt has received x2 doses of 5mg Ephedrine. Her systolic came up from 74 to 101 after 2nd dose. Now 10 minutes later its BP 85/63 HR 75. fluids are running WO. She is asymptomatic. No IV narcotics given yet but pain is 8/10. 10 mg oxycodone given 20 min ago. No remaining doses of ephedrine to give per MAR. Dr Marshall updated.     0959 Dr Marshall @ [facility]. OK with SBP >85. RN to monitor for another 30 min. If maintaining SBP >85 and asymptomatic, OK to DC.    1000 Pt SO updated on progress, POC and ETA for DC    1016 Dr Marshall @ [facility]. Pt maintaining SBP > 85 and asymptomatic. OK to DC.     1027 Pt states pain is @ baseline and tolerable, denies nausea. Pt tolerating PO fluids. Pt to phase II

## 2021-09-16 NOTE — ANESTHESIA PREPROCEDURE EVALUATION
"61F here for shoulder arthroscopy and rotator cuff repair    Patient currently rates pain at 7/10 preoperatively, states that it never gets any lower than this    Allergies   Allergen Reactions   • Sulfamethoxazole-Trimethoprim      Other reaction(s): Diarrhea   • Trimethoprim      Other reaction(s): GI Problems   • Bactrim [Sulfamethoxazole W-Trimethoprim]      constipation   • Chloraprep One Step    • Morphine      Severe HA   • Other Misc Hives     cloraprep   • Tape Anaphylaxis     Blistering and then pop, paper tape is ok      Relevant Problems   ANESTHESIA   (positive) Obstructive sleep apnea syndrome      PULMONARY   (positive) COPD (chronic obstructive pulmonary disease) (HCC) (3L home O2 )   (positive) Shortness of breath      CARDIAC   (positive) Cardiac pacemaker in situ   (positive) Coronary arteriosclerosis   (positive) Migraine without status migrainosus, not intractable     Past Medical History:   Diagnosis Date   • Anemia    • Anemia 2018   • 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   • Anticoagulation monitoring, special range     coumadin therapy   • Asthma    • Blood clotting disorder ([facility])     DVT left leg, right arm, PE right lung, Factor 5 Leiden   • Breath shortness    • CAD (coronary artery disease)     Old MI/POBA   • Cancer ([facility]) 1986    Uterine CX   • Cataract     Left eye   • Clotting disorder ([facility])     Factor V Leiden, IVC filter for hx of DVT, PE   • Dental disorder     Bilateral Upper X2 Implants; Bilateral Bottom X2 Implants   • Emphysema (subcutaneous) (surgical) resulting from a procedure     asthma   • Heart murmur    • High cholesterol    • Hot flashes    • Hyperlipemia, mixed    • Hypertension    • Muscle spasm    • Old MI (myocardial infarction) 2003, 2010    Treated with POBA; Cath without obstructive DZ 2009   • Ovarian cancer ([facility])    • Oxygen dependent     24hrs/day 3L   • Pacemaker    • Pain     \"Head to toe\"   • Pneumonia 2019   • Psychiatric problem     depression   • Restless leg " syndrome    • Sleep apnea     CPAP   • SSS (sick sinus syndrome) ([facility])     Tx with pacer   • Uterine cancer ([facility])       No current facility-administered medications on file prior to encounter.     Current Outpatient Medications on File Prior to Encounter   Medication Sig Dispense Refill   • ezetimibe (ZETIA) 10 MG Tab TAKE 1 TABLET BY MOUTH DAILY 90 Tab 3   • atorvastatin (LIPITOR) 20 MG Tab Take  by mouth every day. Take 1 tablet by mouth daily     • ipratropium (ATROVENT) 0.03 % Solution INSTILL 2 SPRAYS INTO EACH NOSTRIL BID OR TID     • nitroglycerin (NITROLINGUAL) 0.4 MG/SPRAY Solution Place 1 Spray under tongue as needed (chest pain). 1 Bottle 1   • metoprolol SR (TOPROL XL) 25 MG TABLET SR 24 HR Take 0.5 Tabs by mouth every evening. 45 Tab 3   • potassium chloride ER (KLOR-CON) 10 MEQ tablet Take 1 Tab by mouth every day.  5   • ropinirole (REQUIP) 4 MG tablet Take 4 mg by mouth every evening.     • temazepam (RESTORIL) 30 MG capsule Take 1 Cap by mouth at bedtime as needed for Sleep. Fill at monthly intervals or greater. 30 Cap 0   • ADVAIR DISKUS 250-50 MCG/DOSE AEROSOL POWDER, BREATH ACTIVATED Inhale 1 Puff by mouth every day.  11   • hydrocodone/acetaminophen (NORCO)  MG Tab Take 1 tablet by mouth every 4 hours as needed for breakthrough pain. The earliest date the pharmacy may fill the rx is 7/18/2016. 180 Tab 0   • aspirin (ASA) 81 MG Chew Tab chewable tablet Take 81 mg by mouth every day. (Patient not taking: Reported on 9/13/2021)     • albuterol (VENTOLIN OR PROVENTIL) 108 (90 BASE) MCG/ACT AERS inhalation aerosol Inhale 2 Puffs by mouth every 6 hours as needed for Shortness of Breath. 8.5 g 3   • fluticasone (FLONASE) 50 MCG/ACT nasal spray Spray 1 Spray in nose every day. 16 g 11   • Loratadine (CLARITIN) 10 MG CAPS Take 1 Cap by mouth every day. 30 Cap 11   • ferrous sulfate 325 (65 FE) MG tablet Take 1 Tab by mouth every day. 30 Tab 3      Past Surgical History:   Procedure Laterality Date    • CERVICAL FUSION POSTERIOR  4/12/2017    Procedure: CERVICAL FUSION POSTERIOR- WITH INSTRUMENTATION C4-5, C5-6;  Surgeon: Alejandro Rayo M.D.;  Location: SURGERY [facility];  Service:    • PACEMAKER INSERTION  2010    bradycardia   • ANGIOPLASTY      ?2004   • CHOLECYSTECTOMY     • ELBOW EXPLORATION      cts   • HYSTERECTOMY LAPAROSCOPY     • KNEE REPLACEMENT, TOTAL     • KNEE REVISION TOTAL     • LUMBAR EXPLORATION      buck   • NASAL RECONSTRUCTION     • OOPHORECTOMY     • OTHER SURGICAL PROCEDURE      IVC filter placement   • SHOULDER ARTHROPLASTY TOTAL REVERSED     • ZZZ CARDIAC CATH        Vitals:    09/16/21 0603   BP: 137/91   Pulse: (!) 103   Resp: 20   Temp: (!) 35.6 °C (96 °F)   SpO2: 96%      Physical Exam    Airway   Mallampati: II  TM distance: <3 FB  Neck ROM: limited       Cardiovascular - normal exam     Dental   (+) upper dentures, lower dentures           Pulmonary   Breath sounds clear to auscultation     Abdominal    Neurological - normal exam                 Anesthesia Plan    ASA 4 (Patient on constant 3L home O2)   ASA physical status 4 criteria: respiratory failure    Plan - general and peripheral nerve block     Peripheral nerve block will be post-op pain control  Airway plan will be LMA        Plan Factors:   Patient was previously instructed to abstain from smoking on day of procedure.  Patient did not smoke on day of procedure.      Induction: intravenous    Postoperative Plan: Postoperative administration of opioids is intended.    Pertinent diagnostic labs and testing reviewed    Informed Consent:    Anesthetic plan and risks discussed with patient.

## 2021-09-16 NOTE — OR NURSING
0814 To PACU from OR via gurney, side rails up x 2 for safety, lungs clear bilaterally, scds on patient and machine operational, dressing CDI to R shoulder with +1 R radial pulse, cool pale fingers with 4 sec cap refill. Ice pack placed to R shoulder with immobilizer in place. HOB elevated to 15 degrees. Pt does not arouse to voice or touch. Fentanyl patch in place over sternum, scopolamine patch noted behind L ear.    0825 Pt reaching for nose and rubbing at face; pt arouses to voice and responds appropriately to RN. No snoring noted. Pacemaker noted to L chest without pacer spikes.   0840 Pt denies pain or nausea. Instructed to DB/C; demonstrated understanding; will titrate to home O2 orders of 3L [place]. Pt has portable concentrator with extra battery per sign other.   0850 Pt grimacing and c/o 8/10 pain to R shoulder and restless on gurney, moaning and requesting pain medication. HOB elevated to 30 degrees and ice pack remains in place. R fingers wiggle with <3 sec cap refill.   0900 Pt resting quietly with SBP <80 but arouses to voice; Ephedrine given. Report to CAMACHO Garcia

## 2021-09-16 NOTE — ANESTHESIA PROCEDURE NOTES
Airway    Date/Time: 9/16/2021 7:10 AM  Performed by: Doar Marshall M.D.  Authorized by: Dora Marshall M.D.     Location:  OR  Urgency:  Elective  Indications for Airway Management:  Anesthesia      Spontaneous Ventilation: absent    Sedation Level:  Deep  Preoxygenated: Yes    Patient Position:  Sniffing  Mask Difficulty Assessment:  0 - not attempted  Final Airway Type:  Supraglottic airway  Final Supraglottic Airway:  Standard LMA    SGA Size:  4  Cuff Pressure (cm H2O):  40  Airway Seal Pressure (cm H2O):  20  Number of Attempts at Approach:  1   Cuff pressure measured via integrated color-based cuff pressure indicator.

## 2021-09-16 NOTE — DISCHARGE INSTRUCTIONS
ACTIVITY: Rest and take it easy for the first 24 hours.  A responsible adult is recommended to remain with you during that time.  It is normal to feel sleepy.  We encourage you to not do anything that requires balance, judgment or coordination.    MILD FLU-LIKE SYMPTOMS ARE NORMAL. YOU MAY EXPERIENCE GENERALIZED MUSCLE ACHES, THROAT IRRITATION, HEADACHE AND/OR SOME NAUSEA.    FOR 24 HOURS DO NOT:  Drive, operate machinery or run household appliances.  Drink beer or alcoholic beverages.   Make important decisions or sign legal documents.    SPECIAL INSTRUCTIONS:   Ultrasling on at all times.  Needs to sleep in ultrasling.  May start Codmans pendulum exercises only.  May shower.  Keep dressings on and dry for 3 days then may change to band-aids.  Start P.T. within 5 days.  Call for questions or concerns.  Follow up appt as scheduled.  Remove scopolamine patch after 72 hours; immediately wash skin with soap/water and wash hands.    DIET: To avoid nausea, slowly advance diet as tolerated, avoiding spicy or greasy foods for the first day.  Add more substantial food to your diet according to your physician's instructions. INCREASE FLUIDS AND FIBER TO AVOID CONSTIPATION.    FOLLOW-UP APPOINTMENT:  A follow-up appointment should be arranged with your doctor in office as instructed; call to schedule.    You should CALL YOUR PHYSICIAN if you develop:  Fever greater than 101 degrees F.  Pain not relieved by medication, or persistent nausea or vomiting.  Excessive bleeding (blood soaking through dressing) or unexpected drainage from the wound.  Extreme redness or swelling around the incision site, drainage of pus or foul smelling drainage.  Inability to urinate or empty your bladder within 8 hours.  Problems with breathing or chest pain.    You should call 911 if you develop problems with breathing or chest pain.  If you are unable to contact your doctor or surgical center, you should go to the nearest emergency room or  urgent care center.  Physician's telephone #: [phone]    If any questions arise, call your doctor.  If your doctor is not available, please feel free to call the Surgical Center at [phone]. The Contact Center is open Monday through Friday 7AM to 5PM and may speak to a nurse at [phone], or toll free at [phone].     A registered nurse may call you a few days after your surgery to see how you are doing after your procedure.    MEDICATIONS: Resume taking daily medication.  Take prescribed pain medication with food.  If no medication is prescribed, you may take non-aspirin pain medication if needed.  PAIN MEDICATION CAN BE VERY CONSTIPATING.  Take a stool softener or laxative such as senokot, pericolace, or milk of magnesia if needed.    Prescription given for n/a .  Last pain medication given at 9:22am 10 mg oxycodone .    If your physician has prescribed pain medication that includes Acetaminophen (Tylenol), do not take additional Acetaminophen (Tylenol) while taking the prescribed medication.    Depression / Suicide Risk    As you are discharged from this [facility] facility, it is important to learn how to keep safe from harming yourself.    Recognize the warning signs:  · Abrupt changes in personality, positive or negative- including increase in energy   · Giving away possessions  · Change in eating patterns- significant weight changes-  positive or negative  · Change in sleeping patterns- unable to sleep or sleeping all the time   · Unwillingness or inability to communicate  · Depression  · Unusual sadness, discouragement and loneliness  · Talk of wanting to die  · Neglect of personal appearance   · Rebelliousness- reckless behavior  · Withdrawal from people/activities they love  · Confusion- inability to concentrate     If you or a loved one observes any of these behaviors or has concerns about self-harm, here's what you can do:  · Talk about it- your feelings and reasons for harming  yourself  · Remove any means that you might use to hurt yourself (examples: pills, rope, extension cords, firearm)  · Get professional help from the community (Mental Health, Substance Abuse, psychological counseling)  · Do not be alone:Call your Safe Contact- someone whom you trust who will be there for you.  · Call your local CRISIS HOTLINE 356-4910 or 431-551-8939  · Call your local Children's Mobile Crisis Response Team [place] (097) 969-7790 or wwwSalesVu  · Call the toll free National Suicide Prevention Hotlines   · National Suicide Prevention Lifeline 307-064-SMJP (2475)  National Hope Line Network 800-SUICIDE (163-2976)    Discharge Education for patients on ADARSH (Obstructive Sleep Apnea) Protocol      We recommend that you should be with an adult observer for at least 24 hours after your sedation/anesthesia.  If you have a CPAP machine, you should wear it during any sleep period (day or night) for the week following your procedure.  We encourage you to sleep on your side or in a sitting position, even with napping.  Lying flat on your back increases the risk of apnea and airway obstruction during your post procedure recovery period.    It is important to prevent over-sedation that could increase your risk for apnea.  Please take all pain medication as directed by your physician.  If you are not getting pain relief, please contact your physician to discuss possible approaches to relieving pain while minimizing medications that can affect your breathing and oxygen levels.      Peripheral Nerve Block Discharge Instructions from Same Day Surgery and Inpatient :    What to Expect - Upper Extremity  · You may experience numbness and weakness in shoulder, arm and hand  on the same side as your surgery  · This is normal. For some people, this may be an unpleasant sensation. Be very careful with your numb limb  · Ask for help when you need it  Shoulder Surgery Side Effects  · In addition to numbness  "and weakness you may experience other symptoms  · Other nerves that are close to those nerves injected can also be affected by local anesthesia  · You may experience a hoarseness in your voice  · Your breathing may feel different  · You may also notice drooping of your eyelid, pupil constriction, and decreased sweating, on the side of your surgery  · All of these side effects are normal and will resolve when the local anesthetic wears off   Prevent Injury  · Protect the limb like a baby  · Beware of exposing your limb to extreme heat or cold or trauma  · The limb may be injured without you noticing because it is numb  · Keep the limb elevated whenever possible  · Do not sleep on the limb  · Change the position of the limb regularly  · Avoid putting pressure on your surgical limb  Pain Control  · The initial block on the day of surgery will make your extremity feel \"numb\"  · Any consecutive injection including prior to discharge from the hospital will make your extremity feel \"numb\"  · You may feel an aching or burning when the local anesthesia starts to wear off  · Take pain pills as prescribed by your surgeon  · Call your surgeon or anesthesiologist if you do not have adequate pain control    "

## 2021-09-16 NOTE — ANESTHESIA PROCEDURE NOTES
Peripheral Block    Date/Time: 9/16/2021 6:40 AM  Performed by: Dora Marshall M.D.  Authorized by: Dora Marshall M.D.     Patient Location:  Pre-op  Start Time:  9/16/2021 6:40 AM  End Time:  9/16/2021 6:50 AM  Reason for Block: at surgeon's request and post-op pain management ONLY    patient identified, IV checked, site marked, risks and benefits discussed, surgical consent, monitors and equipment checked, pre-op evaluation and timeout performed    Patient Position:  Supine  Prep: ChloraPrep    Prep comment:  Pt okay with small patches of chloraprep  Monitoring:  Heart rate, continuous pulse ox and cardiac monitor  Block Region:  Upper Extremity  Upper Extremity - Block Type:  SUPRASCAPULAR nerve block    Laterality:  Right  Procedures: ultrasound guided  Image captured, interpreted and electronically stored.  Block Type:  Single-shot  Needle Length:  100mm  Needle Gauge:  21 G  Needle Localization:  Ultrasound guidance  Injection Assessment:  Negative aspiration for heme, no paresthesia on injection, incremental injection and local visualized surrounding nerve on ultrasound  Evidence of intravascular injection: No     Posterior Suprascapular Nerve Block  Patient seated with procedural arm grasping contralateral shoulder.  Ultrasound transducer placed oblique to the spine of the scapula over the supraspinous fossa which was identified under the trapezius and supraspinatus muscles.  Needle inserted in-plane approach from posteromedial to anterolateral under live ultrasound guidance until under the fascia of the supraspinatus muscle.  After negative aspiration, local injected with ease and seen to be spreading over the lateral floor of supraspinous fossa

## 2021-09-16 NOTE — OR NURSING
0536: Brought patient back to pre-op and assumed care.  0700: Patient allergies and NPO status verified, home medication reconciliation completed and belongings secured. Patient verbalizes understanding of pain scale, expected course of stay and plan of care. Surgical site verified with patient. IV access established. Sequentials placed on legs.

## 2021-09-16 NOTE — ANESTHESIA TIME REPORT
Anesthesia Start and Stop Event Times     Date Time Event    9/16/2021 0650 Ready for Procedure     0702 Anesthesia Start     0815 Anesthesia Stop        Responsible Staff  09/16/21    Name Role Begin End    Dora Marshall M.D. Anesth 0702 0815        Preop Diagnosis (Free Text):  Pre-op Diagnosis     Rotator cuff tear [M75.100]  SLAP tear of shoulder [S43.439A]        Preop Diagnosis (Codes):  Diagnosis Information     Diagnosis Code(s): Rotator cuff tear [M75.100]     SLAP tear of shoulder [S43.439A]     Rotator cuff impingement syndrome, unspecified laterality [M75.40]        Post op Diagnosis  Rupture of rotator cuff of shoulder      Premium Reason  Non-Premium    Comments:

## 2021-09-16 NOTE — OP REPORT
DATE OF SERVICE: 9/16/21    PREOPERATIVE DIAGNOSIS: right shoulder rotator cuff tear, type 2 acromion, SLAP tear     POSTOPERATIVE DIAGNOSIS: right shoulder rotator cuff tear, type 2 acromion, SLAP tear     PROCEDURE PERFORMED:  right shoulder arthroscopic rotator cuff repair, subacromioal decompression, with labral debridement.      SURGEON: Eliseo Arellano MD    ASSISTANT: ORVILLE Boston    ANESTHESIA: General with interscalene block for posterior pain control.     ANESTHESIOLOGIST:     ANTIBIOTICS: Ancef    COMPLICATIONS: None.     IMPLANTS: Kar Omega PEEK knotless x 2    INDICATIONS: The patient presents with right shoulder pain recalcitrant to conservative treatment. The patient has evidence of a right shoulder rotator cuff tear, type 2 acromion, labral tear, and biceps tendinitis on imaging. Options were discussed, including both non operative and operative treatments. Risks of surgery were discussed at length. All questions were answered. No guarantees were given.     PROCEDURE IN DETAIL: The patient was properly identified in the preoperative holding area, and the right shoulder was marked as the correct surgical site. The patient was then taken back to the operative room, and placed supine on the operating room table and underwent general anesthesia. The patient was placed in a beach chair position. The right upper extremity was sterilely prepped and draped in standard fashion.     A standard posterior portal was created. The scope was placed up the glenohumeral joint. An anterior portal was created through the rotator cuff interval just below the biceps. The was evidence of a SLAP tear, degenerative type. There was no evidence of biceps tendinosis. There was evidence of a full-thickness rotator cuff tear. An extensive debridement was performed including a labral debridement back to a stable smooth edge. The rest of the joint was inspected. There was mild chondromalacia both in the humeral  head and the glenoid. No loose bodies were seen.    The scope was then placed in the subacromial space. A lateral portal was created. A bursectomy was performed, exposing the anterior and lateral acromion. A 5/5 resector was used to perform a subacromial decompression removing several millimeters of bone off the anterior and lateral acromion.  At this point, the rotator cuff was mobilized. A small boy trough was created for the bony footprint. 2 horizontal tapes were used followed by 2 [employer] Omega PEEK anchors. This achieved good fixation, was probed and stable, checked in range of motion and stable. Excess fluid was drained. Incision were closed with 2-0 vicryl and 3-0 nylon. A total of 20ml 1% marcaine with epinephrine was injected.  ORVILLE Russell,  was present throughout the operation as an essential part of the success of there operation assisting with patient position, instrumentation, retraction and closure.     The patient was extubated and transferred to the recovery room in stable condition.

## 2021-09-16 NOTE — OR NURSING
1051 patient to stage 2  Patient settled in recliner chair post short ambulation from [facility] - pt dressed with assist by CNA. Pt reports pain as tolerable and denies nausea. Pt has hx of 3L NC at all times and cpap at night. Pt agrees to wear 3L NC consistently at home because she admits to be inconsistent with her oxygen.   1105 Pt denies dizziness or any problems with transfer up to chair. Sitting up with spouse at bedside.   1131 D/Srini to care of family post uneventful stay in PACU 2.

## 2021-09-16 NOTE — H&P
"Surgery Orthopedic History & Physical Note    Date  9/16/2021    Primary Care Physician  Polina Zamora M.D.      Pre-Op Diagnosis Codes:     * Rotator cuff tear [M75.100]     * SLAP tear of shoulder [S43.439A]     * Rotator cuff impingement syndrome, unspecified laterality [M75.40]    HPI  This is a 61 y.o. female who presented with right shoulder pain and weakness.    Past Medical History:   Diagnosis Date   • Anemia    • Anemia 2018   • Anginal syndrome (HCC) 2010   • Anticoagulation monitoring, special range     coumadin therapy   • Asthma    • Blood clotting disorder ([facility])     DVT left leg, right arm, PE right lung, Factor 5 Leiden   • Breath shortness    • CAD (coronary artery disease)     Old MI/POBA   • Cancer ([facility]) 1986    Uterine CX   • Cataract     Left eye   • Clotting disorder ([facility])     Factor V Leiden, IVC filter for hx of DVT, PE   • Dental disorder     Bilateral Upper X2 Implants; Bilateral Bottom X2 Implants   • Emphysema (subcutaneous) (surgical) resulting from a procedure     asthma   • Heart murmur    • High cholesterol    • Hot flashes    • Hyperlipemia, mixed    • Hypertension    • Muscle spasm    • Old MI (myocardial infarction) 2003, 2010    Treated with POBA; Cath without obstructive DZ 2009   • Ovarian cancer ([facility])    • Oxygen dependent     24hrs/day 3L   • Pacemaker    • Pain     \"Head to toe\"   • Pneumonia 2019   • Psychiatric problem     depression   • Restless leg syndrome    • Sleep apnea     CPAP   • SSS (sick sinus syndrome) ([facility])     Tx with pacer   • Uterine cancer ([facility])        Past Surgical History:   Procedure Laterality Date   • CERVICAL FUSION POSTERIOR  4/12/2017    Procedure: CERVICAL FUSION POSTERIOR- WITH INSTRUMENTATION C4-5, C5-6;  Surgeon: Alejandro Rayo M.D.;  Location: SURGERY [facility];  Service:    • PACEMAKER INSERTION  2010    bradycardia   • ANGIOPLASTY      ?2004   • CHOLECYSTECTOMY     • ELBOW EXPLORATION      cts   • HYSTERECTOMY LAPAROSCOPY     • KNEE " REPLACEMENT, TOTAL     • KNEE REVISION TOTAL     • LUMBAR EXPLORATION      buck   • NASAL RECONSTRUCTION     • OOPHORECTOMY     • OTHER SURGICAL PROCEDURE      IVC filter placement   • SHOULDER ARTHROPLASTY TOTAL REVERSED     • ZZZ CARDIAC CATH         Current Facility-Administered Medications   Medication Dose Route Frequency Provider Last Rate Last Admin   • scopolamine (TRANSDERM-SCOP) patch 1 Patch  1 Patch Transdermal Q72HRS Dora Marshall M.D.   1 Patch at 21 0644   • ipratropium-albuterol (DUONEB) nebulizer solution  3 mL Nebulization Q4H PRN (RT) Dora Marshall M.D.       • lactated ringers infusion   Intravenous Continuous Eliseo Arellano M.D. 10 mL/hr at 21 0645 New Bag at 21 0645   • ceFAZolin (ANCEF) 2 g in  mL IVPB premix  2 g Intravenous Once Eliseo Arellano M.D.           Social History     Socioeconomic History   • Marital status:      Spouse name: Not on file   • Number of children: Not on file   • Years of education: Not on file   • Highest education level: Not on file   Occupational History   • Not on file   Tobacco Use   • Smoking status: Former Smoker     Packs/day: 0.50     Years: 15.00     Pack years: 7.50     Quit date: 7/10/2001     Years since quittin.2   • Smokeless tobacco: Never Used   Vaping Use   • Vaping Use: Never used   Substance and Sexual Activity   • Alcohol use: Yes     Alcohol/week: 0.6 oz     Types: 1 Cans of beer per week     Comment: Very rarely once a yr   • Drug use: No   • Sexual activity: Not Currently     Partners: Male   Other Topics Concern   • Not on file   Social History Narrative   • Not on file     Social Determinants of Health     Financial Resource Strain:    • Difficulty of Paying Living Expenses:    Food Insecurity:    • Worried About Running Out of Food in the Last Year:    • Ran Out of Food in the Last Year:    Transportation Needs:    • Lack of Transportation (Medical):    • Lack of Transportation (Non-Medical):    Physical  Activity:    • Days of Exercise per Week:    • Minutes of Exercise per Session:    Stress:    • Feeling of Stress :    Social Connections:    • Frequency of Communication with Friends and Family:    • Frequency of Social Gatherings with Friends and Family:    • Attends Sikhism Services:    • Active Member of Clubs or Organizations:    • Attends Club or Organization Meetings:    • Marital Status:    Intimate Partner Violence:    • Fear of Current or Ex-Partner:    • Emotionally Abused:    • Physically Abused:    • Sexually Abused:        Family History   Problem Relation Age of Onset   • Cancer Mother    • Cancer Father    • Cancer Maternal Aunt    • Cancer Maternal Uncle    • Heart Disease Neg Hx        Allergies  Sulfamethoxazole-trimethoprim, Trimethoprim, Bactrim [sulfamethoxazole w-trimethoprim], Chloraprep one step, Morphine, Other misc, and Tape    Review of Systems  Negative    Physical Exam    Vital Signs  Blood Pressure: 137/91   Temperature: (!) 35.6 °C (96 °F)   Pulse: (!) 103   Respiration: 20   Pulse Oximetry: 96 %       Labs:  Recent Labs     09/13/21  1358   WBC 7.5   RBC 5.14   HEMOGLOBIN 14.7   HEMATOCRIT 47.4*   MCV 92.2   MCH 28.6   MCHC 31.0*   RDW 46.6   PLATELETCT 281   MPV 9.9     Recent Labs     09/13/21  1358   SODIUM 140   POTASSIUM 4.6   CHLORIDE 104   CO2 23   GLUCOSE 103*   BUN 7*   CREATININE 0.77   CALCIUM 9.3               Radiology:  No orders to display         Assessment/Plan:  Pre-Op Diagnosis Codes:     * Rotator cuff tear [M75.100]     * SLAP tear of shoulder [S43.439A]     * Rotator cuff impingement syndrome, unspecified laterality [M75.40]  Procedure(s):  ARTHROSCOPY, SHOULDER, WITH ROTATOR CUFF REPAIR  DECOMPRESSION, SHOULDER, ARTHROSCOPIC - SUBACROMIAL,  REPAIRS AS INDICATED.

## 2021-09-16 NOTE — ANESTHESIA POSTPROCEDURE EVALUATION
Patient: Phyllis Perez    Procedure Summary     Date: 09/16/21 Room / Location:  OR  / SURGERY [facility]    Anesthesia Start: 0702 Anesthesia Stop: 0815    Procedures:       ARTHROSCOPY, SHOULDER, WITH ROTATOR CUFF REPAIR (Right Shoulder)      DECOMPRESSION, SHOULDER, ARTHROSCOPIC - SUBACROMIAL (Right Shoulder)      ARTHROSCOPIC LABRAL DEBRIDEMENT (Right Shoulder) Diagnosis:       Rotator cuff tear      SLAP tear of shoulder      Rotator cuff impingement syndrome, unspecified laterality      (Rotator cuff tear [M75.100]  SLAP tear of shoulder [S43.439A])    Surgeons: Eliseo Arellano M.D. Responsible Provider: Dora Marshall M.D.    Anesthesia Type: general, peripheral nerve block ASA Status: 4          Final Anesthesia Type: general, peripheral nerve block  Last vitals  BP   Blood Pressure: (!) 98/69    Temp   36 °C (96.8 °F)    Pulse   85   Resp   16    SpO2   92 %      Anesthesia Post Evaluation    Patient location during evaluation: PACU  Patient participation: complete - patient participated  Level of consciousness: awake and alert  Pain score: 8  Pt with chronic pain, 7-9/10 baseline, arrived in preop today at 7/10.  Pt states that pain is tolerable and at baseline levels.  Airway patency: patent  Anesthetic complications: no  Cardiovascular status: hemodynamically stable  Respiratory status: acceptable  Hydration status: euvolemic    PONV: none          There were no known complications for this encounter.

## 2021-09-16 NOTE — LETTER
July 28, 2021    [facility]  [address] [place] [address]  [phone]    Patient Name: Phyllis Perez  Surgeon Name: Surgeon(s):  Santiago Arellano M.D.  Surgery Facility: [facility] ([address] [facility])  Surgery Date: 8/12/2021    The time of your surgery is not final and may change up to and until the day of your surgery. You will be contacted 24-48 hours prior to your surgery date with your check-in and surgery time.    If you will not be at one of the below numbers please call/text the surgery scheduler at   Cell Phone: [phone]    BEFORE YOUR SURGERY  Pre Registration and/or Lab Work must be done within and no earlier than 28 days prior to your surgery date. Please call [phone] for an appointment as soon as possible.    Please refrain from smoking any substance after midnight prior to surgery. Smoking may interfere with the anesthetic and frequently produces nausea during the recovery period.    Continue taking all lifesaving medications. Including the morning of your surgery with small sip of water.    Please read the MEDICATION INSTRUCTIONS below completely.    DAY OF YOUR SURGERY  Nothing to eat or drink after midnight     Please arrive at the hospital/surgery center at the check-in time provided.     An adult will need to bring you and take you home after your surgery.     AFTER YOUR SURGERY  Post op Appointment:   Date: 08/25/2021   Time: 11:00AM   With: SANTIAGO ARELLANO MD   Location: [address] [facility] [phone]    - Therapy- Your first appointment should be 3  day(s) after your surgery. For your convenience we have 4 Physical Therapy locations: [facility], [place], and [facility]. Call our office ASAP to schedule an appointment at [phone] or take the enclosed Therapy Prescription to a facility of your choice.    TIME OFF WORK  FMLA or Disability forms can be faxed directly to: [phone] or you may  drop them off at [address] [place] [phone]. Our office charges a $20.00 fee per form. Forms will be completed within 10 business days of drop off and payment received. For the status of your forms you may contact our disability office directly [phone].    MEDICATION INSTRUCTIONS  The following medications should be stopped a minimum of 10 days prior to surgery:  All over the counter, Supplements & Herbal medications    Anorectics: Phentermine (Adipex-P, Lomaira and Suprenza), Phentermine-topiramate (Qsymia), Bupropion-naltrexone (Contrave)    Opiod Partial Agonists/Opioid Antagonists: Buprenorphine (Subocone, Belbuca, Butrans, Probuphine Implant, Sublocade), Naltrexone (ReVia, Vivitrol), Naloxone    Amphetamines: Dextroamphetamine/Amphetamine (Adderall, Mydayis), Methylphenidate Hydrochloride (Concerta, Metadate, Methylin, Ritalin)    The following medications should be stopped 5 days prior to surgery:  Blood Thinners: Any Aspirin, Aspirin products, anti-inflammatories such as ibuprofen and any blood thinners such as Coumadin and Plavix. Please consult your prescribing physician if you are on life saving blood thinners, in regards to when to stop medications prior to surgery.     The following medications should be stopped a minimum of 3 days prior to surgery:  PDE-5 inhibitors: Sildenafil (Viagra), Tadalafil (Cialis), Vardenafil (Levitra), Avanafil (Stendra)    MAO Inhibitors: Rasagiline (Azilect), Selegiline (Eldepryl, Emsam, Selapar), Isocarboxazid (Marplan), Phenelzine (Nardil)

## 2022-02-15 ENCOUNTER — APPOINTMENT (OUTPATIENT)
Dept: SLEEP MEDICINE | Facility: MEDICAL CENTER | Age: 62
End: 2022-02-15

## 2025-02-09 NOTE — TELEPHONE ENCOUNTER
----- Message from Loreta Orozco sent at 3/23/2017 10:32 AM PDT -----  Regarding: Surgical clearance sent for wrong procedure  /Dora Grace with [facility] at [phone] called. She received the patient's clearance, but it's for the wrong procedure.  
New letter created and faxed to [facility].  
Negative

## (undated) DEVICE — PIN HEAD MAYFIELD DISP. (3EA/PK 12PK/BX)

## (undated) DEVICE — SUTURE GENERAL

## (undated) DEVICE — PROTECTOR ULNA NERVE - (36PR/CA)

## (undated) DEVICE — KIT ROOM DECONTAMINATION

## (undated) DEVICE — LACTATED RINGERS INJ 1000 ML - (14EA/CA 60CA/PF)

## (undated) DEVICE — TOOL DISSECT MATCH HEAD

## (undated) DEVICE — KIT EVACUATER 3 SPRING PVC LF 1/8 DRAIN SIZE (10EA/CA)"

## (undated) DEVICE — DRAPETIBURON SHOULDER W/POUCH - (5EA/CA)

## (undated) DEVICE — ELECTRODE 850 FOAM ADHESIVE - HYDROGEL RADIOTRNSPRNT (50/PK)

## (undated) DEVICE — GLOVE BIOGEL PI ORTHO SZ 8 PF LF (40PR/BX)

## (undated) DEVICE — GLOVE BIOGEL SZ 8 SURGICAL PF LTX - (50PR/BX 4BX/CA)

## (undated) DEVICE — GOWN WARMING STANDARD FLEX - (30/CA)

## (undated) DEVICE — SODIUM CHL. IRRIGATION 0.9% 3000ML (4EA/CA 65CA/PF)

## (undated) DEVICE — CHLORAPREP 26 ML APPLICATOR - ORANGE TINT(25/CA)

## (undated) DEVICE — PACK SHOULDER ARTHROSCOPY SM - (2EA/CA)

## (undated) DEVICE — INTRAOP NEURO IN OR 1:1 PER 15 MIN

## (undated) DEVICE — SLEEVE, VASO, THIGH, MED

## (undated) DEVICE — CANNULA TWIST IN 8.25MM X 7CM (5/BX)

## (undated) DEVICE — CANISTER SUCTION RIGID RED 1500CC (40EA/CA)

## (undated) DEVICE — PACK NEURO - (2EA/CA)

## (undated) DEVICE — SUTURE 1 VICRYL PLUS CTX - 8 X 18 INCH (12/BX)

## (undated) DEVICE — SPONGE GAUZESTER 4 X 4 4PLY - (128PK/CA)

## (undated) DEVICE — SUTURE ULTRATAPE BLUE 2MM (6EA/BX)

## (undated) DEVICE — GLOVE BIOGEL INDICATOR SZ 7SURGICAL PF LTX - (50/BX 4BX/CA)

## (undated) DEVICE — CANISTER SUCTION 3000ML MECHANICAL FILTER AUTO SHUTOFF MEDI-VAC NONSTERILE LF DISP  (40EA/CA)

## (undated) DEVICE — SYRINGE SAFETY 10 ML 18 GA X 1 1/2 BLUNT LL (100/BX 4BX/CA)

## (undated) DEVICE — LEAD SET 6 DISP. EKG NIHON KOHDEN

## (undated) DEVICE — SPIDER SHOULDER HOLDER (12EA/BX)

## (undated) DEVICE — SUCTION INSTRUMENT YANKAUER BULBOUS TIP W/O VENT (50EA/CA)

## (undated) DEVICE — SUTURE ULTRATAPE COBRAID BLUE 2MM (6EA/BX)

## (undated) DEVICE — ELECTRODE DUAL RETURN W/ CORD - (50/PK)

## (undated) DEVICE — CANNULA KIT UNIV GREY - (10/BX)

## (undated) DEVICE — ARTHROWAND TURBOVAC 3.5/90 SCT

## (undated) DEVICE — SUTURE PASSER SELF CAPTURE

## (undated) DEVICE — SHAVER4.0 AGGRESSIVE + FORMLA (5EA/BX)

## (undated) DEVICE — HEAD HOLDER JUNIOR/ADULT

## (undated) DEVICE — DRAPE LARGE 3 QUARTER - (20/CA)

## (undated) DEVICE — NEPTUNE 4 PORT MANIFOLD - (20/PK)

## (undated) DEVICE — DRAPE MICROSCOPE X-LONG (10EA/CA)

## (undated) DEVICE — SODIUM CHL IRRIGATION 0.9% 1000ML (12EA/CA)

## (undated) DEVICE — SUCTION TIP STRAIGHT ARGYLE - 50EA/CA

## (undated) DEVICE — NEEDLE W/FACET TIP DULL VERSION W/STIMULATION CABLE SONOPLEX 22G X 2 (10EA/CA)"

## (undated) DEVICE — TUBING C&T SET FLYING LEADS DRAIN TUBING (10EA/BX)

## (undated) DEVICE — GOWN SURGEONS X-LARGE - DISP. (30/CA)

## (undated) DEVICE — GLOVE BIOGEL PI ULTRATOUCH SZ 7.5 SURGICAL PF LF -(50/BX 4BX/CA)

## (undated) DEVICE — HUMID-VENT HEAT AND MOISTURE EXCHANGE- (50/BX)

## (undated) DEVICE — GLOVE, LITE (PAIR)

## (undated) DEVICE — SUTURE 2-0 VICRYL PLUS CT-1 - 8 X 18 INCH(12/BX)

## (undated) DEVICE — TUBE CONNECTING SUCTION - CLEAR PLASTIC STERILE 72 IN (50EA/CA)

## (undated) DEVICE — MIDAS LUBRICATOR DIFFUSER PACK (4EA/CA)

## (undated) DEVICE — LACTATED RINGERS INJ. 500 ML - (24EA/CA)

## (undated) DEVICE — MASK ANESTHESIA ADULT  - (100/CA)

## (undated) DEVICE — SUTURE 3-0 ETHILON FS-1 - (36/BX) 30 INCH

## (undated) DEVICE — DRAPE STRLE REG TOWEL 18X24 - (10/BX 4BX/CA)"

## (undated) DEVICE — TOWEL STOP TIMEOUT SAFETY FLAG (40EA/CA)

## (undated) DEVICE — GLOVE BIOGEL INDICATOR SZ 8.5 SURGICAL PF LTX - (50/BX 4BX/CA)

## (undated) DEVICE — KIT ANESTHESIA W/CIRCUIT & 3/LT BAG W/FILTER (20EA/CA)

## (undated) DEVICE — SHAVER, 5.5 RESECTOR

## (undated) DEVICE — TUBING PATIENT W/CONNECTOR REDEUCE (1EA)

## (undated) DEVICE — TUBING CLEARLINK DUO-VENT - C-FLO (48EA/CA)

## (undated) DEVICE — GLOVE BIOGEL SZ 6.5 SURGICAL PF LTX (50PR/BX 4BX/CA)

## (undated) DEVICE — BAG SPONGE COUNT 10.25 X 32 - BLUE (250/CA)

## (undated) DEVICE — SENSOR SPO2 NEO LNCS ADHESIVE (20/BX) SEE USER NOTES

## (undated) DEVICE — SET LEADWIRE 5 LEAD BEDSIDE DISPOSABLE ECG (1SET OF 5/EA)

## (undated) DEVICE — SHEET PEDIATRIC LAPAROTOMY - (10/CA)

## (undated) DEVICE — GLOVE BIOGEL PI INDICATOR SZ 6.5 SURGICAL PF LF - (50/BX 4BX/CA)

## (undated) DEVICE — ARMREST CRADLE FOAM - (2PR/PK 12PR/CA)

## (undated) DEVICE — SET EXTENSION WITH 2 PORTS (48EA/CA) ***PART #2C8610 IS A SUBSTITUTE*****

## (undated) DEVICE — WATER IRRIGATION STERILE 1000ML (12EA/CA)

## (undated) DEVICE — KIT SURGIFLO W/OUT THROMBIN - (6EA/CA)

## (undated) DEVICE — HEADREST PRONEVIEW LARGE - (10/CA)

## (undated) DEVICE — TUBING PUMP WITH CONNECTOR REDEUCE (1EA)